# Patient Record
Sex: MALE | Race: WHITE | NOT HISPANIC OR LATINO | URBAN - METROPOLITAN AREA
[De-identification: names, ages, dates, MRNs, and addresses within clinical notes are randomized per-mention and may not be internally consistent; named-entity substitution may affect disease eponyms.]

---

## 2018-07-21 ENCOUNTER — INPATIENT (INPATIENT)
Facility: HOSPITAL | Age: 43
LOS: 5 days | Discharge: HOME | End: 2018-07-27
Attending: INTERNAL MEDICINE | Admitting: INTERNAL MEDICINE
Payer: COMMERCIAL

## 2018-07-21 VITALS
OXYGEN SATURATION: 97 % | TEMPERATURE: 98 F | SYSTOLIC BLOOD PRESSURE: 120 MMHG | RESPIRATION RATE: 18 BRPM | HEART RATE: 78 BPM | DIASTOLIC BLOOD PRESSURE: 87 MMHG

## 2018-07-21 DIAGNOSIS — Z95.2 PRESENCE OF PROSTHETIC HEART VALVE: Chronic | ICD-10-CM

## 2018-07-21 DIAGNOSIS — Z98.890 OTHER SPECIFIED POSTPROCEDURAL STATES: Chronic | ICD-10-CM

## 2018-07-21 LAB
ALBUMIN SERPL ELPH-MCNC: 3.9 G/DL — SIGNIFICANT CHANGE UP (ref 3.5–5.2)
ALP SERPL-CCNC: 70 U/L — SIGNIFICANT CHANGE UP (ref 30–115)
ALT FLD-CCNC: 55 U/L — HIGH (ref 0–41)
ANION GAP SERPL CALC-SCNC: 11 MMOL/L — SIGNIFICANT CHANGE UP (ref 7–14)
APTT BLD: 36.7 SEC — SIGNIFICANT CHANGE UP (ref 27–39.2)
AST SERPL-CCNC: 55 U/L — HIGH (ref 0–41)
BASE EXCESS BLDV CALC-SCNC: 1.5 MMOL/L — SIGNIFICANT CHANGE UP (ref -2–2)
BASOPHILS # BLD AUTO: 0.08 K/UL — SIGNIFICANT CHANGE UP (ref 0–0.2)
BASOPHILS NFR BLD AUTO: 0.7 % — SIGNIFICANT CHANGE UP (ref 0–1)
BILIRUB SERPL-MCNC: 1.7 MG/DL — HIGH (ref 0.2–1.2)
BUN SERPL-MCNC: 26 MG/DL — HIGH (ref 10–20)
CALCIUM SERPL-MCNC: 8.7 MG/DL — SIGNIFICANT CHANGE UP (ref 8.5–10.1)
CHLORIDE SERPL-SCNC: 104 MMOL/L — SIGNIFICANT CHANGE UP (ref 98–110)
CK MB CFR SERPL CALC: 7.1 NG/ML — HIGH (ref 0.6–6.3)
CK MB CFR SERPL CALC: 7.6 NG/ML — HIGH (ref 0.6–6.3)
CK SERPL-CCNC: 529 U/L — HIGH (ref 0–225)
CK SERPL-CCNC: 596 U/L — HIGH (ref 0–225)
CO2 SERPL-SCNC: 27 MMOL/L — SIGNIFICANT CHANGE UP (ref 17–32)
CREAT SERPL-MCNC: 1.6 MG/DL — HIGH (ref 0.7–1.5)
D DIMER BLD IA.RAPID-MCNC: 213 NG/ML DDU — SIGNIFICANT CHANGE UP (ref 0–230)
EOSINOPHIL # BLD AUTO: 0.24 K/UL — SIGNIFICANT CHANGE UP (ref 0–0.7)
EOSINOPHIL NFR BLD AUTO: 2.2 % — SIGNIFICANT CHANGE UP (ref 0–8)
GLUCOSE SERPL-MCNC: 100 MG/DL — HIGH (ref 70–99)
HCO3 BLDV-SCNC: 30 MMOL/L — HIGH (ref 22–29)
HCT VFR BLD CALC: 46.5 % — SIGNIFICANT CHANGE UP (ref 42–52)
HGB BLD-MCNC: 14.8 G/DL — SIGNIFICANT CHANGE UP (ref 14–18)
HOROWITZ INDEX BLDV+IHG-RTO: 21 — SIGNIFICANT CHANGE UP
IMM GRANULOCYTES NFR BLD AUTO: 0.4 % — HIGH (ref 0.1–0.3)
INR BLD: 3.25 RATIO — HIGH (ref 0.65–1.3)
LACTATE BLDV-MCNC: 1.6 MMOL/L — SIGNIFICANT CHANGE UP (ref 0.5–1.6)
LYMPHOCYTES # BLD AUTO: 2.46 K/UL — SIGNIFICANT CHANGE UP (ref 1.2–3.4)
LYMPHOCYTES # BLD AUTO: 22.2 % — SIGNIFICANT CHANGE UP (ref 20.5–51.1)
MCHC RBC-ENTMCNC: 29 PG — SIGNIFICANT CHANGE UP (ref 27–31)
MCHC RBC-ENTMCNC: 31.8 G/DL — LOW (ref 32–37)
MCV RBC AUTO: 91 FL — SIGNIFICANT CHANGE UP (ref 80–94)
MONOCYTES # BLD AUTO: 0.74 K/UL — HIGH (ref 0.1–0.6)
MONOCYTES NFR BLD AUTO: 6.7 % — SIGNIFICANT CHANGE UP (ref 1.7–9.3)
NEUTROPHILS # BLD AUTO: 7.51 K/UL — HIGH (ref 1.4–6.5)
NEUTROPHILS NFR BLD AUTO: 67.8 % — SIGNIFICANT CHANGE UP (ref 42.2–75.2)
NRBC # BLD: 0 /100 WBCS — SIGNIFICANT CHANGE UP (ref 0–0)
NT-PROBNP SERPL-SCNC: 4508 PG/ML — HIGH (ref 0–300)
PCO2 BLDV: 59 MMHG — HIGH (ref 41–51)
PH BLDV: 7.31 — SIGNIFICANT CHANGE UP (ref 7.26–7.43)
PLATELET # BLD AUTO: 224 K/UL — SIGNIFICANT CHANGE UP (ref 130–400)
PO2 BLDV: 20 MMHG — SIGNIFICANT CHANGE UP (ref 20–40)
POTASSIUM SERPL-MCNC: 4.4 MMOL/L — SIGNIFICANT CHANGE UP (ref 3.5–5)
POTASSIUM SERPL-SCNC: 4.4 MMOL/L — SIGNIFICANT CHANGE UP (ref 3.5–5)
PROT SERPL-MCNC: 6.4 G/DL — SIGNIFICANT CHANGE UP (ref 6–8)
PROTHROM AB SERPL-ACNC: 36 SEC — HIGH (ref 9.95–12.87)
RBC # BLD: 5.11 M/UL — SIGNIFICANT CHANGE UP (ref 4.7–6.1)
RBC # FLD: 14.8 % — HIGH (ref 11.5–14.5)
SAO2 % BLDV: 21 % — SIGNIFICANT CHANGE UP
SODIUM SERPL-SCNC: 142 MMOL/L — SIGNIFICANT CHANGE UP (ref 135–146)
TROPONIN T SERPL-MCNC: 0.04 NG/ML — CRITICAL HIGH
TROPONIN T SERPL-MCNC: 0.05 NG/ML — CRITICAL HIGH
WBC # BLD: 11.07 K/UL — HIGH (ref 4.8–10.8)
WBC # FLD AUTO: 11.07 K/UL — HIGH (ref 4.8–10.8)

## 2018-07-21 RX ORDER — INFLUENZA VIRUS VACCINE 15; 15; 15; 15 UG/.5ML; UG/.5ML; UG/.5ML; UG/.5ML
0.5 SUSPENSION INTRAMUSCULAR ONCE
Qty: 0 | Refills: 0 | Status: COMPLETED | OUTPATIENT
Start: 2018-07-21 | End: 2018-07-21

## 2018-07-21 RX ORDER — WARFARIN SODIUM 2.5 MG/1
2.5 TABLET ORAL ONCE
Qty: 0 | Refills: 0 | Status: COMPLETED | OUTPATIENT
Start: 2018-07-21 | End: 2018-07-21

## 2018-07-21 RX ORDER — ATORVASTATIN CALCIUM 80 MG/1
40 TABLET, FILM COATED ORAL AT BEDTIME
Qty: 0 | Refills: 0 | Status: DISCONTINUED | OUTPATIENT
Start: 2018-07-21 | End: 2018-07-27

## 2018-07-21 RX ORDER — ASPIRIN/CALCIUM CARB/MAGNESIUM 324 MG
81 TABLET ORAL DAILY
Qty: 0 | Refills: 0 | Status: DISCONTINUED | OUTPATIENT
Start: 2018-07-21 | End: 2018-07-27

## 2018-07-21 RX ORDER — METOPROLOL TARTRATE 50 MG
25 TABLET ORAL
Qty: 0 | Refills: 0 | Status: DISCONTINUED | OUTPATIENT
Start: 2018-07-21 | End: 2018-07-26

## 2018-07-21 RX ORDER — FUROSEMIDE 40 MG
20 TABLET ORAL ONCE
Qty: 0 | Refills: 0 | Status: COMPLETED | OUTPATIENT
Start: 2018-07-21 | End: 2018-07-21

## 2018-07-21 RX ORDER — FUROSEMIDE 40 MG
40 TABLET ORAL
Qty: 0 | Refills: 0 | Status: DISCONTINUED | OUTPATIENT
Start: 2018-07-22 | End: 2018-07-23

## 2018-07-21 RX ORDER — IPRATROPIUM/ALBUTEROL SULFATE 18-103MCG
3 AEROSOL WITH ADAPTER (GRAM) INHALATION ONCE
Qty: 0 | Refills: 0 | Status: COMPLETED | OUTPATIENT
Start: 2018-07-21 | End: 2018-07-21

## 2018-07-21 RX ADMIN — WARFARIN SODIUM 2.5 MILLIGRAM(S): 2.5 TABLET ORAL at 23:22

## 2018-07-21 RX ADMIN — Medication 3 MILLILITER(S): at 18:20

## 2018-07-21 RX ADMIN — Medication 25 MILLIGRAM(S): at 23:02

## 2018-07-21 RX ADMIN — Medication 20 MILLIGRAM(S): at 19:16

## 2018-07-21 RX ADMIN — ATORVASTATIN CALCIUM 40 MILLIGRAM(S): 80 TABLET, FILM COATED ORAL at 23:02

## 2018-07-21 RX ADMIN — Medication 20 MILLIGRAM(S): at 23:01

## 2018-07-21 NOTE — ED PROVIDER NOTE - PHYSICAL EXAMINATION
VITAL SIGNS: I have reviewed nursing notes and confirm.  CONSTITUTIONAL: Well-developed; well-nourished; in no acute distress.  SKIN: Skin exam is warm and dry, <2 sec cap refill  HEAD: Normocephalic; atraumatic.  EYES: PERRL, EOM intact;normal conjunctiva.  ENT: MMM; airway clear.   NECK: Supple; non tender.  CARD: RRR, 2+ dp pulses  RESP: No wheezes, rales or rhonchi, speaking in full sentences  ABD: soft non tender.   NEURO: Alert, oriented. Grossly unremarkable. No focal deficits.  PSYCH: Cooperative, appropriate. VITAL SIGNS: I have reviewed nursing notes and confirm.  CONSTITUTIONAL: Well-developed; well-nourished; in no acute distress.  SKIN: Skin exam is warm and dry, <2 sec cap refill  HEAD: Normocephalic; atraumatic.  EYES: PERRL, EOM intact;normal conjunctiva.  ENT: MMM; airway clear.   NECK: Supple; non tender.  CARD: tachycardic, 2+ dp pulses, no lower extremity edema  RESP: No wheezes, rales or rhonchi, speaking in full sentences  ABD: soft non tender.   NEURO: Alert, oriented. Grossly unremarkable. No focal deficits.  PSYCH: Cooperative, appropriate. VITAL SIGNS: I have reviewed nursing notes and confirm.  CONSTITUTIONAL: Well-developed; well-nourished; in no acute distress.  SKIN: Skin exam is warm and dry, <2 sec cap refill  HEAD: Normocephalic; atraumatic.  EYES: PERRL, EOM intact;normal conjunctiva.  ENT: MMM; airway clear.   NECK: Supple; non tender.  CARD: tachycardic, 2+ dp pulses, no lower extremity edema  RESP: bibasliar crackles, speaking in full sentences  ABD: soft non tender.   NEURO: Alert, oriented. Grossly unremarkable. No focal deficits.  PSYCH: Cooperative, appropriate.

## 2018-07-21 NOTE — ED PROVIDER NOTE - CARE PLAN
Principal Discharge DX:	Congestive heart failure Principal Discharge DX:	Congestive heart failure  Secondary Diagnosis:	Elevated troponin

## 2018-07-21 NOTE — ED PROVIDER NOTE - PROGRESS NOTE DETAILS
Pt signed out to Dr. Claros pending work up and admission. Work-up reviewed. Will admit to tele. Discussed with Dr. Stevens. I was directly involved in the care of this patient. PA Fellow Shiloh note/plan reviewed and agreed.

## 2018-07-21 NOTE — ED PROVIDER NOTE - MEDICAL DECISION MAKING DETAILS
I have personally performed a history and physical exam on this patient and personally directed the management of the patient.  Chart reviewed. Will admit to tele. I have personally performed a history and physical exam on this patient and personally directed the management of the patient.  Chart reviewed. Will admit to tele for CHF.

## 2018-07-21 NOTE — CONSULT NOTE ADULT - SUBJECTIVE AND OBJECTIVE BOX
Patient is a 42y old  Male who presents with a chief complaint of SOB    HPI: 42 years old M, with Pmhx Aortic/ Mitral valves replacement ( metallic) on coumadin due to endocarditis due to tooth infection, denies IVDA, presented to ED for 1 week hx of SOB, cough, orthopnea, symptoms worsened ED. last echo per him prior to surgery, followed by cardio in [place]. also reports intermittent retrosternal CP with no radiation, no fever, chills, mild leg swelling. in ED pro BNP was high, was given IV lasix and called to evaluate, reports symptoms of KARL but was never tested      PAST MEDICAL & SURGICAL HISTORY:  MVR/AVR/ METALLIC  HERNIA SURGERY    SOCIAL HX:   Smoking denies        REVIEW OF SYSTEMS see hpi      Allergies    penicillin (Unknown)    Intolerances        : Home Meds:      PHYSICAL EXAM    Vital Signs   T(C): 37.1 (21 Jul 2018 19:06), Max: 37.2 (21 Jul 2018 18:32)  T(F): 98.8 (21 Jul 2018 19:06), Max: 98.9 (21 Jul 2018 18:32)  HR: 109 (21 Jul 2018 19:34) (78 - 115)  BP: 120/83 (21 Jul 2018 19:06) (120/83 - 120/87)-  RR: 21 (21 Jul 2018 19:06) (18 - 21)  SpO2: 96% (21 Jul 2018 19:06) (96% - 97%) RA      General:  HEENT:  JUAN              Lymph Nodes: No cervical LN   Lungs: Bibasilar crackles  Cardiovascular: Regular  Abdomen: Soft, Positive BS  Extremities: swelling +  Skin: Warm  Neurological: Non focal         LABS:                          14.8   11.07 )-----------( 224      ( 21 Jul 2018 18:10 )             46.5                                               07-21    142  |  104  |  26<H>  ----------------------------<  100<H>  4.4   |  27  |  1.6<H>    Ca    8.7      21 Jul 2018 18:10    TPro  6.4  /  Alb  3.9  /  TBili  1.7<H>  /  DBili  x   /  AST  55<H>  /  ALT  55<H>  /  AlkPhos  70  07-21      PT/INR - ( 21 Jul 2018 18:10 )   PT: 36.00 sec;   INR: 3.25 ratio         PTT - ( 21 Jul 2018 18:10 )  PTT:36.7 sec                                           CARDIAC MARKERS ( 21 Jul 2018 18:10 )  x     / 0.04 ng/mL / 596 U/L / x     / 7.6 ng/mL                                            LIVER FUNCTIONS - ( 21 Jul 2018 18:10 )  Alb: 3.9 g/dL / Pro: 6.4 g/dL / ALK PHOS: 70 U/L / ALT: 55 U/L / AST: 55 U/L / GGT: x                                                                                                                                       X-Rays  reviewed/ EKG reviewed

## 2018-07-21 NOTE — ED PROVIDER NOTE - ATTENDING CONTRIBUTION TO CARE
42 y.o. male, PMH of open heart sx 1 year ago, MVR, on Coumadin, comes in c/o 1 wk h/o SOB and chest discomfort. No fever/chills, n/v/c/d, abdominal pain, leg pain/swelling. Reports mild cough. On exam, pt in NAD, AAOx3, head NC/AT, lungs CTA B/L, CV S1S2 regular, abdomen soft/NT/ND/(+)BS, ext (-) edema. Will do labs, CXR, neb and reevaluate.

## 2018-07-21 NOTE — ED PROVIDER NOTE - NS ED ROS FT
Review of Systems:  CONSTITUTIONAL - no fever  EYES - no photophobia, no blurred vision  ENT - no ear pain, no nasal discharge, no sore throat  RESPIRATORY -+shortness of breath,+dry cough,  CARDIAC - +CP  GI - no abd pain, no nausea, no vomiting, no diarrhea,   GENITO-URINARY - no dysuria; no hematuria,   MUSCULOSKELETAL - no joint paint  NEUROLOGIC - no headache,   PSYCH - no anxiety, non suicidal, non homicidal, no hallucination, no depression

## 2018-07-21 NOTE — ED PROVIDER NOTE - OBJECTIVE STATEMENT
43 y/o M, h/o AV/MV replacement (1 year ago), 43 y/o M, h/o AV/MV replacement s/p endocarditits (1 year ago at [facility] in [place]), presents with SOB worsening for the past month and sharp midsternal CP that started today. pt states he has been feeling increasingly SOB and RATLIFF the past 2 weeks and has developed a dry cough. pt also reports difficulty lying flat to sleep. No recent travel. no personal/family history of blood clots/cardiac problems. no prior history of similar symptoms in the past. Denies fever, chills, abd pain, flank pain, n/v, leg swelling. 43 y/o M, h/o AV/MV replacement-on coumadin s/p endocarditits (1 year ago at [facility] in [place]), presents with SOB worsening for the past month and sharp midsternal CP that started today. pt states he has been feeling increasingly SOB and RATLIFF the past 2 weeks and has developed a dry cough. pt also reports difficulty lying flat to sleep. No recent travel. no personal/family history of blood clots/cardiac problems. no prior history of similar symptoms in the past. Denies fever, chills, abd pain, flank pain, n/v, leg swelling. 41 y/o M, h/o AV/MV replacement-on coumadin (1 year ago at [facility] in [place]), presents with SOB worsening for the past month and sharp midsternal CP that started today. pt states he has been feeling increasingly SOB and RATLIFF the past 2 weeks and has developed a dry cough. pt also reports difficulty lying flat to sleep. No recent travel. no personal/family history of blood clots/cardiac problems. no prior history of similar symptoms in the past. Denies fever, chills, wheezing, palpitations, diaphoresis, abd pain, flank pain, n/v, leg swelling. 43 y/o M, h/o AV/MV replacement-on coumadin (1 year ago at [facility] in [place] from endocarditis resulting from dental procedure), presents with SOB worsening for the past month and sharp midsternal CP that started today. pt states he has been feeling increasingly SOB and RATLIFF the past 2 weeks and has developed a dry cough. pt also reports difficulty lying flat to sleep. No recent travel. no personal/family history of blood clots/cardiac problems. no prior history of similar symptoms in the past. Denies fever, chills, wheezing, palpitations, diaphoresis, abd pain, flank pain, n/v, leg swelling.

## 2018-07-21 NOTE — CONSULT NOTE ADULT - ASSESSMENT
SOB/ ORTHOPNEA/ INCREASE MARKINGS ON CXR/ FAVOR CONGESTIVE HEART FAILURE/ VALVULAR DISEASE/ CP    - ADMIT TO TELE  - LASIX 40 MG IV 12 H   - ASA/ LIPITOR/ KEEP METOPROLOL (VALVE RELATED)  - CE X2 Q 8H  - 2 D ECHO  - CARDIO EVAL  - COUMADIN F/UP INR  - REPEAT CXR IN AM  - NEEDS NPSG OP/ CAN DO TRENDING POX  - DVT/ GI PROPHYLAXIS

## 2018-07-22 LAB
ANION GAP SERPL CALC-SCNC: 12 MMOL/L — SIGNIFICANT CHANGE UP (ref 7–14)
ANION GAP SERPL CALC-SCNC: 13 MMOL/L — SIGNIFICANT CHANGE UP (ref 7–14)
BUN SERPL-MCNC: 25 MG/DL — HIGH (ref 10–20)
BUN SERPL-MCNC: 26 MG/DL — HIGH (ref 10–20)
CALCIUM SERPL-MCNC: 8.8 MG/DL — SIGNIFICANT CHANGE UP (ref 8.5–10.1)
CALCIUM SERPL-MCNC: 8.8 MG/DL — SIGNIFICANT CHANGE UP (ref 8.5–10.1)
CHLORIDE SERPL-SCNC: 100 MMOL/L — SIGNIFICANT CHANGE UP (ref 98–110)
CHLORIDE SERPL-SCNC: 101 MMOL/L — SIGNIFICANT CHANGE UP (ref 98–110)
CK MB CFR SERPL CALC: 6 NG/ML — SIGNIFICANT CHANGE UP (ref 0.6–6.3)
CK SERPL-CCNC: 369 U/L — HIGH (ref 0–225)
CO2 SERPL-SCNC: 29 MMOL/L — SIGNIFICANT CHANGE UP (ref 17–32)
CO2 SERPL-SCNC: 30 MMOL/L — SIGNIFICANT CHANGE UP (ref 17–32)
CREAT SERPL-MCNC: 1.4 MG/DL — SIGNIFICANT CHANGE UP (ref 0.7–1.5)
CREAT SERPL-MCNC: 1.5 MG/DL — SIGNIFICANT CHANGE UP (ref 0.7–1.5)
GLUCOSE SERPL-MCNC: 142 MG/DL — HIGH (ref 70–99)
GLUCOSE SERPL-MCNC: 98 MG/DL — SIGNIFICANT CHANGE UP (ref 70–99)
HCT VFR BLD CALC: 47.3 % — SIGNIFICANT CHANGE UP (ref 42–52)
HGB BLD-MCNC: 14.9 G/DL — SIGNIFICANT CHANGE UP (ref 14–18)
INR BLD: 3.32 RATIO — HIGH (ref 0.65–1.3)
MCHC RBC-ENTMCNC: 28.5 PG — SIGNIFICANT CHANGE UP (ref 27–31)
MCHC RBC-ENTMCNC: 31.5 G/DL — LOW (ref 32–37)
MCV RBC AUTO: 90.6 FL — SIGNIFICANT CHANGE UP (ref 80–94)
NRBC # BLD: 0 /100 WBCS — SIGNIFICANT CHANGE UP (ref 0–0)
PLATELET # BLD AUTO: 199 K/UL — SIGNIFICANT CHANGE UP (ref 130–400)
POTASSIUM SERPL-MCNC: 4 MMOL/L — SIGNIFICANT CHANGE UP (ref 3.5–5)
POTASSIUM SERPL-MCNC: 5.2 MMOL/L — HIGH (ref 3.5–5)
POTASSIUM SERPL-SCNC: 4 MMOL/L — SIGNIFICANT CHANGE UP (ref 3.5–5)
POTASSIUM SERPL-SCNC: 5.2 MMOL/L — HIGH (ref 3.5–5)
PROTHROM AB SERPL-ACNC: 36.7 SEC — HIGH (ref 9.95–12.87)
RBC # BLD: 5.22 M/UL — SIGNIFICANT CHANGE UP (ref 4.7–6.1)
RBC # FLD: 14.9 % — HIGH (ref 11.5–14.5)
SODIUM SERPL-SCNC: 142 MMOL/L — SIGNIFICANT CHANGE UP (ref 135–146)
SODIUM SERPL-SCNC: 143 MMOL/L — SIGNIFICANT CHANGE UP (ref 135–146)
TROPONIN T SERPL-MCNC: 0.04 NG/ML — CRITICAL HIGH
WBC # BLD: 10.18 K/UL — SIGNIFICANT CHANGE UP (ref 4.8–10.8)
WBC # FLD AUTO: 10.18 K/UL — SIGNIFICANT CHANGE UP (ref 4.8–10.8)

## 2018-07-22 PROCEDURE — 93970 EXTREMITY STUDY: CPT | Mod: 26

## 2018-07-22 RX ORDER — WARFARIN SODIUM 2.5 MG/1
5 TABLET ORAL DAILY
Qty: 0 | Refills: 0 | Status: DISCONTINUED | OUTPATIENT
Start: 2018-07-22 | End: 2018-07-22

## 2018-07-22 RX ORDER — WARFARIN SODIUM 2.5 MG/1
2.5 TABLET ORAL ONCE
Qty: 0 | Refills: 0 | Status: COMPLETED | OUTPATIENT
Start: 2018-07-22 | End: 2018-07-22

## 2018-07-22 RX ORDER — METOPROLOL TARTRATE 50 MG
25 TABLET ORAL DAILY
Qty: 0 | Refills: 0 | Status: DISCONTINUED | OUTPATIENT
Start: 2018-07-22 | End: 2018-07-22

## 2018-07-22 RX ADMIN — Medication 81 MILLIGRAM(S): at 12:48

## 2018-07-22 RX ADMIN — Medication 40 MILLIGRAM(S): at 18:30

## 2018-07-22 RX ADMIN — Medication 40 MILLIGRAM(S): at 05:35

## 2018-07-22 RX ADMIN — ATORVASTATIN CALCIUM 40 MILLIGRAM(S): 80 TABLET, FILM COATED ORAL at 21:19

## 2018-07-22 RX ADMIN — Medication 25 MILLIGRAM(S): at 10:26

## 2018-07-22 RX ADMIN — Medication 25 MILLIGRAM(S): at 21:20

## 2018-07-22 RX ADMIN — WARFARIN SODIUM 2.5 MILLIGRAM(S): 2.5 TABLET ORAL at 21:19

## 2018-07-22 NOTE — H&P ADULT - ASSESSMENT
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     RATLIFF/orthopnea/chest tightness 2/2 CHF exacerbation  -Admit to unit  -c/w Lasix 40 q12h  -cxr still shows CHF  -BNP is 4508  -CE 0.04-->0.05-->0.04  -2 d echo ordered   -Cardiology following  -c/w metoprolol  -check lipids, tsh and HbA1c  -f/u venous duplex    Mechanical mitral and aortic valve  -c/w coumadin  -keep INR between 2.5-3.5    CKD baseline unavailable  -monitor bmp as could be 2/2 edema    DVT ppx  -c/w coumadin    GI ppx  -start protonix

## 2018-07-22 NOTE — CONSULT NOTE ADULT - ASSESSMENT
Patient now not sob. Lying flat in bed not on O2. No overt CHF now. Will begin beta to slow HR. He needs a echo . Check repeat cxr

## 2018-07-22 NOTE — H&P ADULT - NSHPSOCIALHISTORY_GEN_ALL_CORE
Marital Status:  (   )    (   ) Single    (x )    (  )   Occupation:   Lives with: ( x ) alone  (  ) children   (  ) spouse   (  ) parents  (  ) other    Substance Use (street drugs): ( x ) never used  (  ) other:  Tobacco Usage:  (   ) never smoked   (Quit 1 yr ago and used to smoke 1ppd for 23yrs) former smoker   (   ) current smoker  (     ) pack year  (        ) last cigarette date  Alcohol Usage: occasional

## 2018-07-22 NOTE — H&P ADULT - HISTORY OF PRESENT ILLNESS
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 As per patient the symptoms continued to worsen which is why he came to the ED. He was unable to lie flat and had mild lower extremity edema. He denies palpitations, fevers or chills. In the ED patient was found to have elevated bnp and was given lasix after his cxr showed CHF.

## 2018-07-22 NOTE — H&P ADULT - PMH
Aortic valve replaced  metallic valve  Infective endocarditis, due to unspecified organism, unspecified chronicity    Mitral valve replaced  metallic valve

## 2018-07-22 NOTE — CONSULT NOTE ADULT - SUBJECTIVE AND OBJECTIVE BOX
CARDIOLOGY CONSULT NOTE     CHIEF COMPLAINT/REASON FOR CONSULT:    HPI:      42 years old M, with Pmhx Aortic/ Mitral valves replacement ( metallic) on coumadin due to endocarditis due to tooth infection, denies IVDA, presented to ED for 1 week hx of SOB, cough, symptoms worsened ED. Last echo per him prior to surgery, followed by cardio in [place]. Also reports intermittent retrosternal CP with no radiation, no fever, chills, mild leg swelling. in ED pro BNP was high, was given IV lasix .  Reports symptoms of KARL but was never tested          PAST MEDICAL & SURGICAL HISTORY:  H/O hand surgery  H/O hernia repair  H/O mitral valve replacement  Aortic valve replaced      Cardiac Risks:   [ ]HTN, [ ] DM, [ ] Smoking, [ ] FH,  [ ] Lipids        MEDICATIONS:  MEDICATIONS  (STANDING):  aspirin enteric coated 81 milliGRAM(s) Oral daily  atorvastatin 40 milliGRAM(s) Oral at bedtime  furosemide   Injectable 40 milliGRAM(s) IV Push two times a day  metoprolol tartrate 25 milliGRAM(s) Oral two times a day      FAMILY HISTORY:      SOCIAL HISTORY:      [ ] Marital status Seperated  Allergies    penicillin (Unknown)        	    REVIEW OF SYSTEMS:  CONSTITUTIONAL: No fever, weight loss, or fatigue  EYES: No eye pain, visual disturbances, or discharge  ENMT:  No difficulty hearing, tinnitus, vertigo; No sinus or throat pain  NECK: No pain or stiffness  RESPIRATORY: No cough, wheezing, chills or hemoptysis; No Shortness of Breath  CARDIOVASCULAR: See above  GASTROINTESTINAL: No abdominal or epigastric pain. No nausea, vomiting, or hematemesis; No diarrhea or constipation. No melena or hematochezia.  GENITOURINARY: No dysuria, frequency, hematuria, or incontinence  NEUROLOGICAL: No headaches, memory loss, loss of strength, numbness, or tremors  SKIN: No itching, burning, rashes, or lesions   	      PHYSICAL EXAM:  T(C): 36.3 (07-21-18 @ 23:01), Max: 37.2 (07-21-18 @ 18:32)  HR: 114 (07-21-18 @ 23:20) (78 - 116)  BP: 131/77 (07-21-18 @ 23:20) (120/83 - 135/88)  RR: 42 (07-21-18 @ 23:20) (18 - 42)  SpO2: 98% (07-21-18 @ 22:05) (96% - 98%)  Wt(kg): --  I&O's Summary    21 Jul 2018 07:01  -  22 Jul 2018 07:00  --------------------------------------------------------  IN: 1500 mL / OUT: 3270 mL / NET: -1770 mL        Appearance: Normal	  Psychiatry: A & O x 3, Mood & affect appropriate  HEENT:   Normal oral mucosa, PERRL, EOMI	  Lymphatic: No lymphadenopathy  Cardiovascular: Normal S1 S2,RRR, No JVD, No murmurs  Respiratory: Lungs clear to auscultation	  Gastrointestinal:  Soft, Non-tender, + BS	  Skin: No rashes, No ecchymoses, No cyanosis	  Neurologic: Non-focal  Extremities: Normal range of motion, No clubbing, cyanosis or edema  Vascular: Peripheral pulses palpable 2+ bilaterally      ECG:  	sinus tach    	  LABS:	 	    CARDIAC MARKERS:          Serum Pro-Brain Natriuretic Peptide: 4508 pg/mL (07-21 @ 18:10)                            14.8   11.07 )-----------( 224      ( 21 Jul 2018 18:10 )             46.5     07-21    142  |  104  |  26<H>  ----------------------------<  100<H>  4.4   |  27  |  1.6<H>    Ca    8.7      21 Jul 2018 18:10    TPro  6.4  /  Alb  3.9  /  TBili  1.7<H>  /  DBili  x   /  AST  55<H>  /  ALT  55<H>  /  AlkPhos  70  07-21    PT/INR - ( 21 Jul 2018 18:10 )   PT: 36.00 sec;   INR: 3.25 ratio         PTT - ( 21 Jul 2018 18:10 )  PTT:36.7 sec  proBNP: Serum Pro-Brain Natriuretic Peptide: 4508 pg/mL (07-21 @ 18:10)

## 2018-07-23 LAB
ALBUMIN SERPL ELPH-MCNC: 3.6 G/DL — SIGNIFICANT CHANGE UP (ref 3.5–5.2)
ALP SERPL-CCNC: 59 U/L — SIGNIFICANT CHANGE UP (ref 30–115)
ALT FLD-CCNC: 45 U/L — HIGH (ref 0–41)
ANION GAP SERPL CALC-SCNC: 13 MMOL/L — SIGNIFICANT CHANGE UP (ref 7–14)
AST SERPL-CCNC: 34 U/L — SIGNIFICANT CHANGE UP (ref 0–41)
BASOPHILS # BLD AUTO: 0.08 K/UL — SIGNIFICANT CHANGE UP (ref 0–0.2)
BASOPHILS NFR BLD AUTO: 0.9 % — SIGNIFICANT CHANGE UP (ref 0–1)
BILIRUB SERPL-MCNC: 1.9 MG/DL — HIGH (ref 0.2–1.2)
BUN SERPL-MCNC: 26 MG/DL — HIGH (ref 10–20)
CALCIUM SERPL-MCNC: 8.5 MG/DL — SIGNIFICANT CHANGE UP (ref 8.5–10.1)
CHLORIDE SERPL-SCNC: 100 MMOL/L — SIGNIFICANT CHANGE UP (ref 98–110)
CHOLEST SERPL-MCNC: 133 MG/DL — SIGNIFICANT CHANGE UP (ref 100–200)
CK SERPL-CCNC: 204 U/L — SIGNIFICANT CHANGE UP (ref 0–225)
CO2 SERPL-SCNC: 31 MMOL/L — SIGNIFICANT CHANGE UP (ref 17–32)
CREAT SERPL-MCNC: 1.4 MG/DL — SIGNIFICANT CHANGE UP (ref 0.7–1.5)
EOSINOPHIL # BLD AUTO: 0.32 K/UL — SIGNIFICANT CHANGE UP (ref 0–0.7)
EOSINOPHIL NFR BLD AUTO: 3.4 % — SIGNIFICANT CHANGE UP (ref 0–8)
ESTIMATED AVERAGE GLUCOSE: 111 MG/DL — SIGNIFICANT CHANGE UP (ref 68–114)
GLUCOSE SERPL-MCNC: 101 MG/DL — HIGH (ref 70–99)
HBA1C BLD-MCNC: 5.5 % — SIGNIFICANT CHANGE UP (ref 4–5.6)
HCT VFR BLD CALC: 47.3 % — SIGNIFICANT CHANGE UP (ref 42–52)
HDLC SERPL-MCNC: 23 MG/DL — LOW (ref 40–125)
HGB BLD-MCNC: 15.3 G/DL — SIGNIFICANT CHANGE UP (ref 14–18)
IMM GRANULOCYTES NFR BLD AUTO: 0.4 % — HIGH (ref 0.1–0.3)
INR BLD: 3.92 RATIO — HIGH (ref 0.65–1.3)
LIPID PNL WITH DIRECT LDL SERPL: 96 MG/DL — SIGNIFICANT CHANGE UP (ref 4–129)
LYMPHOCYTES # BLD AUTO: 2.38 K/UL — SIGNIFICANT CHANGE UP (ref 1.2–3.4)
LYMPHOCYTES # BLD AUTO: 25.6 % — SIGNIFICANT CHANGE UP (ref 20.5–51.1)
MAGNESIUM SERPL-MCNC: 2 MG/DL — SIGNIFICANT CHANGE UP (ref 1.8–2.4)
MCHC RBC-ENTMCNC: 29.1 PG — SIGNIFICANT CHANGE UP (ref 27–31)
MCHC RBC-ENTMCNC: 32.3 G/DL — SIGNIFICANT CHANGE UP (ref 32–37)
MCV RBC AUTO: 89.9 FL — SIGNIFICANT CHANGE UP (ref 80–94)
MONOCYTES # BLD AUTO: 0.6 K/UL — SIGNIFICANT CHANGE UP (ref 0.1–0.6)
MONOCYTES NFR BLD AUTO: 6.5 % — SIGNIFICANT CHANGE UP (ref 1.7–9.3)
NEUTROPHILS # BLD AUTO: 5.86 K/UL — SIGNIFICANT CHANGE UP (ref 1.4–6.5)
NEUTROPHILS NFR BLD AUTO: 63.2 % — SIGNIFICANT CHANGE UP (ref 42.2–75.2)
PLATELET # BLD AUTO: 198 K/UL — SIGNIFICANT CHANGE UP (ref 130–400)
POTASSIUM SERPL-MCNC: 4.1 MMOL/L — SIGNIFICANT CHANGE UP (ref 3.5–5)
POTASSIUM SERPL-SCNC: 4.1 MMOL/L — SIGNIFICANT CHANGE UP (ref 3.5–5)
PROT SERPL-MCNC: 6.2 G/DL — SIGNIFICANT CHANGE UP (ref 6–8)
PROTHROM AB SERPL-ACNC: >40 SEC — HIGH (ref 9.95–12.87)
RBC # BLD: 5.26 M/UL — SIGNIFICANT CHANGE UP (ref 4.7–6.1)
RBC # FLD: 14.8 % — HIGH (ref 11.5–14.5)
SODIUM SERPL-SCNC: 144 MMOL/L — SIGNIFICANT CHANGE UP (ref 135–146)
T3 SERPL-MCNC: 109 NG/DL — SIGNIFICANT CHANGE UP (ref 80–200)
T4 AB SER-ACNC: 7.4 UG/DL — SIGNIFICANT CHANGE UP (ref 4.6–12)
TOTAL CHOLESTEROL/HDL RATIO MEASUREMENT: 5.8 RATIO — HIGH (ref 4–5.5)
TRIGL SERPL-MCNC: 132 MG/DL — SIGNIFICANT CHANGE UP (ref 10–149)
TROPONIN T SERPL-MCNC: 0.04 NG/ML — CRITICAL HIGH
TSH SERPL-MCNC: 1.71 UIU/ML — SIGNIFICANT CHANGE UP (ref 0.27–4.2)
WBC # BLD: 9.28 K/UL — SIGNIFICANT CHANGE UP (ref 4.8–10.8)
WBC # FLD AUTO: 9.28 K/UL — SIGNIFICANT CHANGE UP (ref 4.8–10.8)

## 2018-07-23 RX ORDER — FUROSEMIDE 40 MG
40 TABLET ORAL
Qty: 0 | Refills: 0 | Status: DISCONTINUED | OUTPATIENT
Start: 2018-07-23 | End: 2018-07-27

## 2018-07-23 RX ORDER — WARFARIN SODIUM 2.5 MG/1
3 TABLET ORAL AT BEDTIME
Qty: 0 | Refills: 0 | Status: DISCONTINUED | OUTPATIENT
Start: 2018-07-23 | End: 2018-07-23

## 2018-07-23 RX ADMIN — Medication 40 MILLIGRAM(S): at 17:17

## 2018-07-23 RX ADMIN — Medication 25 MILLIGRAM(S): at 10:03

## 2018-07-23 RX ADMIN — Medication 25 MILLIGRAM(S): at 21:05

## 2018-07-23 RX ADMIN — Medication 81 MILLIGRAM(S): at 11:39

## 2018-07-23 RX ADMIN — ATORVASTATIN CALCIUM 40 MILLIGRAM(S): 80 TABLET, FILM COATED ORAL at 21:05

## 2018-07-23 RX ADMIN — Medication 40 MILLIGRAM(S): at 05:50

## 2018-07-23 NOTE — PROGRESS NOTE ADULT - ASSESSMENT
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     RATLIFF/orthopnea/chest tightness 2/2 CHF exacerbation  -Admit to unit  -c/w Lasix 40 q12h  -cxr   -BNP is 4508  -CE 0.04-->0.05-->0.04  -2 d echo ordered   -Cardiology following  -c/w metoprolol  -check lipids, tsh and HbA1c  -venous duplex shows no DVT    Mechanical mitral and aortic valve  -c/w coumadin  -keep INR between 2.5-3.5    CKD baseline unavailable  -monitor bmp as could be 2/2 edema    DVT ppx  -c/w coumadin    GI ppx  -start protonix 42Y M with pmh of infective endocarditis which developed after tooth infection s/p mechanical mitral and aortic valve replacement about a year ago presented with dyspnea on exertion and chest tightness for 1 week.     RATLIFF/orthopnea/chest tightness 2/2 CHF exacerbation  -Admit to unit  -Lasix 40 q12h will switch to PO today  -cxr   -BNP is 4508  -CE 0.04-->0.05-->0.04  -2 d echo done   -Cardiology following  -c/w metoprolol. Will increase if patient is tachycardic on ambulation.   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     RATLIFF/orthopnea/chest tightness 2/2 CHF exacerbation  -Admit to unit  -Lasix 40 q12h will switch to PO today  -cxr   -BNP is 4508  -CE 0.04-->0.05-->0.04  -2 d echo done   -Cardiology following  -c/w metoprolol. Will increase if patient is tachycardic on ambulation.   - lipids wnl, f/u tsh and HbA1c  -venous duplex shows no DVT    Mechanical mitral and aortic valve  -hold coumadin tonight since INR is supratheraputic   -keep INR between 2.5-3.5    CKD baseline unavailable  -monitor bmp as could be 2/2 edema  -Creatinine is stable at 1.4 from 1.6 on admission     DVT ppx  -on coumadin with theraputic INR    GI ppx  -c/w protonix

## 2018-07-23 NOTE — CONSULT NOTE ADULT - ATTENDING COMMENTS
Attending Statement: I have personally performed a face to face diagnostic evaluation on this patient. I have reviewed the above note and agree with the history, exam and plan of care, except as I have noted in the text.

## 2018-07-23 NOTE — PROGRESS NOTE ADULT - ASSESSMENT
Patient feels better . Able lay flat. To get echo. Daily weight. If HR still high. Increase beta. Ambulate. Watch lytes.

## 2018-07-23 NOTE — PROGRESS NOTE ADULT - SUBJECTIVE AND OBJECTIVE BOX
Patient is a 42y old  Male who now can lay flat      T(F): 97 (07-23-18 @ 07:12), Max: 97.9 (07-22-18 @ 22:08)  HR: 96 (07-23-18 @ 05:50)  BP: 125/81 (07-23-18 @ 05:50)  RR: 16 (07-23-18 @ 07:12)  SpO2: --    PHYSICAL EXAM:  GENERAL: NAD, well-groomed, well-developed  HEAD:  Atraumatic, Normocephalic  EYES: EOMI, PERRLA, conjunctiva and sclera clear  ENMT: No tonsillar erythema, exudates, or enlargement; Moist mucous membranes, Good dentition, No lesions  NECK: Supple, No JVD, Normal thyroid  NERVOUS SYSTEM:  Alert & Oriented X3,  Motor Strength 5/5 B/L upper and lower extremities  CHEST/LUNG: Clear to percussion bilaterally; No rales, rhonchi, wheezing, or rubs  HEART: Regular rate and rhythm; No murmurs, rubs, or gallops  ABDOMEN: Soft, Nontender, Nondistended; Bowel sounds present  EXTREMITIES:   No clubbing, cyanosis, or edema  LYMPH: No lymphadenopathy noted  SKIN: No rashes or lesions    labs  07-22    142  |  100  |  25<H>  ----------------------------<  142<H>  4.0   |  30  |  1.5    Ca    8.8      22 Jul 2018 12:05    TPro  6.4  /  Alb  3.9  /  TBili  1.7<H>  /  DBili  x   /  AST  55<H>  /  ALT  55<H>  /  AlkPhos  70  07-21                          15.3   9.28  )-----------( 198      ( 23 Jul 2018 05:19 )             47.3       PT/INR - ( 22 Jul 2018 05:35 )   PT: 36.70 sec;   INR: 3.32 ratio         PTT - ( 21 Jul 2018 18:10 )  PTT:36.7 sec    Troponin T, Serum: 0.04 ng/mL <HH> (07-23-18 @ 05:19)      aspirin enteric coated 81 milliGRAM(s) Oral daily  atorvastatin 40 milliGRAM(s) Oral at bedtime  furosemide   Injectable 40 milliGRAM(s) IV Push two times a day  metoprolol tartrate 25 milliGRAM(s) Oral two times a day

## 2018-07-23 NOTE — CONSULT NOTE ADULT - ASSESSMENT
Impression:  SOB likely secondary to pulmonary edema  HO valvular heart disease  Exsmoker    Recommendations:  - LASIX 40 MG IV 12 H  - Keep I<O   - 2 D ECHO FU  - CARDIO FU  - COUMADIN F/UP INR and adjust to target 2.5-3.5  - Ambulate and evaluate So2 on RA  - NEEDS NPSG OP  - DVT/ GI PROPHYLAXIS  - Tele as per Cardio Impression:  SOB likely secondary to pulmonary edema  HO valvular heart disease  Exsmoker  CKD    Recommendations:  - Keep I<O   - 2 D ECHO FU  - CARDIO FU  - COUMADIN F/UP INR and adjust to target 2.5-3.5  - Ambulate and evaluate So2 on RA  - NEEDS NPSG OP  - DVT/ GI PROPHYLAXIS  - Tele as per Cardio Impression:  SOB likely secondary to pulmonary edema  HO valvular heart disease  Exsmoker  CKD    Recommendations:  - Keep I<O   - 2 D ECHO FU  - CARDIO FU  - COUMADIN F/U INR and adjust to target 2.5-3.5  - Ambulate and evaluate So2 on RA  - NEEDS NPSG OP  - DVT/ GI PROPHYLAXIS  - Tele as per Cardio

## 2018-07-23 NOTE — PROGRESS NOTE ADULT - SUBJECTIVE AND OBJECTIVE BOX
SUBJECTIVE:    Patient is a 42y old Male who presents with a chief complaint of Dyspnea on exertion x1 week (22 Jul 2018 10:19)    Currently admitted to medicine with the primary diagnosis of Congestive heart failure     Today is hospital day 2d. This morning he is resting comfortably in bed and reports no new issues or overnight events.     PAST MEDICAL & SURGICAL HISTORY  Mitral valve replaced: metallic valve  Aortic valve replaced: metallic valve  Infective endocarditis, due to unspecified organism, unspecified chronicity  H/O hand surgery  H/O hernia repair  H/O mitral valve replacement  Aortic valve replaced    SOCIAL HISTORY:  Exsmoker. Quit 1 yr ago.      ALLERGIES:  penicillin (Unknown)    MEDICATIONS:  STANDING MEDICATIONS  aspirin enteric coated 81 milliGRAM(s) Oral daily  atorvastatin 40 milliGRAM(s) Oral at bedtime  furosemide   Injectable 40 milliGRAM(s) IV Push two times a day  metoprolol tartrate 25 milliGRAM(s) Oral two times a day    PRN MEDICATIONS    VITALS:   T(F): 97  HR: 96  BP: 125/81  RR: 16  SpO2: --    LABS:                        15.3   9.28  )-----------( 198      ( 23 Jul 2018 05:19 )             47.3     07-22    142  |  100  |  25<H>  ----------------------------<  142<H>  4.0   |  30  |  1.5    Ca    8.8      22 Jul 2018 12:05    TPro  6.4  /  Alb  3.9  /  TBili  1.7<H>  /  DBili  x   /  AST  55<H>  /  ALT  55<H>  /  AlkPhos  70  07-21    PT/INR - ( 22 Jul 2018 05:35 )   PT: 36.70 sec;   INR: 3.32 ratio         PTT - ( 21 Jul 2018 18:10 )  PTT:36.7 sec          CARDIAC MARKERS ( 22 Jul 2018 05:35 )  x     / 0.04 ng/mL / 369 U/L / x     / 6.0 ng/mL  CARDIAC MARKERS ( 21 Jul 2018 22:06 )  x     / 0.05 ng/mL / 529 U/L / x     / 7.1 ng/mL  CARDIAC MARKERS ( 21 Jul 2018 18:10 )  x     / 0.04 ng/mL / 596 U/L / x     / 7.6 ng/mL      RADIOLOGY:  < from: Xray Chest 1 View- PORTABLE-Routine (07.22.18 @ 04:53) >  Impression:    Findings compatible with CHF, unchanged.    < end of copied text >        07-22-18 @ 07:01  -  07-23-18 @ 07:00  --------------------------------------------------------  IN: 5498 mL / OUT: 4275 mL / NET: 1223 mL    07-23-18 @ 07:01  -  07-23-18 @ 07:31  --------------------------------------------------------  IN: 0 mL / OUT: 1100 mL / NET: -1100 mL        PHYSICAL EXAM:  GEN: No acute distress  LUNGS: Clear to auscultation bilaterally   HEART: S1/S2 present. RRR.   ABD: Soft, non-tender, non-distended. Bowel sounds present  EXT: NC/NC/NE/2+PP/OSUNA/Skin Intact.   NEURO: AAOX3

## 2018-07-23 NOTE — CONSULT NOTE ADULT - SUBJECTIVE AND OBJECTIVE BOX
Patient is a 42y old  Male who presents with a chief complaint of Dyspnea on exertion x1 week (22 Jul 2018 10:19)      HPI:  42Y M with pmh of infective endocarditis which developed after tooth infection s/p mechanical mitral and aortic valve replacement about a year ago presented with dyspnea on exertion and chest tightness for 1 week. As per patient the symptoms continued to worsen which is why he came to the ED. He was unable to lie flat and had mild lower extremity edema. He denies palpitations, fevers or chills. In the ED patient was found to have elevated bnp and was given lasix after his cxr showed CHF. (22 Jul 2018 10:19)      PAST MEDICAL & SURGICAL HISTORY:  Mitral valve replaced: metallic valve  Aortic valve replaced: metallic valve  Infective endocarditis, due to unspecified organism, unspecified chronicity  H/O hand surgery  H/O hernia repair  H/O mitral valve replacement  Aortic valve replaced      SOCIAL HX:   Smoking    23 PY stopped 1 year ago       FAMILY HISTORY:  Family history of colon cancer (Grandparent)      Review of system:  See HPI    Allergies    penicillin (Unknown)    PHYSICAL EXAM    ICU Vital Signs Last 24 Hrs  T(C): 36.1 (23 Jul 2018 07:12), Max: 36.6 (22 Jul 2018 22:08)  T(F): 97 (23 Jul 2018 07:12), Max: 97.9 (22 Jul 2018 22:08)  HR: 96 (23 Jul 2018 07:11) (93 - 116)  BP: 104/80 (23 Jul 2018 07:11) (104/80 - 146/79)  BP(mean): 88 (23 Jul 2018 07:11) (88 - 103)  RR: 16 (23 Jul 2018 07:12) (16 - 22)  SpO2: --    I&O's Detail    22 Jul 2018 07:01  -  23 Jul 2018 07:00  --------------------------------------------------------  IN:    Oral Fluid: 5498 mL  Total IN: 5498 mL    OUT:    Voided: 4275 mL  Total OUT: 4275 mL    Total NET: 1223 mL      23 Jul 2018 07:01  -  23 Jul 2018 07:54  --------------------------------------------------------  IN:  Total IN: 0 mL    OUT:    Voided: 1100 mL  Total OUT: 1100 mL    Total NET: -1100 mL      General: Comfortable in bed  HEENT:  On NC          Lungs: Basilar crackles  Cardiovascular: RRR, S1S2  Abdomen: BS+ve; soft non tender  Extremities: No LE edema  Neurological:  AAOx3; No focal deficit      LABS:                          15.3   9.28  )-----------( 198      ( 23 Jul 2018 05:19 )             47.3   07-23    144  |  100  |  26<H>  ----------------------------<  101<H>  4.1   |  31  |  1.4    Ca    8.5      23 Jul 2018 05:19  Mg     2.0     07-23    TPro  6.2  /  Alb  3.6  /  TBili  1.9<H>  /  DBili  x   /  AST  34  /  ALT  45<H>  /  AlkPhos  59  07-23      PT/INR - ( 22 Jul 2018 05:35 )   PT: 36.70 sec;   INR: 3.32 ratio         PTT - ( 21 Jul 2018 18:10 )  PTT:36.7 sec                                           CARDIAC MARKERS ( 23 Jul 2018 05:19 )  x     / 0.04 ng/mL / 204 U/L / x     / x      CARDIAC MARKERS ( 22 Jul 2018 05:35 )  x     / 0.04 ng/mL / 369 U/L / x     / 6.0 ng/mL  CARDIAC MARKERS ( 21 Jul 2018 22:06 )  x     / 0.05 ng/mL / 529 U/L / x     / 7.1 ng/mL  CARDIAC MARKERS ( 21 Jul 2018 18:10 )  x     / 0.04 ng/mL / 596 U/L / x     / 7.6 ng/mL    LIVER FUNCTIONS - ( 23 Jul 2018 05:19 )  Alb: 3.6 g/dL / Pro: 6.2 g/dL / ALK PHOS: 59 U/L / ALT: 45 U/L / AST: 34 U/L / GGT: x                                                Serum Pro-Brain Natriuretic Peptide: 4508 pg/mL (07.21.18 @ 18:10)    Lactate (07-21-18 @ 18:30): 1.6      MEDICATIONS  (STANDING):  aspirin enteric coated 81 milliGRAM(s) Oral daily  atorvastatin 40 milliGRAM(s) Oral at bedtime  furosemide   Injectable 40 milliGRAM(s) IV Push two times a day  metoprolol tartrate 25 milliGRAM(s) Oral two times a day    MEDICATIONS  (PRN):      Radiology:  Chest xray: Mild hilar congestion; Sternotomy wires; Mitral/Aortic mechanical valves Patient is a 42y old  Male who presents with a chief complaint of Dyspnea on exertion x1 week (22 Jul 2018 10:19)      HPI:  42Y M with pmh of infective endocarditis which developed after tooth infection s/p mechanical mitral and aortic valve replacement about a year ago presented with dyspnea on exertion and chest tightness for 1 week. As per patient the symptoms continued to worsen which is why he came to the ED. He was unable to lie flat and had mild lower extremity edema. He denies palpitations, fevers or chills. In the ED patient was found to have elevated bnp and was given lasix after his cxr showed CHF. (22 Jul 2018 10:19)  Feels better.       PAST MEDICAL & SURGICAL HISTORY:  Mitral valve replaced: metallic valve  Aortic valve replaced: metallic valve  Infective endocarditis, due to unspecified organism, unspecified chronicity  H/O hand surgery  H/O hernia repair  H/O mitral valve replacement  Aortic valve replaced      SOCIAL HX:   Smoking    23 PY stopped 1 year ago       FAMILY HISTORY:  Family history of colon cancer (Grandparent)      Review of system:  See HPI    Allergies    penicillin (Unknown)    PHYSICAL EXAM    ICU Vital Signs Last 24 Hrs  T(C): 36.1 (23 Jul 2018 07:12), Max: 36.6 (22 Jul 2018 22:08)  T(F): 97 (23 Jul 2018 07:12), Max: 97.9 (22 Jul 2018 22:08)  HR: 96 (23 Jul 2018 07:11) (93 - 116)  BP: 104/80 (23 Jul 2018 07:11) (104/80 - 146/79)  BP(mean): 88 (23 Jul 2018 07:11) (88 - 103)  RR: 16 (23 Jul 2018 07:12) (16 - 22)  SpO2: --    I&O's Detail    22 Jul 2018 07:01  -  23 Jul 2018 07:00  --------------------------------------------------------  IN:    Oral Fluid: 5498 mL  Total IN: 5498 mL    OUT:    Voided: 4275 mL  Total OUT: 4275 mL    Total NET: 1223 mL      23 Jul 2018 07:01  -  23 Jul 2018 07:54  --------------------------------------------------------  IN:  Total IN: 0 mL    OUT:    Voided: 1100 mL  Total OUT: 1100 mL    Total NET: -1100 mL      General: Comfortable in bed  HEENT:  On RA  Lungs: Basilar crackles  Cardiovascular: RRR, S1S2  Abdomen: BS+ve; soft non tender  Extremities: No LE edema  Neurological:  AAOx3; No focal deficit      LABS:                          15.3   9.28  )-----------( 198      ( 23 Jul 2018 05:19 )             47.3   07-23    144  |  100  |  26<H>  ----------------------------<  101<H>  4.1   |  31  |  1.4    Ca    8.5      23 Jul 2018 05:19  Mg     2.0     07-23    TPro  6.2  /  Alb  3.6  /  TBili  1.9<H>  /  DBili  x   /  AST  34  /  ALT  45<H>  /  AlkPhos  59  07-23      PT/INR - ( 22 Jul 2018 05:35 )   PT: 36.70 sec;   INR: 3.32 ratio         PTT - ( 21 Jul 2018 18:10 )  PTT:36.7 sec                                           CARDIAC MARKERS ( 23 Jul 2018 05:19 )  x     / 0.04 ng/mL / 204 U/L / x     / x      CARDIAC MARKERS ( 22 Jul 2018 05:35 )  x     / 0.04 ng/mL / 369 U/L / x     / 6.0 ng/mL  CARDIAC MARKERS ( 21 Jul 2018 22:06 )  x     / 0.05 ng/mL / 529 U/L / x     / 7.1 ng/mL  CARDIAC MARKERS ( 21 Jul 2018 18:10 )  x     / 0.04 ng/mL / 596 U/L / x     / 7.6 ng/mL    LIVER FUNCTIONS - ( 23 Jul 2018 05:19 )  Alb: 3.6 g/dL / Pro: 6.2 g/dL / ALK PHOS: 59 U/L / ALT: 45 U/L / AST: 34 U/L / GGT: x                                                Serum Pro-Brain Natriuretic Peptide: 4508 pg/mL (07.21.18 @ 18:10)    Lactate (07-21-18 @ 18:30): 1.6      MEDICATIONS  (STANDING):  aspirin enteric coated 81 milliGRAM(s) Oral daily  atorvastatin 40 milliGRAM(s) Oral at bedtime  furosemide   Injectable 40 milliGRAM(s) IV Push two times a day  metoprolol tartrate 25 milliGRAM(s) Oral two times a day    MEDICATIONS  (PRN):      Radiology:  Chest xray: Mild hilar congestion; Sternotomy wires; Mitral/Aortic mechanical valves Patient is a 42y old  Male who presents with a chief complaint of Dyspnea on exertion x1 week (22 Jul 2018 10:19)      HPI:  42Y M with pmh of infective endocarditis which developed after tooth infection s/p mechanical mitral and aortic valve replacement about a year ago presented with dyspnea on exertion and chest tightness for 1 week. As per patient the symptoms continued to worsen which is why he came to the ED. He was unable to lie flat and had mild lower extremity edema. He denies palpitations, fevers or chills. In the ED patient was found to have elevated bnp and was given lasix after his cxr showed CHF. (22 Jul 2018 10:19)  Feels better.       PAST MEDICAL & SURGICAL HISTORY:  Mitral valve replaced: metallic valve  Aortic valve replaced: metallic valve  Infective endocarditis, due to unspecified organism, unspecified chronicity  H/O hand surgery  H/O hernia repair  H/O mitral valve replacement  Aortic valve replaced      SOCIAL HX:   Smoking    23 PY stopped 1 year ago       FAMILY HISTORY:  Family history of colon cancer (Grandparent)      Review of system:  See HPI    Allergies    penicillin (Unknown)    PHYSICAL EXAM    ICU Vital Signs Last 24 Hrs  T(C): 36.1 (23 Jul 2018 07:12), Max: 36.6 (22 Jul 2018 22:08)  T(F): 97 (23 Jul 2018 07:12), Max: 97.9 (22 Jul 2018 22:08)  HR: 96 (23 Jul 2018 07:11) (93 - 116)  BP: 104/80 (23 Jul 2018 07:11) (104/80 - 146/79)  BP(mean): 88 (23 Jul 2018 07:11) (88 - 103)  RR: 16 (23 Jul 2018 07:12) (16 - 22)      I&O's Detail    22 Jul 2018 07:01  -  23 Jul 2018 07:00  --------------------------------------------------------  IN:    Oral Fluid: 5498 mL  Total IN: 5498 mL    OUT:    Voided: 4275 mL  Total OUT: 4275 mL    Total NET: 1223 mL      23 Jul 2018 07:01  -  23 Jul 2018 07:54  --------------------------------------------------------  IN:  Total IN: 0 mL    OUT:    Voided: 1100 mL  Total OUT: 1100 mL    Total NET: -1100 mL      General: Comfortable in bed  HEENT:  On RA  Lungs: Basilar crackles  Cardiovascular: RRR, S1S2  Abdomen: BS+ve; soft non tender  Extremities: No LE edema  Neurological:  AAOx3; No focal deficit      LABS:                          15.3   9.28  )-----------( 198      ( 23 Jul 2018 05:19 )             47.3   07-23    144  |  100  |  26<H>  ----------------------------<  101<H>  4.1   |  31  |  1.4    Ca    8.5      23 Jul 2018 05:19  Mg     2.0     07-23    TPro  6.2  /  Alb  3.6  /  TBili  1.9<H>  /  DBili  x   /  AST  34  /  ALT  45<H>  /  AlkPhos  59  07-23      PT/INR - ( 22 Jul 2018 05:35 )   PT: 36.70 sec;   INR: 3.32 ratio         PTT - ( 21 Jul 2018 18:10 )  PTT:36.7 sec                                           CARDIAC MARKERS ( 23 Jul 2018 05:19 )  x     / 0.04 ng/mL / 204 U/L / x     / x      CARDIAC MARKERS ( 22 Jul 2018 05:35 )  x     / 0.04 ng/mL / 369 U/L / x     / 6.0 ng/mL  CARDIAC MARKERS ( 21 Jul 2018 22:06 )  x     / 0.05 ng/mL / 529 U/L / x     / 7.1 ng/mL  CARDIAC MARKERS ( 21 Jul 2018 18:10 )  x     / 0.04 ng/mL / 596 U/L / x     / 7.6 ng/mL    LIVER FUNCTIONS - ( 23 Jul 2018 05:19 )  Alb: 3.6 g/dL / Pro: 6.2 g/dL / ALK PHOS: 59 U/L / ALT: 45 U/L / AST: 34 U/L / GGT: x                                                Serum Pro-Brain Natriuretic Peptide: 4508 pg/mL (07.21.18 @ 18:10)    Lactate (07-21-18 @ 18:30): 1.6      MEDICATIONS  (STANDING):  aspirin enteric coated 81 milliGRAM(s) Oral daily  atorvastatin 40 milliGRAM(s) Oral at bedtime  furosemide   Injectable 40 milliGRAM(s) IV Push two times a day  metoprolol tartrate 25 milliGRAM(s) Oral two times a day    MEDICATIONS  (PRN):      Radiology:  Chest xray: Mild hilar congestion; Sternotomy wires; Mitral/Aortic mechanical valves

## 2018-07-24 LAB
ALBUMIN SERPL ELPH-MCNC: 3.8 G/DL — SIGNIFICANT CHANGE UP (ref 3.5–5.2)
ALP SERPL-CCNC: 61 U/L — SIGNIFICANT CHANGE UP (ref 30–115)
ALT FLD-CCNC: 42 U/L — HIGH (ref 0–41)
ANION GAP SERPL CALC-SCNC: 11 MMOL/L — SIGNIFICANT CHANGE UP (ref 7–14)
APTT BLD: 43.4 SEC — HIGH (ref 27–39.2)
AST SERPL-CCNC: 28 U/L — SIGNIFICANT CHANGE UP (ref 0–41)
BILIRUB SERPL-MCNC: 2 MG/DL — HIGH (ref 0.2–1.2)
BUN SERPL-MCNC: 23 MG/DL — HIGH (ref 10–20)
CALCIUM SERPL-MCNC: 8.6 MG/DL — SIGNIFICANT CHANGE UP (ref 8.5–10.1)
CHLORIDE SERPL-SCNC: 99 MMOL/L — SIGNIFICANT CHANGE UP (ref 98–110)
CO2 SERPL-SCNC: 33 MMOL/L — HIGH (ref 17–32)
CREAT SERPL-MCNC: 1.2 MG/DL — SIGNIFICANT CHANGE UP (ref 0.7–1.5)
GLUCOSE SERPL-MCNC: 110 MG/DL — HIGH (ref 70–99)
INR BLD: 3.38 RATIO — HIGH (ref 0.65–1.3)
MAGNESIUM SERPL-MCNC: 2 MG/DL — SIGNIFICANT CHANGE UP (ref 1.8–2.4)
POTASSIUM SERPL-MCNC: 4.3 MMOL/L — SIGNIFICANT CHANGE UP (ref 3.5–5)
POTASSIUM SERPL-SCNC: 4.3 MMOL/L — SIGNIFICANT CHANGE UP (ref 3.5–5)
PROT SERPL-MCNC: 6.6 G/DL — SIGNIFICANT CHANGE UP (ref 6–8)
PROTHROM AB SERPL-ACNC: 37.4 SEC — HIGH (ref 9.95–12.87)
SODIUM SERPL-SCNC: 143 MMOL/L — SIGNIFICANT CHANGE UP (ref 135–146)

## 2018-07-24 RX ORDER — SACUBITRIL AND VALSARTAN 24; 26 MG/1; MG/1
1 TABLET, FILM COATED ORAL
Qty: 0 | Refills: 0 | Status: DISCONTINUED | OUTPATIENT
Start: 2018-07-24 | End: 2018-07-26

## 2018-07-24 RX ORDER — WARFARIN SODIUM 2.5 MG/1
2 TABLET ORAL ONCE
Qty: 0 | Refills: 0 | Status: COMPLETED | OUTPATIENT
Start: 2018-07-24 | End: 2018-07-24

## 2018-07-24 RX ORDER — LISINOPRIL 2.5 MG/1
2.5 TABLET ORAL DAILY
Qty: 0 | Refills: 0 | Status: DISCONTINUED | OUTPATIENT
Start: 2018-07-24 | End: 2018-07-24

## 2018-07-24 RX ORDER — WARFARIN SODIUM 2.5 MG/1
2 TABLET ORAL AT BEDTIME
Qty: 0 | Refills: 0 | Status: DISCONTINUED | OUTPATIENT
Start: 2018-07-24 | End: 2018-07-24

## 2018-07-24 RX ADMIN — Medication 40 MILLIGRAM(S): at 06:14

## 2018-07-24 RX ADMIN — SACUBITRIL AND VALSARTAN 1 TABLET(S): 24; 26 TABLET, FILM COATED ORAL at 18:02

## 2018-07-24 RX ADMIN — Medication 40 MILLIGRAM(S): at 18:02

## 2018-07-24 RX ADMIN — Medication 81 MILLIGRAM(S): at 11:23

## 2018-07-24 RX ADMIN — ATORVASTATIN CALCIUM 40 MILLIGRAM(S): 80 TABLET, FILM COATED ORAL at 21:28

## 2018-07-24 RX ADMIN — WARFARIN SODIUM 2 MILLIGRAM(S): 2.5 TABLET ORAL at 21:29

## 2018-07-24 RX ADMIN — Medication 25 MILLIGRAM(S): at 21:28

## 2018-07-24 RX ADMIN — Medication 25 MILLIGRAM(S): at 10:11

## 2018-07-24 NOTE — PROGRESS NOTE ADULT - SUBJECTIVE AND OBJECTIVE BOX
SUBJECTIVE:    Patient is a 42y old Male who presents with a chief complaint of Dyspnea on exertion x1 week (22 Jul 2018 10:19)    Currently admitted to medicine with the primary diagnosis of Congestive heart failure     Today is hospital day 3d. This morning he is resting comfortably in bed and reports no new issues or overnight events.     PAST MEDICAL & SURGICAL HISTORY  Mitral valve replaced: metallic valve  Aortic valve replaced: metallic valve  Infective endocarditis, due to unspecified organism, unspecified chronicity  H/O hand surgery  H/O hernia repair  H/O mitral valve replacement  Aortic valve replaced    SOCIAL HISTORY:  ex smoker. Quit 1 yr ago     ALLERGIES:  penicillin (Unknown)    MEDICATIONS:  STANDING MEDICATIONS  aspirin enteric coated 81 milliGRAM(s) Oral daily  atorvastatin 40 milliGRAM(s) Oral at bedtime  furosemide    Tablet 40 milliGRAM(s) Oral two times a day  metoprolol tartrate 25 milliGRAM(s) Oral two times a day    PRN MEDICATIONS    VITALS:   T(F): 96.5  HR: 100  BP: 125/76  RR: 16  SpO2: --    LABS:                        15.3   9.28  )-----------( 198      ( 23 Jul 2018 05:19 )             47.3     07-23    144  |  100  |  26<H>  ----------------------------<  101<H>  4.1   |  31  |  1.4    Ca    8.5      23 Jul 2018 05:19  Mg     2.0     07-23    TPro  6.2  /  Alb  3.6  /  TBili  1.9<H>  /  DBili  x   /  AST  34  /  ALT  45<H>  /  AlkPhos  59  07-23    PT/INR - ( 23 Jul 2018 10:59 )   PT: >40.00 sec;   INR: 3.92 ratio         CARDIAC MARKERS ( 23 Jul 2018 05:19 )  x     / 0.04 ng/mL / 204 U/L / x     / x          RADIOLOGY:  < from: Xray Chest 1 View- PORTABLE-Routine (07.23.18 @ 06:46) >  Impression:      Stable diffuse vascular congestion. No effusions.    < end of copied text >        07-23-18 @ 07:01  -  07-24-18 @ 07:00  --------------------------------------------------------  IN: 980 mL / OUT: 3350 mL / NET: -2370 mL    07-24-18 @ 07:01  -  07-24-18 @ 07:40  --------------------------------------------------------  IN: 0 mL / OUT: 675 mL / NET: -675 mL        PHYSICAL EXAM:  GEN: No acute distress  LUNGS: Clear to auscultation bilaterally   HEART: S1/S2 present. RRR.   ABD: Soft, non-tender, non-distended. Bowel sounds present  EXT: NC/NC/NE/2+PP/OSUNA/Skin Intact.   NEURO: AAOX3

## 2018-07-24 NOTE — PROGRESS NOTE ADULT - ASSESSMENT
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     RATLIFF/orthopnea/chest tightness 2/2 CHF exacerbation  -Admit to unit  -c/w po Lasix 40 q12h   -cxr shows vascular congestion   -BNP is 4508  -CE 0.04-->0.05-->0.04  -2 d echo shows EF 20-29%. no regional wall abnormalities. L atrium is moderately dilated. Mod to severe aortic regurg. mild tricuspid regurg    -Cardiology following  -c/w metoprolol. Will increase if patient is tachycardic on ambulation.   - lipids wnl, f/u tsh 1.71 and HbA1c is 5.5  -venous duplex shows no DVT    Mechanical mitral and aortic valve  -coumadin   -keep INR between 2.5-3.5    CKD baseline unavailable  -monitor bmp as could be 2/2 edema  -Creatinine is stable at 1.4 from 1.6 on admission     DVT ppx  -on coumadin with therapeutic INR    GI ppx  -c/w protonix 42Y M with pmh of infective endocarditis which developed after tooth infection s/p mechanical mitral and aortic valve replacement about a year ago presented with dyspnea on exertion and chest tightness for 1 week.     RATLIFF/orthopnea/chest tightness 2/2 CHF exacerbation  -Admit to unit  -c/w po Lasix 40 q12h   -cxr shows vascular congestion   -BNP is 4508  -CE 0.04-->0.05-->0.04  -2 d echo shows EF 20-29%. no regional wall abnormalities. L atrium is moderately dilated. Mod to severe aortic regurg. mild tricuspid regurg    -Cardiology following  -c/w metoprolol. Entresto added to regimen.   - lipids wnl, tsh 1.71 and HbA1c is 5.5  -venous duplex shows no DVT  -Patient has been scheduled for GARRET tomorrow at [facility] by Dr Calderon     Mechanical mitral and aortic valve  -coumadin 2mg for tonight  -keep INR between 2.5-3.5    CKD baseline unavailable  -monitor bmp as could be 2/2 edema  -Creatinine is stable at 1.4 from 1.6 on admission     DVT ppx  -on coumadin with therapeutic INR    GI ppx  -c/w protonix 42Y M with pmh of infective endocarditis which developed after tooth infection s/p mechanical mitral and aortic valve replacement about a year ago presented with dyspnea on exertion and chest tightness for 1 week.     RATLIFF/orthopnea/chest tightness 2/2 CHF exacerbation  -Admit to unit  -c/w po Lasix 40 q12h   -cxr shows vascular congestion   -BNP is 4508  -CE 0.04-->0.05-->0.04  -2 d echo shows EF 20-29%. no regional wall abnormalities. L atrium is moderately dilated. Mod to severe aortic regurg. mild tricuspid regurg    -Cardiology following  -c/w metoprolol. Entresto added to regimen.   - lipids wnl, tsh 1.71 and HbA1c is 5.5  -venous duplex shows no DVT  -Patient has been scheduled for GARRET tomorrow at [facility] by Dr Calderon   -Will keep NPO after midnight.     Mechanical mitral and aortic valve  -coumadin 2mg for tonight  -keep INR between 2.5-3.5    CKD baseline unavailable  -monitor bmp as could be 2/2 edema  -Creatinine is stable at 1.4 from 1.6 on admission     DVT ppx  -on coumadin with therapeutic INR    GI ppx  -c/w protonix

## 2018-07-24 NOTE — PROGRESS NOTE ADULT - ASSESSMENT
Impression:  SOB secondary to pulmonary edema  HO valvular heart disease s/p AVR/MVR  HFrEF and mechanical valvular disease  Exsmoker  CKD    Recommendations:  - Keep I<O; On Lasix PO  - CARDIO FU to asses need for mechanical valvular repair  - COUMADIN F/U INR and adjust to target 2.5-3.5  - Ambulate   - NEEDS NPSG OP  - DVT/ GI PROPHYLAXIS  - Tele as per Cardio

## 2018-07-24 NOTE — PROGRESS NOTE ADULT - SUBJECTIVE AND OBJECTIVE BOX
Patient is a 42y old  Male who presents with a chief complaint of Dyspnea on exertion x1 week (22 Jul 2018 10:19)      T(F): 96.5 (07-24-18 @ 07:13), Max: 98.8 (07-23-18 @ 15:01)  HR: 100 (07-23-18 @ 23:11)  BP: 125/76 (07-23-18 @ 23:11)  RR: 16 (07-24-18 @ 07:13)  SpO2: --    PHYSICAL EXAM:  GENERAL: NAD, well-groomed, well-developed  HEAD:  Atraumatic, Normocephalic  EYES: EOMI, PERRLA, conjunctiva and sclera clear  ENMT: No tonsillar erythema, exudates, or enlargement; Moist mucous membranes, Good dentition, No lesions  NECK: Supple, No JVD, Normal thyroid  NERVOUS SYSTEM:  Alert & Oriented X3,  Motor Strength 5/5 B/L upper and lower extremities  CHEST/LUNG: Clear to percussion bilaterally; No rales, rhonchi, wheezing, or rubs  HEART: Regular rate and rhythm; No murmurs, rubs, or gallops  ABDOMEN: Soft, Nontender, Nondistended; Bowel sounds present  EXTREMITIES:   No clubbing, cyanosis, or edema  LYMPH: No lymphadenopathy noted  SKIN: No rashes or lesions    labs  07-23    144  |  100  |  26<H>  ----------------------------<  101<H>  4.1   |  31  |  1.4    Ca    8.5      23 Jul 2018 05:19  Mg     2.0     07-23    TPro  6.2  /  Alb  3.6  /  TBili  1.9<H>  /  DBili  x   /  AST  34  /  ALT  45<H>  /  AlkPhos  59  07-23                          15.3   9.28  )-----------( 198      ( 23 Jul 2018 05:19 )             47.3       PT/INR - ( 23 Jul 2018 10:59 )   PT: >40.00 sec;   INR: 3.92 ratio                 aspirin enteric coated 81 milliGRAM(s) Oral daily  atorvastatin 40 milliGRAM(s) Oral at bedtime  furosemide    Tablet 40 milliGRAM(s) Oral two times a day  metoprolol tartrate 25 milliGRAM(s) Oral two times a day

## 2018-07-24 NOTE — PROGRESS NOTE ADULT - SUBJECTIVE AND OBJECTIVE BOX
OVER NIGHT EVENTS:  No significant overnight events    PHYSICAL EXAM    ICU Vital Signs Last 24 Hrs  T(C): 35.8 (24 Jul 2018 07:13), Max: 37.1 (23 Jul 2018 15:01)  T(F): 96.5 (24 Jul 2018 07:13), Max: 98.8 (23 Jul 2018 15:01)  HR: 100 (23 Jul 2018 23:11) (100 - 107)  BP: 125/76 (23 Jul 2018 23:11) (123/80 - 129/76)  BP(mean): 94 (23 Jul 2018 23:11) (94 - 94)  RR: 16 (24 Jul 2018 07:13) (16 - 16)  SpO2: --    I&O's Detail    23 Jul 2018 07:01  -  24 Jul 2018 07:00  --------------------------------------------------------  IN:    Oral Fluid: 980 mL  Total IN: 980 mL    OUT:    Voided: 3350 mL  Total OUT: 3350 mL    Total NET: -2370 mL      24 Jul 2018 07:01  -  24 Jul 2018 08:13  --------------------------------------------------------  IN:  Total IN: 0 mL    OUT:    Voided: 675 mL  Total OUT: 675 mL    Total NET: -675 mL    General: Comfortable in bed  HEENT:  On RA  Lungs: CTA  Cardiovascular: RRR, S1S2  Abdomen: BS+ve; soft non tender  Extremities: No LE edema  Neurological:  AAOx3; No focal deficit        LABS:                          15.3   9.28  )-----------( 198      ( 23 Jul 2018 05:19 )             47.3    07-23    144  |  100  |  26<H>  ----------------------------<  101<H>  4.1   |  31  |  1.4    Ca    8.5      23 Jul 2018 05:19  Mg     2.0     07-23    TPro  6.2  /  Alb  3.6  /  TBili  1.9<H>  /  DBili  x   /  AST  34  /  ALT  45<H>  /  AlkPhos  59  07-23      PT/INR - ( 23 Jul 2018 10:59 )   PT: >40.00 sec;   INR: 3.92 ratio                                                 CARDIAC MARKERS ( 23 Jul 2018 05:19 )  x     / 0.04 ng/mL / 204 U/L / x     / x        LIVER FUNCTIONS - ( 23 Jul 2018 05:19 )  Alb: 3.6 g/dL / Pro: 6.2 g/dL / ALK PHOS: 59 U/L / ALT: 45 U/L / AST: 34 U/L / GGT: x               Lactate (07-21-18 @ 18:30): 1.6    MEDICATIONS  (STANDING):  aspirin enteric coated 81 milliGRAM(s) Oral daily  atorvastatin 40 milliGRAM(s) Oral at bedtime  furosemide    Tablet 40 milliGRAM(s) Oral two times a day  metoprolol tartrate 25 milliGRAM(s) Oral two times a day    MEDICATIONS  (PRN):      Radiology:  Cxr: Improved vascular congestion     TTE: EF 20-29%. no regional wall abnormalities. L atrium is moderately dilated. Mod to severe aortic regurg. mild tricuspid regurg OVER NIGHT EVENTS:  No significant overnight events    PHYSICAL EXAM    ICU Vital Signs Last 24 Hrs  T(C): 35.8 (24 Jul 2018 07:13), Max: 37.1 (23 Jul 2018 15:01)  T(F): 96.5 (24 Jul 2018 07:13), Max: 98.8 (23 Jul 2018 15:01)  HR: 100 (23 Jul 2018 23:11) (100 - 107)  BP: 125/76 (23 Jul 2018 23:11) (123/80 - 129/76)  BP(mean): 94 (23 Jul 2018 23:11) (94 - 94)  RR: 16 (24 Jul 2018 07:13) (16 - 16)      I&O's Detail    23 Jul 2018 07:01  -  24 Jul 2018 07:00  --------------------------------------------------------  IN:    Oral Fluid: 980 mL  Total IN: 980 mL    OUT:    Voided: 3350 mL  Total OUT: 3350 mL    Total NET: -2370 mL      24 Jul 2018 07:01  -  24 Jul 2018 08:13  --------------------------------------------------------  IN:  Total IN: 0 mL    OUT:    Voided: 675 mL  Total OUT: 675 mL    Total NET: -675 mL    General: Comfortable in bed  HEENT:  On RA  Lungs: CTA  Cardiovascular: RRR, S1S2  Abdomen: BS+ve; soft non tender  Extremities: No LE edema  Neurological:  AAOx3; No focal deficit        LABS:                          15.3   9.28  )-----------( 198      ( 23 Jul 2018 05:19 )             47.3    07-23    144  |  100  |  26<H>  ----------------------------<  101<H>  4.1   |  31  |  1.4    Ca    8.5      23 Jul 2018 05:19  Mg     2.0     07-23    TPro  6.2  /  Alb  3.6  /  TBili  1.9<H>  /  DBili  x   /  AST  34  /  ALT  45<H>  /  AlkPhos  59  07-23      PT/INR - ( 23 Jul 2018 10:59 )   PT: >40.00 sec;   INR: 3.92 ratio                                                 CARDIAC MARKERS ( 23 Jul 2018 05:19 )  x     / 0.04 ng/mL / 204 U/L / x     / x        LIVER FUNCTIONS - ( 23 Jul 2018 05:19 )  Alb: 3.6 g/dL / Pro: 6.2 g/dL / ALK PHOS: 59 U/L / ALT: 45 U/L / AST: 34 U/L / GGT: x               Lactate (07-21-18 @ 18:30): 1.6    MEDICATIONS  (STANDING):  aspirin enteric coated 81 milliGRAM(s) Oral daily  atorvastatin 40 milliGRAM(s) Oral at bedtime  furosemide    Tablet 40 milliGRAM(s) Oral two times a day  metoprolol tartrate 25 milliGRAM(s) Oral two times a day    MEDICATIONS  (PRN):      Radiology:  Cxr: Improved vascular congestion     TTE: EF 20-29%. no regional wall abnormalities. L atrium is moderately dilated. Mod to severe aortic regurg. mild tricuspid regurg

## 2018-07-24 NOTE — PROGRESS NOTE ADULT - ASSESSMENT
Patient feels better . Able lay flat. Echo lv impaired ai. He will need GARRET . Will need ace or arb. May need life vest

## 2018-07-25 ENCOUNTER — TRANSCRIPTION ENCOUNTER (OUTPATIENT)
Age: 43
End: 2018-07-25

## 2018-07-25 LAB
ALBUMIN SERPL ELPH-MCNC: 3.9 G/DL — SIGNIFICANT CHANGE UP (ref 3.5–5.2)
ALP SERPL-CCNC: 60 U/L — SIGNIFICANT CHANGE UP (ref 30–115)
ALT FLD-CCNC: 35 U/L — SIGNIFICANT CHANGE UP (ref 0–41)
ANION GAP SERPL CALC-SCNC: 10 MMOL/L — SIGNIFICANT CHANGE UP (ref 7–14)
APTT BLD: 38.5 SEC — SIGNIFICANT CHANGE UP (ref 27–39.2)
AST SERPL-CCNC: 25 U/L — SIGNIFICANT CHANGE UP (ref 0–41)
BILIRUB SERPL-MCNC: 1.9 MG/DL — HIGH (ref 0.2–1.2)
BUN SERPL-MCNC: 19 MG/DL — SIGNIFICANT CHANGE UP (ref 10–20)
CALCIUM SERPL-MCNC: 8.9 MG/DL — SIGNIFICANT CHANGE UP (ref 8.5–10.1)
CHLORIDE SERPL-SCNC: 99 MMOL/L — SIGNIFICANT CHANGE UP (ref 98–110)
CO2 SERPL-SCNC: 33 MMOL/L — HIGH (ref 17–32)
CREAT SERPL-MCNC: 1.3 MG/DL — SIGNIFICANT CHANGE UP (ref 0.7–1.5)
GLUCOSE SERPL-MCNC: 107 MG/DL — HIGH (ref 70–99)
INR BLD: 2.29 RATIO — HIGH (ref 0.65–1.3)
POTASSIUM SERPL-MCNC: 4.8 MMOL/L — SIGNIFICANT CHANGE UP (ref 3.5–5)
POTASSIUM SERPL-SCNC: 4.8 MMOL/L — SIGNIFICANT CHANGE UP (ref 3.5–5)
PROT SERPL-MCNC: 6.7 G/DL — SIGNIFICANT CHANGE UP (ref 6–8)
PROTHROM AB SERPL-ACNC: 25.1 SEC — HIGH (ref 9.95–12.87)
SODIUM SERPL-SCNC: 142 MMOL/L — SIGNIFICANT CHANGE UP (ref 135–146)

## 2018-07-25 RX ORDER — FUROSEMIDE 40 MG
1 TABLET ORAL
Qty: 30 | Refills: 0
Start: 2018-07-25 | End: 2018-08-23

## 2018-07-25 RX ORDER — WARFARIN SODIUM 2.5 MG/1
2.5 TABLET ORAL ONCE
Qty: 0 | Refills: 0 | Status: COMPLETED | OUTPATIENT
Start: 2018-07-25 | End: 2018-07-25

## 2018-07-25 RX ORDER — SACUBITRIL AND VALSARTAN 24; 26 MG/1; MG/1
1 TABLET, FILM COATED ORAL
Qty: 60 | Refills: 0 | OUTPATIENT
Start: 2018-07-25 | End: 2018-08-23

## 2018-07-25 RX ORDER — ATORVASTATIN CALCIUM 80 MG/1
1 TABLET, FILM COATED ORAL
Qty: 30 | Refills: 0
Start: 2018-07-25 | End: 2018-08-23

## 2018-07-25 RX ADMIN — Medication 40 MILLIGRAM(S): at 05:28

## 2018-07-25 RX ADMIN — ATORVASTATIN CALCIUM 40 MILLIGRAM(S): 80 TABLET, FILM COATED ORAL at 21:45

## 2018-07-25 RX ADMIN — Medication 25 MILLIGRAM(S): at 21:45

## 2018-07-25 RX ADMIN — SACUBITRIL AND VALSARTAN 1 TABLET(S): 24; 26 TABLET, FILM COATED ORAL at 05:27

## 2018-07-25 RX ADMIN — Medication 25 MILLIGRAM(S): at 12:18

## 2018-07-25 RX ADMIN — SACUBITRIL AND VALSARTAN 1 TABLET(S): 24; 26 TABLET, FILM COATED ORAL at 17:54

## 2018-07-25 RX ADMIN — WARFARIN SODIUM 2.5 MILLIGRAM(S): 2.5 TABLET ORAL at 21:45

## 2018-07-25 NOTE — PROGRESS NOTE ADULT - ASSESSMENT
Impression:  SOB secondary to pulmonary edema  HO valvular heart disease s/p AVR/MVR  HFrEF and mechanical valvular disease  Exsmoker  CKD    Recommendations:  - Keep I<O; On Lasix PO  - CARDIO FU; GARRET today  - COUMADIN F/U INR and adjust to target 2.5-3.5  - Ambulate   - NEEDS NPSG OP  - DVT/ GI PROPHYLAXIS  - Tele as per Cardio

## 2018-07-25 NOTE — DISCHARGE NOTE ADULT - CARE PROVIDER_API CALL
Mane Macias), Cardiovascular Disease; Internal Medicine  [address] [place] [id number]  Phone: [phone]  Fax: [phone] Mane Macias), Cardiovascular Disease; Internal Medicine  [address] [place] [address]  Phone: [phone]  Fax: [phone]    coumadin, clinic  On Tuesday at 10AM  Phone: (   )    -  Fax: (   )    -

## 2018-07-25 NOTE — DISCHARGE NOTE ADULT - PLAN OF CARE
clinical stability. Prevention of CHF exacerbations ECHO showed EF of 20-29%. Patient will be discharged with life vest. His medication regimen has been extended with entresto, atorvastatin and lasix. ECHO showed EF of 20-29%. Patient will be discharged with life vest. His medication regimen has been extended with losartan, atorvastatin and lasix. His metoprolol has been changed to 12.5mg q6h due to his borderline blood pressure. Repeat ECHO in 3 months. ECHO showed EF of 20-29%. Patient will be discharged with life vest. His medication regimen has been extended with losartan, atorvastatin and lasix. His metoprolol has been changed to 12.5mg q6h due to his borderline blood pressure. Repeat ECHO in 3 months. Patient's father who is a registered nurse agrees to give him his lovenox injections. ECHO showed EF of 20-29%. Patient will be discharged with life vest. His medication regimen has been extended with losartan, atorvastatin and lasix. His metoprolol has been changed to 12.5mg q6h due to his borderline blood pressure. Repeat ECHO in 3 months. Patient's father who is a registered nurse agrees to give him his lovenox injections. Patient has been scheduled for coumadin clinic at [facility] on Tuesday at 10AM.

## 2018-07-25 NOTE — DISCHARGE NOTE ADULT - NS AS DC FOLLOWUP STROKE INST
Coumadin/Warfarin Coumadin/Warfarin/Influenza vaccination (VIS Pub Date: August 7, 2015)/Smoking Cessation

## 2018-07-25 NOTE — DISCHARGE NOTE ADULT - HOSPITAL COURSE
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 Patient was found to be in acute CHF. His cardiac enzymes were mildly elevated and his BNP was 4508. ECHO done showed EF of 20-29% and severe aortic regurgitation. He was sent to [facility] for GARRET which showed trace paravalvular aortic regurg and moderate TR. He had a CATH done with his aortic and mitral valve replacement which did not show any significant CAD. Patient is stable and his symptoms have resolved. He is being discharged on entresto, atorvastatin and lasix in adition to his home meds. 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 Patient was found to be in acute CHF. His cardiac enzymes were mildly elevated and his BNP was 4508. ECHO done showed EF of 20-29% and severe aortic regurgitation. He was sent to [facility] for GARRET which showed trace paravalvular aortic regurg and moderate TR. He had a CATH done with his aortic and mitral valve replacement which did not show any significant CAD. Patient is stable and his symptoms have resolved. He is being discharged on losartan, atorvastatin and lasix. His metoprolol was switched to 12.5mg q6h due to low blood pressure. 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 Patient was found to be in acute CHF. His cardiac enzymes were mildly elevated and his BNP was 4508. ECHO done showed EF of 20-29% and severe aortic regurgitation. He was sent to [facility] for GARRET which showed trace paravalvular aortic regurg and moderate TR. He had a CATH done with his aortic and mitral valve replacement which did not show any significant CAD. Patient is stable and his symptoms have resolved. He is being discharged on losartan, atorvastatin and lasix. His metoprolol was switched to 50mg q24h. Patient has been scheduled for coumadin clinic at [facility] on Tuesday at 10AM.

## 2018-07-25 NOTE — PROGRESS NOTE ADULT - SUBJECTIVE AND OBJECTIVE BOX
GARRET Procedure Note:     After risks, benefits and alternatives of the procedure were explained, consent was signed and placed in the medical record.  Procedural timeout was taken.  Sedation was administered by anesthesia.  GARRET probe inserted without complication and GARRET performed.  Patient tolerated the procedure well without complication.  Post-procedure vital signs were stable.    GARRET findings discussed with patient and referring cardiologist.       GARRET findings:  LVEF :20  LV: dilated  RV: moderately reduced EF  AV: mechanical, normal function, trace paravalvular regurgitation  MV: Bileaflet mechanical, normal function, mild MR  TV: Moderate TR  PV: NL  RV systolic pressure: ar least 46  NO vegetation/thrombus appreciated    See full report for findings.      Post procedure vitals:T(C): --  HR: 90 (07-25-18 @ 08:09)  BP: 106/61 (07-25-18 @ 08:09)  RR:20  SpO2: 97% (07-25-18 @ 08:09)        Recommendations:  Continue current medications.  Follow up with referring cardiologist in near future. GARRET Procedure Note:     After risks, benefits and alternatives of the procedure were explained, consent was signed and placed in the medical record.  Procedural timeout was taken.  Sedation was administered by anesthesia.  GARRET probe inserted without complication and GARRET performed.  Patient tolerated the procedure well without complication.  Post-procedure vital signs were stable.    GARRET findings discussed with patient and referring cardiologist.       GARRET findings:  LVEF :20  LV: dilated  RV: moderately reduced EF  AV: mechanical, normal function, trace paravalvular regurgitation  MV: Bileaflet mechanical, normal function, mild MR  TV: Moderate TR  PV: NL  RV systolic pressure: ar least 46  NO vegetation/thrombus appreciated, BOTH MECHANICAL VALVES FUNCTIONING NORMAL, NO MAJOR REGURGITATION, BUT SEVERE BIV CARDIOMYOPATHY    See full report for findings.      Post procedure vitals:T(C): --  HR: 90 (07-25-18 @ 08:09)  BP: 106/61 (07-25-18 @ 08:09)  RR:20  SpO2: 97% (07-25-18 @ 08:09)        Recommendations:  Continue current medications.  Follow up with referring cardiologist  optimize HF managment

## 2018-07-25 NOTE — DISCHARGE NOTE ADULT - CARE PLAN
Principal Discharge DX:	Acute systolic congestive heart failure  Goal:	clinical stability. Prevention of CHF exacerbations  Assessment and plan of treatment:	ECHO showed EF of 20-29%. Patient will be discharged with life vest. His medication regimen has been extended with entresto, atorvastatin and lasix. Principal Discharge DX:	Acute systolic congestive heart failure  Goal:	clinical stability. Prevention of CHF exacerbations  Assessment and plan of treatment:	ECHO showed EF of 20-29%. Patient will be discharged with life vest. His medication regimen has been extended with losartan, atorvastatin and lasix. His metoprolol has been changed to 12.5mg q6h due to his borderline blood pressure. Repeat ECHO in 3 months. Principal Discharge DX:	Acute systolic congestive heart failure  Goal:	clinical stability. Prevention of CHF exacerbations  Assessment and plan of treatment:	ECHO showed EF of 20-29%. Patient will be discharged with life vest. His medication regimen has been extended with losartan, atorvastatin and lasix. His metoprolol has been changed to 12.5mg q6h due to his borderline blood pressure. Repeat ECHO in 3 months. Patient's father who is a registered nurse agrees to give him his lovenox injections. Principal Discharge DX:	Acute systolic congestive heart failure  Goal:	clinical stability. Prevention of CHF exacerbations  Assessment and plan of treatment:	ECHO showed EF of 20-29%. Patient will be discharged with life vest. His medication regimen has been extended with losartan, atorvastatin and lasix. His metoprolol has been changed to 12.5mg q6h due to his borderline blood pressure. Repeat ECHO in 3 months. Patient's father who is a registered nurse agrees to give him his lovenox injections. Patient has been scheduled for coumadin clinic at [facility] on Tuesday at 10AM.

## 2018-07-25 NOTE — DISCHARGE NOTE ADULT - MEDICATION SUMMARY - MEDICATIONS TO TAKE
I will START or STAY ON the medications listed below when I get home from the hospital:    sacubitril-valsartan 24 mg-26 mg oral tablet  -- 1 tab(s) by mouth 2 times a day  -- Indication: For Congestive heart failure    Coumadin 5 mg oral tablet  -- orally once a day on M W F and sunday   -- Indication: For H/O mitral valve replacement    Coumadin  -- 2.5 milligram(s) by mouth once a day on the other days  -- Indication: For H/O mitral valve replacement    atorvastatin 40 mg oral tablet  -- 1 tab(s) by mouth once a day (at bedtime)  -- Indication: For Congestive heart failure    metoprolol succinate 25 mg oral tablet, extended release  -- 1 tab(s) by mouth once a day  -- Indication: For Congestive heart failure    furosemide 40 mg oral tablet  -- 1 tab(s) by mouth once a day   -- Indication: For Congestive heart failure I will START or STAY ON the medications listed below when I get home from the hospital:    losartan 50 mg oral tablet  -- 1 tab(s) by mouth once a day  -- Indication: For Acute systolic congestive heart failure    enoxaparin 100 mg/mL injectable solution  -- 100 milligram(s) subcutaneously 2 times a day   -- It is very important that you take or use this exactly as directed.  Do not skip doses or discontinue unless directed by your doctor.    -- Indication: For H/O mitral valve replacement    Coumadin 5 mg oral tablet  -- orally once a day on M W F and sunday   -- Indication: For H/O mitral valve replacement    Coumadin  -- 2.5 milligram(s) by mouth once a day on the other days  -- Indication: For H/O mitral valve replacement    atorvastatin 40 mg oral tablet  -- 1 tab(s) by mouth once a day (at bedtime)  -- Indication: For Congestive heart failure    Metoprolol Tartrate 25 mg oral tablet  -- 12.5 milligram(s) by mouth 4 times a day   -- It is very important that you take or use this exactly as directed.  Do not skip doses or discontinue unless directed by your doctor.  May cause drowsiness.  Alcohol may intensify this effect.  Use care when operating dangerous machinery.  Some non-prescription drugs may aggravate your condition.  Read all labels carefully.  If a warning appears, check with your doctor before taking.  Take with food or milk.  This drug may impair the ability to drive or operate machinery.  Use care until you become familiar with its effects.    -- Indication: For Acute systolic congestive heart failure    furosemide 40 mg oral tablet  -- 1 tab(s) by mouth once a day   -- Indication: For Congestive heart failure I will START or STAY ON the medications listed below when I get home from the hospital:    losartan 50 mg oral tablet  -- 1 tab(s) by mouth once a day  -- Indication: For Acute systolic congestive heart failure    enoxaparin 100 mg/mL injectable solution  -- 100 milligram(s) subcutaneously 2 times a day   -- It is very important that you take or use this exactly as directed.  Do not skip doses or discontinue unless directed by your doctor.    -- Indication: For H/O mitral valve replacement    Coumadin 5 mg oral tablet  -- orally once a day on M W F and sunday   -- Indication: For H/O mitral valve replacement    Coumadin  -- 2.5 milligram(s) by mouth once a day on the other days  -- Indication: For H/O mitral valve replacement    atorvastatin 40 mg oral tablet  -- 1 tab(s) by mouth once a day (at bedtime)  -- Indication: For Congestive heart failure    metoprolol succinate 50 mg oral tablet, extended release  -- 1 tab(s) by mouth once a day   -- It is very important that you take or use this exactly as directed.  Do not skip doses or discontinue unless directed by your doctor.  May cause drowsiness.  Alcohol may intensify this effect.  Use care when operating dangerous machinery.  Some non-prescription drugs may aggravate your condition.  Read all labels carefully.  If a warning appears, check with your doctor before taking.  Swallow whole.  Do not crush.  Take with food or milk.  This drug may impair the ability to drive or operate machinery.  Use care until you become familiar with its effects.    -- Indication: For Acute systolic congestive heart failure    furosemide 40 mg oral tablet  -- 1 tab(s) by mouth once a day   -- Indication: For Congestive heart failure

## 2018-07-25 NOTE — DISCHARGE NOTE ADULT - PATIENT PORTAL LINK FT
You can access the [facility] Patient Portal, offered by [facility], by registering with the following website: [email or web address]

## 2018-07-25 NOTE — PROGRESS NOTE ADULT - SUBJECTIVE AND OBJECTIVE BOX
OVER NIGHT EVENTS:  No significant events overnight.    PHYSICAL EXAM    ICU Vital Signs Last 24 Hrs  T(C): 36.4 (24 Jul 2018 23:27), Max: 36.4 (24 Jul 2018 23:27)  T(F): 97.5 (24 Jul 2018 23:27), Max: 97.5 (24 Jul 2018 23:27)  HR: 90 (25 Jul 2018 08:09) (90 - 114)  BP: 106/61 (25 Jul 2018 08:09) (91/54 - 125/89)  BP(mean): 76 (25 Jul 2018 08:09) (76 - 100)  RR: 17 (24 Jul 2018 23:27) (16 - 17)  SpO2: 97% (25 Jul 2018 08:09) (95% - 98%)    I&O's Detail    24 Jul 2018 07:01  -  25 Jul 2018 07:00  --------------------------------------------------------  IN:    Oral Fluid: 240 mL  Total IN: 240 mL    OUT:    Voided: 2500 mL  Total OUT: 2500 mL    Total NET: -2260 mL    General: Comfortable in bed  HEENT:  On RA  Lungs: CTA  Cardiovascular: RRR, S1S2  Abdomen: BS+ve; soft non tender  Extremities: No LE edema  Neurological:  AAOx3; No focal deficit      LABS:    07-25    142  |  99  |  19  ----------------------------<  107<H>  4.8   |  33<H>  |  1.3    Ca    8.9      25 Jul 2018 07:00  Mg     2.0     07-24    TPro  6.7  /  Alb  3.9  /  TBili  1.9<H>  /  DBili  x   /  AST  25  /  ALT  35  /  AlkPhos  60  07-25      PT/INR - ( 25 Jul 2018 07:00 )   PT: 25.10 sec;   INR: 2.29 ratio         PTT - ( 25 Jul 2018 07:00 )  PTT:38.5 sec                                             LIVER FUNCTIONS - ( 25 Jul 2018 07:00 )  Alb: 3.9 g/dL / Pro: 6.7 g/dL / ALK PHOS: 60 U/L / ALT: 35 U/L / AST: 25 U/L / GGT: x           Lactate (07-21-18 @ 18:30): 1.6      MEDICATIONS  (STANDING):  aspirin enteric coated 81 milliGRAM(s) Oral daily  atorvastatin 40 milliGRAM(s) Oral at bedtime  furosemide    Tablet 40 milliGRAM(s) Oral two times a day  metoprolol tartrate 25 milliGRAM(s) Oral two times a day  sacubitril 24 mG/valsartan 26 mG 1 Tablet(s) Oral two times a day    MEDICATIONS  (PRN):      Radiology:

## 2018-07-25 NOTE — PROGRESS NOTE ADULT - ASSESSMENT
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     resolved acute systolic CHF exacerbation  -c/w po Lasix 40 q12h   -cxr now normal  -GARRET shows normal functioning valves but severely impaired LV function  -c/w metoprolol. Entresto added to regimen.   -may dc home today with life vest and out patient cardio follow up  -40 min spent on dc    Mechanical mitral and aortic valve  -continue home coumadin regimen   -keep INR between 2.5-3.5    CKD baseline unavailable  -Creatinine is stable at 1.3 from 1.6 on admission     DVT ppx  -on coumadin with therapeutic INR

## 2018-07-25 NOTE — DISCHARGE NOTE ADULT - CARE PROVIDERS DIRECT ADDRESSES
[email or web address]_Unknown

## 2018-07-25 NOTE — PROGRESS NOTE ADULT - SUBJECTIVE AND OBJECTIVE BOX
Patient is s/p GARRET at [facility], both mitral and aortic valves functioning well without any significant regurgitations. However the EF is low at 20%. Pt denies SOB or any discomfort.       T(F): 97.5 (07-24-18 @ 23:27), Max: 97.5 (07-24-18 @ 23:27)  HR: 110 (07-25-18 @ 11:55)  BP: 119/74 (07-25-18 @ 11:55)  RR: 16 (07-25-18 @ 11:55)  SpO2: 98% (07-25-18 @ 11:55) (95% - 98%)    PHYSICAL EXAM:  GENERAL: NAD  HEAD:  Atraumatic, Normocephalic  NERVOUS SYSTEM:  Alert & Oriented X3, no focal deficit  CHEST/LUNG: Clear to percussion bilaterally; No rales, rhonchi, wheezing, or rubs  HEART: systolic click  ABDOMEN: Soft, Nontender, Nondistended; Bowel sounds present  EXTREMITIES:  2+ Peripheral Pulses, No clubbing, cyanosis, or edema  LYMPH: No lymphadenopathy noted  SKIN: No rashes or lesions    LABS  07-25    142  |  99  |  19  ----------------------------<  107<H>  4.8   |  33<H>  |  1.3    Ca    8.9      25 Jul 2018 07:00  Mg     2.0     07-24    TPro  6.7  /  Alb  3.9  /  TBili  1.9<H>  /  DBili  x   /  AST  25  /  ALT  35  /  AlkPhos  60  07-25      PT/INR - ( 25 Jul 2018 07:00 )   PT: 25.10 sec;   INR: 2.29 ratio         PTT - ( 25 Jul 2018 07:00 )  PTT:38.5 sec    CARDIAC ENZYMES  Creatine Kinase, Serum: 204 (07-23 @ 05:19)      Troponin T, Serum: 0.04 ng/mL (07-23-18 @ 05:19)        RADIOLOGY  < from: Xray Chest 1 View- PORTABLE-Routine (07.25.18 @ 05:08) >  Impression:      No radiographic evidence of acute cardiopulmonary disease.      < end of copied text >    MEDICATIONS  (STANDING):  aspirin enteric coated 81 milliGRAM(s) Oral daily  atorvastatin 40 milliGRAM(s) Oral at bedtime  furosemide    Tablet 40 milliGRAM(s) Oral two times a day  metoprolol tartrate 25 milliGRAM(s) Oral two times a day  sacubitril 24 mG/valsartan 26 mG 1 Tablet(s) Oral two times a day    MEDICATIONS  (PRN):

## 2018-07-25 NOTE — DISCHARGE NOTE ADULT - PROVIDER TOKENS
KELSEY:'[id number]' TOKEN:'[id number]',FREE:[LAST:[coumadin],FIRST:[clinic],PHONE:[(   )    -],FAX:[(   )    -],ADDRESS:[On Tuesday at 10AM]]

## 2018-07-26 DIAGNOSIS — I50.9 HEART FAILURE, UNSPECIFIED: ICD-10-CM

## 2018-07-26 LAB
APTT BLD: 39.1 SEC — SIGNIFICANT CHANGE UP (ref 27–39.2)
INR BLD: 1.75 RATIO — HIGH (ref 0.65–1.3)
PROTHROM AB SERPL-ACNC: 19.1 SEC — HIGH (ref 9.95–12.87)

## 2018-07-26 RX ORDER — LOSARTAN POTASSIUM 100 MG/1
50 TABLET, FILM COATED ORAL DAILY
Qty: 0 | Refills: 0 | Status: DISCONTINUED | OUTPATIENT
Start: 2018-07-26 | End: 2018-07-27

## 2018-07-26 RX ORDER — METOPROLOL TARTRATE 50 MG
12.5 TABLET ORAL
Qty: 0 | Refills: 0 | Status: DISCONTINUED | OUTPATIENT
Start: 2018-07-26 | End: 2018-07-27

## 2018-07-26 RX ORDER — WARFARIN SODIUM 2.5 MG/1
5 TABLET ORAL ONCE
Qty: 0 | Refills: 0 | Status: COMPLETED | OUTPATIENT
Start: 2018-07-26 | End: 2018-07-26

## 2018-07-26 RX ADMIN — Medication 12.5 MILLIGRAM(S): at 18:17

## 2018-07-26 RX ADMIN — WARFARIN SODIUM 5 MILLIGRAM(S): 2.5 TABLET ORAL at 21:21

## 2018-07-26 RX ADMIN — Medication 12.5 MILLIGRAM(S): at 12:05

## 2018-07-26 RX ADMIN — Medication 40 MILLIGRAM(S): at 05:47

## 2018-07-26 RX ADMIN — ATORVASTATIN CALCIUM 40 MILLIGRAM(S): 80 TABLET, FILM COATED ORAL at 21:21

## 2018-07-26 RX ADMIN — Medication 12.5 MILLIGRAM(S): at 23:19

## 2018-07-26 RX ADMIN — Medication 81 MILLIGRAM(S): at 12:05

## 2018-07-26 RX ADMIN — LOSARTAN POTASSIUM 50 MILLIGRAM(S): 100 TABLET, FILM COATED ORAL at 16:55

## 2018-07-26 RX ADMIN — Medication 40 MILLIGRAM(S): at 18:17

## 2018-07-26 NOTE — PROGRESS NOTE ADULT - ASSESSMENT
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     RATLIFF/orthopnea/chest tightness 2/2 CHF exacerbation  -Admit to unit  -c/w po Lasix 40 q12h. Will discharge on lasix 40mg q24h   -cxr has cleared up   -BNP is 4508  -CE 0.04-->0.05-->0.04  -2 d echo shows EF 20-29%. no regional wall abnormalities. L atrium is moderately dilated. Mod to severe aortic regurg. mild tricuspid regurg    -GARRET showed EF 20% and trace paraaortic regurg   -Cardiology following  -c/w metoprolol. Entresto added to regimen.   - lipids wnl, tsh 1.71 and HbA1c is 5.5  -venous duplex shows no DVT      Mechanical mitral and aortic valve  -c/w coumadin  -keep INR between 2.5-3.5    CKD baseline unavailable  -monitor bmp as could be 2/2 edema  -Creatinine is improved at 1.3    DVT ppx  -on coumadin with therapeutic INR    GI ppx  -c/w protonix

## 2018-07-26 NOTE — PROGRESS NOTE ADULT - ASSESSMENT
Impression:  SOB secondary to pulmonary edema  HO valvular heart disease s/p AVR/MVR  HFrEF   Exsmoker  CKD    Recommendations:  - Keep I<O; On Lasix PO  - CARDIO FU   - COUMADIN F/U INR and adjust to target 2.5-3.5  - Ambulate   - NEEDS NPSG OP  - DVT/ GI PROPHYLAXIS  - Plan as per cardio

## 2018-07-26 NOTE — PROGRESS NOTE ADULT - SUBJECTIVE AND OBJECTIVE BOX
Patient is a 42y old  Male who presents with a chief complaint of Dyspnea on exertion x1 week (25 Jul 2018 14:15)      T(F): 98.4 (07-26-18 @ 07:20), Max: 98.4 (07-26-18 @ 07:20)  HR: 104 (07-26-18 @ 07:13)  BP: 97/62 (07-26-18 @ 07:13)  RR: 18 (07-26-18 @ 07:20)  SpO2: 94% (07-26-18 @ 05:46) (94% - 98%)    PHYSICAL EXAM:  GENERAL: NAD, well-groomed, well-developed  HEAD:  Atraumatic, Normocephalic  EYES: EOMI, PERRLA, conjunctiva and sclera clear  ENMT: No tonsillar erythema, exudates, or enlargement; Moist mucous membranes, Good dentition, No lesions  NECK: Supple, No JVD, Normal thyroid  NERVOUS SYSTEM:  Alert & Oriented X3,  Motor Strength 5/5 B/L upper and lower extremities  CHEST/LUNG: Clear to percussion bilaterally; No rales, rhonchi, wheezing, or rubs  HEART: Regular rate and rhythm; No murmurs, rubs, or gallops  ABDOMEN: Soft, Nontender, Nondistended; Bowel sounds present  EXTREMITIES:   No clubbing, cyanosis, or edema  LYMPH: No lymphadenopathy noted  SKIN: No rashes or lesions    labs  07-25    142  |  99  |  19  ----------------------------<  107<H>  4.8   |  33<H>  |  1.3    Ca    8.9      25 Jul 2018 07:00    TPro  6.7  /  Alb  3.9  /  TBili  1.9<H>  /  DBili  x   /  AST  25  /  ALT  35  /  AlkPhos  60  07-25        PT/INR - ( 25 Jul 2018 07:00 )   PT: 25.10 sec;   INR: 2.29 ratio         PTT - ( 25 Jul 2018 07:00 )  PTT:38.5 sec        aspirin enteric coated 81 milliGRAM(s) Oral daily  atorvastatin 40 milliGRAM(s) Oral at bedtime  furosemide    Tablet 40 milliGRAM(s) Oral two times a day  losartan 50 milliGRAM(s) Oral daily  metoprolol tartrate 12.5 milliGRAM(s) Oral four times a day

## 2018-07-26 NOTE — PROGRESS NOTE ADULT - ASSESSMENT
Appreciate  GARRET . Mechanical valves good. Patient has life vest. He does not want entresto no. Begin arb. Increase beta. Repeat echo in 3 mos.

## 2018-07-26 NOTE — PROGRESS NOTE ADULT - SUBJECTIVE AND OBJECTIVE BOX
SUBJECTIVE:    Patient is a 42y old Male who presents with a chief complaint of Dyspnea on exertion x1 week (25 Jul 2018 14:15)    Currently admitted to medicine with the primary diagnosis of Acute systolic congestive heart failure     Today is hospital day 5d. This morning he is resting comfortably in bed and reports no new issues or overnight events.     PAST MEDICAL & SURGICAL HISTORY  Mitral valve replaced: metallic valve  Aortic valve replaced: metallic valve  Infective endocarditis, due to unspecified organism, unspecified chronicity  H/O hand surgery  H/O hernia repair  H/O mitral valve replacement  Aortic valve replaced    SOCIAL HISTORY:  Negative for smoking/alcohol/drug use.     ALLERGIES:  penicillin (Unknown)    MEDICATIONS:  STANDING MEDICATIONS  aspirin enteric coated 81 milliGRAM(s) Oral daily  atorvastatin 40 milliGRAM(s) Oral at bedtime  furosemide    Tablet 40 milliGRAM(s) Oral two times a day  metoprolol tartrate 25 milliGRAM(s) Oral two times a day  sacubitril 24 mG/valsartan 26 mG 1 Tablet(s) Oral two times a day    PRN MEDICATIONS    VITALS:   T(F): 98.4  HR: 104  BP: 97/62  RR: 18  SpO2: 94%    LABS:    07-25    142  |  99  |  19  ----------------------------<  107<H>  4.8   |  33<H>  |  1.3    Ca    8.9      25 Jul 2018 07:00    TPro  6.7  /  Alb  3.9  /  TBili  1.9<H>  /  DBili  x   /  AST  25  /  ALT  35  /  AlkPhos  60  07-25    PT/INR - ( 25 Jul 2018 07:00 )   PT: 25.10 sec;   INR: 2.29 ratio         PTT - ( 25 Jul 2018 07:00 )  PTT:38.5 sec      RADIOLOGY:  < from: Xray Chest 1 View- PORTABLE-Routine (07.25.18 @ 05:08) >  Impression:      No radiographic evidence of acute cardiopulmonary disease.    < end of copied text >      07-25-18 @ 07:01  -  07-26-18 @ 07:00  --------------------------------------------------------  IN: 1100 mL / OUT: 875 mL / NET: 225 mL        PHYSICAL EXAM:  GEN: No acute distress  LUNGS: Clear to auscultation bilaterally   HEART: S1/S2 present. tachycardic   ABD: Soft, non-tender, non-distended. Bowel sounds present  EXT: NC/NC/NE/2+PP/OSUNA/Skin Intact.   NEURO: AAOX3

## 2018-07-27 VITALS — RESPIRATION RATE: 18 BRPM | HEART RATE: 104 BPM | SYSTOLIC BLOOD PRESSURE: 108 MMHG | DIASTOLIC BLOOD PRESSURE: 67 MMHG

## 2018-07-27 LAB
APTT BLD: 34.4 SEC — SIGNIFICANT CHANGE UP (ref 27–39.2)
INR BLD: 1.61 RATIO — HIGH (ref 0.65–1.3)
PROTHROM AB SERPL-ACNC: 17.6 SEC — HIGH (ref 9.95–12.87)

## 2018-07-27 RX ORDER — WARFARIN SODIUM 2.5 MG/1
7 TABLET ORAL AT BEDTIME
Qty: 0 | Refills: 0 | Status: DISCONTINUED | OUTPATIENT
Start: 2018-07-27 | End: 2018-07-27

## 2018-07-27 RX ORDER — ENOXAPARIN SODIUM 100 MG/ML
100 INJECTION SUBCUTANEOUS EVERY 12 HOURS
Qty: 0 | Refills: 0 | Status: DISCONTINUED | OUTPATIENT
Start: 2018-07-27 | End: 2018-07-27

## 2018-07-27 RX ORDER — METOPROLOL TARTRATE 50 MG
1 TABLET ORAL
Qty: 0 | Refills: 0 | COMMUNITY

## 2018-07-27 RX ORDER — LOSARTAN POTASSIUM 100 MG/1
1 TABLET, FILM COATED ORAL
Qty: 30 | Refills: 0
Start: 2018-07-27 | End: 2018-08-25

## 2018-07-27 RX ORDER — METOPROLOL TARTRATE 50 MG
12.5 TABLET ORAL
Qty: 60 | Refills: 0 | OUTPATIENT
Start: 2018-07-27 | End: 2018-08-25

## 2018-07-27 RX ORDER — ENOXAPARIN SODIUM 100 MG/ML
100 INJECTION SUBCUTANEOUS
Qty: 1000 | Refills: 0 | OUTPATIENT
Start: 2018-07-27 | End: 2018-07-31

## 2018-07-27 RX ORDER — FUROSEMIDE 40 MG
40 TABLET ORAL DAILY
Qty: 0 | Refills: 0 | Status: DISCONTINUED | OUTPATIENT
Start: 2018-07-27 | End: 2018-07-27

## 2018-07-27 RX ORDER — ENOXAPARIN SODIUM 100 MG/ML
100 INJECTION SUBCUTANEOUS
Qty: 6000 | Refills: 0 | OUTPATIENT
Start: 2018-07-27 | End: 2018-08-25

## 2018-07-27 RX ORDER — METOPROLOL TARTRATE 50 MG
1 TABLET ORAL
Qty: 30 | Refills: 0
Start: 2018-07-27 | End: 2018-08-25

## 2018-07-27 RX ADMIN — Medication 12.5 MILLIGRAM(S): at 05:52

## 2018-07-27 RX ADMIN — LOSARTAN POTASSIUM 50 MILLIGRAM(S): 100 TABLET, FILM COATED ORAL at 05:52

## 2018-07-27 RX ADMIN — Medication 40 MILLIGRAM(S): at 05:52

## 2018-07-27 RX ADMIN — Medication 12.5 MILLIGRAM(S): at 11:28

## 2018-07-27 RX ADMIN — ENOXAPARIN SODIUM 100 MILLIGRAM(S): 100 INJECTION SUBCUTANEOUS at 09:47

## 2018-07-27 RX ADMIN — Medication 81 MILLIGRAM(S): at 11:27

## 2018-07-27 NOTE — PROGRESS NOTE ADULT - SUBJECTIVE AND OBJECTIVE BOX
OVER NIGHT EVENTS:  No significant events overnight    PHYSICAL EXAM    ICU Vital Signs Last 24 Hrs  T(C): 36.2 (27 Jul 2018 07:07), Max: 36.5 (26 Jul 2018 13:38)  T(F): 97.1 (27 Jul 2018 07:07), Max: 97.7 (26 Jul 2018 13:38)  HR: 93 (27 Jul 2018 07:07) (93 - 108)  BP: 97/67 (27 Jul 2018 07:07) (97/67 - 117/76)  BP(mean): 78 (27 Jul 2018 07:07) (78 - 90)  RR: 17 (27 Jul 2018 07:07) (17 - 18)  SpO2: --    I&O's Detail    26 Jul 2018 07:01  -  27 Jul 2018 07:00  --------------------------------------------------------  IN:    Oral Fluid: 1020 mL  Total IN: 1020 mL    OUT:    Voided: 2275 mL  Total OUT: 2275 mL    Total NET: -1255 mL      General: Comfortable in bed  HEENT:  On RA  Lymph node: No palpable LN             Lungs: CTA  Cardiovascular: RRR, S1S2  Abdomen: BS+ve; soft non tender  Extremities: No LE edema  Neurological:  AAOx3; No focal deficit      LABS:    PT/INR - ( 27 Jul 2018 05:29 )   PT: 17.60 sec;   INR: 1.61 ratio         PTT - ( 27 Jul 2018 05:29 )  PTT:34.4 sec                                           MEDICATIONS  (STANDING):  aspirin enteric coated 81 milliGRAM(s) Oral daily  atorvastatin 40 milliGRAM(s) Oral at bedtime  enoxaparin Injectable 100 milliGRAM(s) SubCutaneous every 12 hours  furosemide    Tablet 40 milliGRAM(s) Oral daily  losartan 50 milliGRAM(s) Oral daily  metoprolol tartrate 12.5 milliGRAM(s) Oral four times a day  warfarin 7 milliGRAM(s) Oral at bedtime    MEDICATIONS  (PRN):      Radiology:

## 2018-07-27 NOTE — PROGRESS NOTE ADULT - ATTENDING COMMENTS
Attending Statement: I have personally performed a face to face diagnostic evaluation on this patient. I have reviewed the above note and agree with the history, exam and plan of care, except as I have noted in the text.
Patient seen and evaluated independently medical resident note reviewed, I agree with plan and management, except as I have noted.
Attending Statement: I have personally performed a face to face diagnostic evaluation on this patient. I have reviewed the above note and agree with the history, exam and plan of care, except as I have noted in the text.
Patient seen and evaluated independently medical resident note reviewed, I agree with plan and management, except as I have noted.
Patient seen and evaluated independently medical resident note reviewed, I agree with plan and management, except as I have noted.

## 2018-07-27 NOTE — PROGRESS NOTE ADULT - ASSESSMENT
Appreciate  GARRET . Mechanical valves good. Patient has life vest. He does not want entresto now. On arb. Adjust beta as outpatient.  Lovenox if INR not therapeutic. F/U NP one week. Cardiac rehab if patient agrees.   Repeat echo in 3 mos.

## 2018-07-27 NOTE — PROGRESS NOTE ADULT - SUBJECTIVE AND OBJECTIVE BOX
SUBJECTIVE:    Patient is a 42y old Male who presents with a chief complaint of Dyspnea on exertion x1 week (25 Jul 2018 14:15)    Currently admitted to medicine with the primary diagnosis of Acute systolic congestive heart failure     Today is hospital day 6d. This morning he is resting comfortably in bed and reports no new issues or overnight events.     PAST MEDICAL & SURGICAL HISTORY  Mitral valve replaced: metallic valve  Aortic valve replaced: metallic valve  Infective endocarditis, due to unspecified organism, unspecified chronicity  H/O hand surgery  H/O hernia repair  H/O mitral valve replacement  Aortic valve replaced    SOCIAL HISTORY:  Negative for smoking/alcohol/drug use.     ALLERGIES:  penicillin (Unknown)    MEDICATIONS:  STANDING MEDICATIONS  aspirin enteric coated 81 milliGRAM(s) Oral daily  atorvastatin 40 milliGRAM(s) Oral at bedtime  enoxaparin Injectable 100 milliGRAM(s) SubCutaneous every 12 hours  furosemide    Tablet 40 milliGRAM(s) Oral two times a day  losartan 50 milliGRAM(s) Oral daily  metoprolol tartrate 12.5 milliGRAM(s) Oral four times a day    PRN MEDICATIONS    VITALS:   T(F): 97.1  HR: 95  BP: 100/64  RR: 17  SpO2: --    LABS:          PT/INR - ( 27 Jul 2018 05:29 )   PT: 17.60 sec;   INR: 1.61 ratio         PTT - ( 27 Jul 2018 05:29 )  PTT:34.4 sec        RADIOLOGY:  < from: Xray Chest 1 View- PORTABLE-Routine (07.25.18 @ 05:08) >  Impression:      No radiographic evidence of acute cardiopulmonary disease.    < end of copied text >      07-26-18 @ 07:01  -  07-27-18 @ 07:00  --------------------------------------------------------  IN: 1020 mL / OUT: 2275 mL / NET: -1255 mL        PHYSICAL EXAM:  GEN: No acute distress  LUNGS: Clear to auscultation bilaterally   HEART: S1/S2 present. tachycardic at 104  ABD: Soft, non-tender, non-distended. Bowel sounds present  EXT: NC/NC/NE/2+PP/OSUNA/Skin Intact.   NEURO: AAOX3

## 2018-07-27 NOTE — PROGRESS NOTE ADULT - ASSESSMENT
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     RATLIFF/orthopnea/chest tightness 2/2 acute systolic CHF   -Admit to unit  -c/w lasix 40mg q24h   -cxr has cleared up   -BNP is 4508  -CE 0.04-->0.05-->0.04  -2 d echo shows EF 20-29%. no regional wall abnormalities. L atrium is moderately dilated. Mod to severe aortic regurg. mild tricuspid regurg    -GARRET showed EF 20% and trace paraaortic regurg with functioning valves  -Cardiology following  -c/w metoprolol 12.5 q6h. Entresto switched to losartan since the co pay is too high.    - lipids wnl, tsh 1.71 and HbA1c is 5.5  -venous duplex shows no DVT    Mechanical valves   -Keep INR between 2.5-3.5  -bridge with lovenox for now    Mechanical mitral and aortic valve  -c/w coumadin  -keep INR between 2.5-3.5    CKD baseline unavailable  -monitor bmp as could be 2/2 edema  -Creatinine is improved at 1.3    DVT ppx  -on coumadin with therapeutic INR    GI ppx  -c/w protonix 42Y M with pmh of infective endocarditis which developed after tooth infection s/p mechanical mitral and aortic valve replacement about a year ago presented with dyspnea on exertion and chest tightness for 1 week.     RATLIFF/orthopnea/chest tightness 2/2 acute systolic CHF   -Admit to unit  -c/w lasix 40mg q24h   -cxr has cleared up   -BNP is 4508  -CE 0.04-->0.05-->0.04  -2 d echo shows EF 20-29%. no regional wall abnormalities. L atrium is moderately dilated. Mod to severe aortic regurg. mild tricuspid regurg    -GARRET showed EF 20% and trace paraaortic regurg with functioning valves  -Cardiology following  -c/w metoprolol 12.5 q6h. Entresto switched to losartan since the co pay is too high.    - lipids wnl, tsh 1.71 and HbA1c is 5.5  -venous duplex shows no DVT    Mechanical valves   -Keep INR between 2.5-3.5  -bridge with lovenox for now    Mechanical mitral and aortic valve  -c/w coumadin  -keep INR between 2.5-3.5    CKD baseline unavailable  -monitor bmp as could be 2/2 edema  -Creatinine is improved at 1.3    DVT ppx  -on coumadin with sub therapeutic INR    GI ppx  -c/w protonix

## 2018-07-27 NOTE — PROGRESS NOTE ADULT - ASSESSMENT
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     RATLIFF/orthopnea/chest tightness 2/2 acute systolic CHF   -c/w lasix 40mg q24h   -cxr has cleared up   -CE are negative   -GARRET showed EF 20% and trace para-aortic regurg with well functioning valves  -change metoprolol 12.5 q6h to Toprol xl 50mg daily. Entresto switched to losartan since the co pay is too high.    - lipids wnl, tsh 1.71 and HbA1c is 5.  - out patient cardiology follow up  -life vest and repeat echo in 2-3 months  -may dc home today 33 min spent on dc    Mechanical valves   -Keep INR between 2.5-3.5  -bridge with lovenox for now  -I spoke with his father joni who is a RN who is agreeable to administer Lovenox 100mg sc q12h until coumadin level is therapeutic.      Mechanical mitral and aortic valve  -c/w coumadin  -keep INR between 2.5-3.5

## 2018-07-27 NOTE — PROGRESS NOTE ADULT - SUBJECTIVE AND OBJECTIVE BOX
Patient is a 42y old  Male who lying flat . No complaints      T(F): 97.1 (07-27-18 @ 07:07), Max: 97.7 (07-26-18 @ 13:38)  HR: 93 (07-27-18 @ 07:07)  BP: 97/67 (07-27-18 @ 07:07)  RR: 17 (07-27-18 @ 07:07)  SpO2: --    PHYSICAL EXAM:  GENERAL: NAD, well-groomed, well-developed  HEAD:  Atraumatic, Normocephalic  EYES: EOMI, PERRLA, conjunctiva and sclera clear  ENMT: No tonsillar erythema, exudates, or enlargement; Moist mucous membranes, Good dentition, No lesions  NECK: Supple, No JVD, Normal thyroid  NERVOUS SYSTEM:  Alert & Oriented X3,  Motor Strength 5/5 B/L upper and lower extremities  CHEST/LUNG: Clear to percussion bilaterally; No rales, rhonchi, wheezing, or rubs  HEART: Regular rate and rhythm; No murmurs, rubs, or gallops. + click  ABDOMEN: Soft, Nontender, Nondistended; Bowel sounds present  EXTREMITIES:   No clubbing, cyanosis, or edema  LYMPH: No lymphadenopathy noted  SKIN: No rashes or lesions    labs            PT/INR - ( 27 Jul 2018 05:29 )   PT: 17.60 sec;   INR: 1.61 ratio         PTT - ( 27 Jul 2018 05:29 )  PTT:34.4 sec        aspirin enteric coated 81 milliGRAM(s) Oral daily  atorvastatin 40 milliGRAM(s) Oral at bedtime  enoxaparin Injectable 100 milliGRAM(s) SubCutaneous every 12 hours  furosemide    Tablet 40 milliGRAM(s) Oral daily  losartan 50 milliGRAM(s) Oral daily  metoprolol tartrate 12.5 milliGRAM(s) Oral four times a day

## 2018-07-27 NOTE — PROGRESS NOTE ADULT - PROVIDER SPECIALTY LIST ADULT
Cardiology
Hospitalist
Hospitalist
MICU
MICU
Pulmonology
MICU
MICU

## 2018-07-27 NOTE — PROGRESS NOTE ADULT - ASSESSMENT
Impression:  SOB secondary to pulmonary edema  HO valvular heart disease s/p AVR/MVR  HFrEF   Exsmoker  CKD    Recommendations:  - Keep I<O; On Lasix PO  - CARDIO FU   - COUMADIN F/U INR and adjust to target 2.5-3.5; Bridge with lovenox;   - Ambulate   - NEEDS NPSG OP  - DVT/ GI PROPHYLAXIS  - Plan as per cardio  - Recall PRN

## 2018-07-27 NOTE — PROGRESS NOTE ADULT - SUBJECTIVE AND OBJECTIVE BOX
Patient is comfortable in bed no sob no pain      T(F): 97.1 (07-27-18 @ 07:07), Max: 97.7 (07-26-18 @ 13:38)  HR: 93 (07-27-18 @ 07:07)  BP: 97/67 (07-27-18 @ 07:07)  RR: 17 (07-27-18 @ 07:07)    PHYSICAL EXAM:  GENERAL: NAD  HEAD:  Atraumatic, Normocephalic  EYES: EOMI, PERRLA, conjunctiva and sclera clear  ENMT: No tonsillar erythema, exudates, or enlargement; Moist mucous membranes, Good dentition, No lesions  NECK: Supple, No JVD, Normal thyroid  NERVOUS SYSTEM:  Alert & Oriented X3, no focal deficits  CHEST/LUNG: Clear to percussion bilaterally; No rales, rhonchi, wheezing, or rubs  HEART: Regular rate and rhythm; No murmurs, rubs, or gallops  ABDOMEN: Soft, Nontender, Nondistended; Bowel sounds present  EXTREMITIES:  2+ Peripheral Pulses, No clubbing, cyanosis, or edema  LYMPH: No lymphadenopathy noted  SKIN: No rashes or lesions      PT/INR - ( 27 Jul 2018 05:29 )   PT: 17.60 sec;   INR: 1.61 ratio         PTT - ( 27 Jul 2018 05:29 )  PTT:34.4 sec    RADIOLOGY  < from: Xray Chest 1 View- PORTABLE-Routine (07.25.18 @ 05:08) >  Impression:      No radiographic evidence of acute cardiopulmonary disease.        MEDICATIONS  (STANDING):  aspirin enteric coated 81 milliGRAM(s) Oral daily  atorvastatin 40 milliGRAM(s) Oral at bedtime  enoxaparin Injectable 100 milliGRAM(s) SubCutaneous every 12 hours  furosemide    Tablet 40 milliGRAM(s) Oral daily  losartan 50 milliGRAM(s) Oral daily  metoprolol tartrate 12.5 milliGRAM(s) Oral four times a day  warfarin 7 milliGRAM(s) Oral at bedtime    MEDICATIONS  (PRN):

## 2018-07-30 ENCOUNTER — OUTPATIENT (OUTPATIENT)
Dept: OUTPATIENT SERVICES | Facility: HOSPITAL | Age: 43
LOS: 1 days | Discharge: HOME | End: 2018-07-30

## 2018-07-30 DIAGNOSIS — Z95.2 PRESENCE OF PROSTHETIC HEART VALVE: ICD-10-CM

## 2018-07-30 DIAGNOSIS — Z95.2 PRESENCE OF PROSTHETIC HEART VALVE: Chronic | ICD-10-CM

## 2018-07-30 DIAGNOSIS — Z98.890 OTHER SPECIFIED POSTPROCEDURAL STATES: Chronic | ICD-10-CM

## 2018-07-30 DIAGNOSIS — Z79.01 LONG TERM (CURRENT) USE OF ANTICOAGULANTS: ICD-10-CM

## 2018-07-30 PROBLEM — I33.0 ACUTE AND SUBACUTE INFECTIVE ENDOCARDITIS: Chronic | Status: ACTIVE | Noted: 2018-07-22

## 2018-08-01 ENCOUNTER — OUTPATIENT (OUTPATIENT)
Dept: OUTPATIENT SERVICES | Facility: HOSPITAL | Age: 43
LOS: 1 days | Discharge: HOME | End: 2018-08-01

## 2018-08-01 DIAGNOSIS — I08.2 RHEUMATIC DISORDERS OF BOTH AORTIC AND TRICUSPID VALVES: ICD-10-CM

## 2018-08-01 DIAGNOSIS — Z87.891 PERSONAL HISTORY OF NICOTINE DEPENDENCE: ICD-10-CM

## 2018-08-01 DIAGNOSIS — Z98.890 OTHER SPECIFIED POSTPROCEDURAL STATES: Chronic | ICD-10-CM

## 2018-08-01 DIAGNOSIS — I42.8 OTHER CARDIOMYOPATHIES: ICD-10-CM

## 2018-08-01 DIAGNOSIS — Z95.2 PRESENCE OF PROSTHETIC HEART VALVE: ICD-10-CM

## 2018-08-01 DIAGNOSIS — Z88.0 ALLERGY STATUS TO PENICILLIN: ICD-10-CM

## 2018-08-01 DIAGNOSIS — Z79.01 LONG TERM (CURRENT) USE OF ANTICOAGULANTS: ICD-10-CM

## 2018-08-01 DIAGNOSIS — I50.21 ACUTE SYSTOLIC (CONGESTIVE) HEART FAILURE: ICD-10-CM

## 2018-08-01 DIAGNOSIS — N18.9 CHRONIC KIDNEY DISEASE, UNSPECIFIED: ICD-10-CM

## 2018-08-01 DIAGNOSIS — Z95.2 PRESENCE OF PROSTHETIC HEART VALVE: Chronic | ICD-10-CM

## 2018-08-06 ENCOUNTER — OUTPATIENT (OUTPATIENT)
Dept: OUTPATIENT SERVICES | Facility: HOSPITAL | Age: 43
LOS: 1 days | Discharge: HOME | End: 2018-08-06

## 2018-08-06 DIAGNOSIS — Z95.2 PRESENCE OF PROSTHETIC HEART VALVE: Chronic | ICD-10-CM

## 2018-08-06 DIAGNOSIS — Z98.890 OTHER SPECIFIED POSTPROCEDURAL STATES: Chronic | ICD-10-CM

## 2018-08-06 DIAGNOSIS — Z79.01 LONG TERM (CURRENT) USE OF ANTICOAGULANTS: ICD-10-CM

## 2018-08-06 DIAGNOSIS — Z95.2 PRESENCE OF PROSTHETIC HEART VALVE: ICD-10-CM

## 2018-08-13 ENCOUNTER — OUTPATIENT (OUTPATIENT)
Dept: OUTPATIENT SERVICES | Facility: HOSPITAL | Age: 43
LOS: 1 days | Discharge: HOME | End: 2018-08-13

## 2018-08-13 DIAGNOSIS — Z95.2 PRESENCE OF PROSTHETIC HEART VALVE: Chronic | ICD-10-CM

## 2018-08-13 DIAGNOSIS — Z79.01 LONG TERM (CURRENT) USE OF ANTICOAGULANTS: ICD-10-CM

## 2018-08-13 DIAGNOSIS — Z98.890 OTHER SPECIFIED POSTPROCEDURAL STATES: Chronic | ICD-10-CM

## 2018-08-13 DIAGNOSIS — Z95.2 PRESENCE OF PROSTHETIC HEART VALVE: ICD-10-CM

## 2018-08-20 ENCOUNTER — OUTPATIENT (OUTPATIENT)
Dept: OUTPATIENT SERVICES | Facility: HOSPITAL | Age: 43
LOS: 1 days | Discharge: HOME | End: 2018-08-20

## 2018-08-20 DIAGNOSIS — Z95.2 PRESENCE OF PROSTHETIC HEART VALVE: ICD-10-CM

## 2018-08-20 DIAGNOSIS — Z98.890 OTHER SPECIFIED POSTPROCEDURAL STATES: Chronic | ICD-10-CM

## 2018-08-20 DIAGNOSIS — Z95.2 PRESENCE OF PROSTHETIC HEART VALVE: Chronic | ICD-10-CM

## 2018-08-20 DIAGNOSIS — Z79.01 LONG TERM (CURRENT) USE OF ANTICOAGULANTS: ICD-10-CM

## 2018-08-27 ENCOUNTER — OUTPATIENT (OUTPATIENT)
Dept: OUTPATIENT SERVICES | Facility: HOSPITAL | Age: 43
LOS: 1 days | Discharge: HOME | End: 2018-08-27

## 2018-08-27 DIAGNOSIS — Z95.2 PRESENCE OF PROSTHETIC HEART VALVE: ICD-10-CM

## 2018-08-27 DIAGNOSIS — Z95.2 PRESENCE OF PROSTHETIC HEART VALVE: Chronic | ICD-10-CM

## 2018-08-27 DIAGNOSIS — Z98.890 OTHER SPECIFIED POSTPROCEDURAL STATES: Chronic | ICD-10-CM

## 2018-08-27 DIAGNOSIS — Z79.01 LONG TERM (CURRENT) USE OF ANTICOAGULANTS: ICD-10-CM

## 2018-08-27 LAB
POCT INR: 2.9 RATIO — HIGH (ref 0.9–1.2)
POCT PT: 35.4 SEC — HIGH (ref 10–13.4)

## 2018-09-04 ENCOUNTER — OUTPATIENT (OUTPATIENT)
Dept: OUTPATIENT SERVICES | Facility: HOSPITAL | Age: 43
LOS: 1 days | Discharge: HOME | End: 2018-09-04

## 2018-09-04 DIAGNOSIS — Z98.890 OTHER SPECIFIED POSTPROCEDURAL STATES: Chronic | ICD-10-CM

## 2018-09-04 DIAGNOSIS — Z79.01 LONG TERM (CURRENT) USE OF ANTICOAGULANTS: ICD-10-CM

## 2018-09-04 DIAGNOSIS — Z95.2 PRESENCE OF PROSTHETIC HEART VALVE: Chronic | ICD-10-CM

## 2018-09-04 DIAGNOSIS — Z95.2 PRESENCE OF PROSTHETIC HEART VALVE: ICD-10-CM

## 2018-09-04 LAB
POCT INR: 3.3 RATIO — HIGH (ref 0.9–1.2)
POCT PT: 39.3 SEC — HIGH (ref 10–13.4)

## 2018-09-14 ENCOUNTER — OUTPATIENT (OUTPATIENT)
Dept: OUTPATIENT SERVICES | Facility: HOSPITAL | Age: 43
LOS: 1 days | Discharge: HOME | End: 2018-09-14

## 2018-09-14 DIAGNOSIS — Z95.2 PRESENCE OF PROSTHETIC HEART VALVE: Chronic | ICD-10-CM

## 2018-09-14 DIAGNOSIS — Z95.2 PRESENCE OF PROSTHETIC HEART VALVE: ICD-10-CM

## 2018-09-14 DIAGNOSIS — Z98.890 OTHER SPECIFIED POSTPROCEDURAL STATES: Chronic | ICD-10-CM

## 2018-09-14 DIAGNOSIS — Z79.01 LONG TERM (CURRENT) USE OF ANTICOAGULANTS: ICD-10-CM

## 2018-09-14 LAB
POCT INR: 3.7 RATIO — HIGH (ref 0.9–1.2)
POCT PT: 44.5 SEC — HIGH (ref 10–13.4)

## 2018-09-28 ENCOUNTER — OUTPATIENT (OUTPATIENT)
Dept: OUTPATIENT SERVICES | Facility: HOSPITAL | Age: 43
LOS: 1 days | Discharge: HOME | End: 2018-09-28

## 2018-09-28 DIAGNOSIS — Z95.2 PRESENCE OF PROSTHETIC HEART VALVE: Chronic | ICD-10-CM

## 2018-09-28 DIAGNOSIS — Z79.01 LONG TERM (CURRENT) USE OF ANTICOAGULANTS: ICD-10-CM

## 2018-09-28 DIAGNOSIS — Z98.890 OTHER SPECIFIED POSTPROCEDURAL STATES: Chronic | ICD-10-CM

## 2018-09-28 DIAGNOSIS — Z95.2 PRESENCE OF PROSTHETIC HEART VALVE: ICD-10-CM

## 2018-09-28 LAB
POCT INR: 3.2 RATIO — HIGH (ref 0.9–1.2)
POCT PT: 38.2 SEC — HIGH (ref 10–13.4)

## 2018-10-12 ENCOUNTER — OUTPATIENT (OUTPATIENT)
Dept: OUTPATIENT SERVICES | Facility: HOSPITAL | Age: 43
LOS: 1 days | Discharge: HOME | End: 2018-10-12

## 2018-10-12 DIAGNOSIS — Z95.2 PRESENCE OF PROSTHETIC HEART VALVE: Chronic | ICD-10-CM

## 2018-10-12 DIAGNOSIS — Z95.2 PRESENCE OF PROSTHETIC HEART VALVE: ICD-10-CM

## 2018-10-12 DIAGNOSIS — Z79.01 LONG TERM (CURRENT) USE OF ANTICOAGULANTS: ICD-10-CM

## 2018-10-12 DIAGNOSIS — Z98.890 OTHER SPECIFIED POSTPROCEDURAL STATES: Chronic | ICD-10-CM

## 2018-10-12 LAB
POCT INR: 1.6 RATIO — HIGH (ref 0.9–1.2)
POCT PT: 19.3 SEC — HIGH (ref 10–13.4)

## 2018-10-15 ENCOUNTER — OUTPATIENT (OUTPATIENT)
Dept: OUTPATIENT SERVICES | Facility: HOSPITAL | Age: 43
LOS: 1 days | Discharge: HOME | End: 2018-10-15

## 2018-10-15 DIAGNOSIS — Z98.890 OTHER SPECIFIED POSTPROCEDURAL STATES: Chronic | ICD-10-CM

## 2018-10-15 DIAGNOSIS — Z79.01 LONG TERM (CURRENT) USE OF ANTICOAGULANTS: ICD-10-CM

## 2018-10-15 DIAGNOSIS — Z95.2 PRESENCE OF PROSTHETIC HEART VALVE: Chronic | ICD-10-CM

## 2018-10-15 DIAGNOSIS — Z95.2 PRESENCE OF PROSTHETIC HEART VALVE: ICD-10-CM

## 2018-10-15 LAB
POCT INR: 3.5 RATIO — HIGH (ref 0.9–1.2)
POCT PT: 40.1 SEC — HIGH (ref 10–13.4)

## 2018-10-22 ENCOUNTER — OUTPATIENT (OUTPATIENT)
Dept: OUTPATIENT SERVICES | Facility: HOSPITAL | Age: 43
LOS: 1 days | Discharge: HOME | End: 2018-10-22

## 2018-10-22 DIAGNOSIS — Z95.2 PRESENCE OF PROSTHETIC HEART VALVE: ICD-10-CM

## 2018-10-22 DIAGNOSIS — Z95.2 PRESENCE OF PROSTHETIC HEART VALVE: Chronic | ICD-10-CM

## 2018-10-22 DIAGNOSIS — Z98.890 OTHER SPECIFIED POSTPROCEDURAL STATES: Chronic | ICD-10-CM

## 2018-10-22 DIAGNOSIS — Z79.01 LONG TERM (CURRENT) USE OF ANTICOAGULANTS: ICD-10-CM

## 2018-10-22 LAB
POCT INR: 3.4 RATIO — HIGH (ref 0.9–1.2)
POCT PT: 40.4 SEC — HIGH (ref 10–13.4)

## 2018-10-31 ENCOUNTER — APPOINTMENT (OUTPATIENT)
Dept: CARDIOLOGY | Facility: CLINIC | Age: 43
End: 2018-10-31

## 2018-10-31 VITALS
WEIGHT: 225 LBS | BODY MASS INDEX: 36.16 KG/M2 | HEIGHT: 66 IN | HEART RATE: 77 BPM | SYSTOLIC BLOOD PRESSURE: 120 MMHG | DIASTOLIC BLOOD PRESSURE: 83 MMHG | OXYGEN SATURATION: 93 %

## 2018-10-31 DIAGNOSIS — Z87.891 PERSONAL HISTORY OF NICOTINE DEPENDENCE: ICD-10-CM

## 2018-10-31 DIAGNOSIS — Z78.9 OTHER SPECIFIED HEALTH STATUS: ICD-10-CM

## 2018-11-05 ENCOUNTER — OUTPATIENT (OUTPATIENT)
Dept: OUTPATIENT SERVICES | Facility: HOSPITAL | Age: 43
LOS: 1 days | Discharge: HOME | End: 2018-11-05

## 2018-11-05 DIAGNOSIS — Z79.01 LONG TERM (CURRENT) USE OF ANTICOAGULANTS: ICD-10-CM

## 2018-11-05 DIAGNOSIS — Z95.2 PRESENCE OF PROSTHETIC HEART VALVE: ICD-10-CM

## 2018-11-05 DIAGNOSIS — Z95.2 PRESENCE OF PROSTHETIC HEART VALVE: Chronic | ICD-10-CM

## 2018-11-05 DIAGNOSIS — Z98.890 OTHER SPECIFIED POSTPROCEDURAL STATES: Chronic | ICD-10-CM

## 2018-11-05 LAB
POCT INR: 2.9 RATIO — HIGH (ref 0.9–1.2)
POCT PT: 35.4 SEC — HIGH (ref 10–13.4)

## 2018-11-14 ENCOUNTER — OUTPATIENT (OUTPATIENT)
Dept: OUTPATIENT SERVICES | Facility: HOSPITAL | Age: 43
LOS: 1 days | Discharge: HOME | End: 2018-11-14

## 2018-11-14 DIAGNOSIS — B97.89 OTHER VIRAL AGENTS AS THE CAUSE OF DISEASES CLASSIFIED ELSEWHERE: ICD-10-CM

## 2018-11-14 DIAGNOSIS — Z95.2 PRESENCE OF PROSTHETIC HEART VALVE: Chronic | ICD-10-CM

## 2018-11-14 DIAGNOSIS — Z98.890 OTHER SPECIFIED POSTPROCEDURAL STATES: Chronic | ICD-10-CM

## 2018-11-19 ENCOUNTER — OUTPATIENT (OUTPATIENT)
Dept: OUTPATIENT SERVICES | Facility: HOSPITAL | Age: 43
LOS: 1 days | Discharge: HOME | End: 2018-11-19

## 2018-11-19 DIAGNOSIS — Z95.2 PRESENCE OF PROSTHETIC HEART VALVE: Chronic | ICD-10-CM

## 2018-11-19 DIAGNOSIS — Z98.890 OTHER SPECIFIED POSTPROCEDURAL STATES: Chronic | ICD-10-CM

## 2018-11-19 DIAGNOSIS — Z95.2 PRESENCE OF PROSTHETIC HEART VALVE: ICD-10-CM

## 2018-11-19 DIAGNOSIS — Z79.01 LONG TERM (CURRENT) USE OF ANTICOAGULANTS: ICD-10-CM

## 2018-11-19 LAB
POCT INR: 3.1 RATIO — HIGH (ref 0.9–1.2)
POCT PT: 36.7 SEC — HIGH (ref 10–13.4)

## 2018-11-26 ENCOUNTER — OUTPATIENT (OUTPATIENT)
Dept: OUTPATIENT SERVICES | Facility: HOSPITAL | Age: 43
LOS: 1 days | Discharge: HOME | End: 2018-11-26

## 2018-11-26 DIAGNOSIS — Z95.2 PRESENCE OF PROSTHETIC HEART VALVE: ICD-10-CM

## 2018-11-26 DIAGNOSIS — Z95.2 PRESENCE OF PROSTHETIC HEART VALVE: Chronic | ICD-10-CM

## 2018-11-26 DIAGNOSIS — Z79.01 LONG TERM (CURRENT) USE OF ANTICOAGULANTS: ICD-10-CM

## 2018-11-26 DIAGNOSIS — Z98.890 OTHER SPECIFIED POSTPROCEDURAL STATES: Chronic | ICD-10-CM

## 2018-11-26 LAB
POCT INR: 2.7 RATIO — HIGH (ref 0.9–1.2)
POCT PT: 31.9 SEC — HIGH (ref 10–13.4)

## 2018-11-27 ENCOUNTER — OUTPATIENT (OUTPATIENT)
Dept: OUTPATIENT SERVICES | Facility: HOSPITAL | Age: 43
LOS: 1 days | Discharge: HOME | End: 2018-11-27

## 2018-11-27 DIAGNOSIS — Z98.890 OTHER SPECIFIED POSTPROCEDURAL STATES: Chronic | ICD-10-CM

## 2018-11-27 DIAGNOSIS — Z95.2 PRESENCE OF PROSTHETIC HEART VALVE: Chronic | ICD-10-CM

## 2018-11-28 DIAGNOSIS — B97.89 OTHER VIRAL AGENTS AS THE CAUSE OF DISEASES CLASSIFIED ELSEWHERE: ICD-10-CM

## 2018-12-10 ENCOUNTER — OUTPATIENT (OUTPATIENT)
Dept: OUTPATIENT SERVICES | Facility: HOSPITAL | Age: 43
LOS: 1 days | Discharge: HOME | End: 2018-12-10

## 2018-12-10 DIAGNOSIS — Z95.2 PRESENCE OF PROSTHETIC HEART VALVE: Chronic | ICD-10-CM

## 2018-12-10 DIAGNOSIS — Z95.2 PRESENCE OF PROSTHETIC HEART VALVE: ICD-10-CM

## 2018-12-10 DIAGNOSIS — Z98.890 OTHER SPECIFIED POSTPROCEDURAL STATES: Chronic | ICD-10-CM

## 2018-12-10 DIAGNOSIS — Z79.01 LONG TERM (CURRENT) USE OF ANTICOAGULANTS: ICD-10-CM

## 2018-12-10 LAB
POCT INR: 4.3 RATIO — HIGH (ref 0.9–1.2)
POCT PT: 51.6 SEC — HIGH (ref 10–13.4)

## 2018-12-17 ENCOUNTER — OUTPATIENT (OUTPATIENT)
Dept: OUTPATIENT SERVICES | Facility: HOSPITAL | Age: 43
LOS: 1 days | Discharge: HOME | End: 2018-12-17

## 2018-12-17 DIAGNOSIS — Z79.01 LONG TERM (CURRENT) USE OF ANTICOAGULANTS: ICD-10-CM

## 2018-12-17 DIAGNOSIS — Z98.890 OTHER SPECIFIED POSTPROCEDURAL STATES: Chronic | ICD-10-CM

## 2018-12-17 DIAGNOSIS — Z95.2 PRESENCE OF PROSTHETIC HEART VALVE: ICD-10-CM

## 2018-12-17 DIAGNOSIS — Z95.2 PRESENCE OF PROSTHETIC HEART VALVE: Chronic | ICD-10-CM

## 2018-12-17 LAB
POCT INR: 2.5 RATIO — HIGH (ref 0.9–1.2)
POCT PT: 30.5 SEC — HIGH (ref 10–13.4)

## 2018-12-19 ENCOUNTER — APPOINTMENT (OUTPATIENT)
Dept: CARDIOLOGY | Facility: CLINIC | Age: 43
End: 2018-12-19

## 2018-12-19 VITALS
BODY MASS INDEX: 35.51 KG/M2 | DIASTOLIC BLOOD PRESSURE: 74 MMHG | HEART RATE: 92 BPM | WEIGHT: 220 LBS | OXYGEN SATURATION: 98 % | SYSTOLIC BLOOD PRESSURE: 110 MMHG

## 2018-12-19 DIAGNOSIS — Z00.00 ENCOUNTER FOR GENERAL ADULT MEDICAL EXAMINATION W/OUT ABNORMAL FINDINGS: ICD-10-CM

## 2018-12-19 NOTE — PHYSICAL EXAM
[General Appearance - Well Developed] : well developed [Normal Appearance] : normal appearance [Well Groomed] : well groomed [General Appearance - Well Nourished] : well nourished [No Deformities] : no deformities [General Appearance - In No Acute Distress] : no acute distress [Normal Conjunctiva] : the conjunctiva exhibited no abnormalities [No Oral Cyanosis] : no oral cyanosis [Respiration, Rhythm And Depth] : normal respiratory rhythm and effort [Exaggerated Use Of Accessory Muscles For Inspiration] : no accessory muscle use [Auscultation Breath Sounds / Voice Sounds] : lungs were clear to auscultation bilaterally [Heart Rate And Rhythm] : heart rate and rhythm were normal [Heart Sounds] : normal S1 and S2 [Murmurs] : no murmurs present [Edema] : no peripheral edema present [Bowel Sounds] : normal bowel sounds [Abdomen Soft] : soft [Abdomen Tenderness] : non-tender [Abnormal Walk] : normal gait [Nail Clubbing] : no clubbing of the fingernails [Cyanosis, Localized] : no localized cyanosis [Skin Color & Pigmentation] : normal skin color and pigmentation [] : no rash [Oriented To Time, Place, And Person] : oriented to person, place, and time [Impaired Insight] : insight and judgment were intact [Affect] : the affect was normal [Mood] : the mood was normal [No Anxiety] : not feeling anxious [FreeTextEntry1] : No JVD, no carotid bruits

## 2018-12-19 NOTE — HISTORY OF PRESENT ILLNESS
[FreeTextEntry1] : Cardiologist: Dr. Saravia \par \par Mr. Wooten is a 41 yo M former  with history of infective endocarditis after tooth infection s/p mechanical aortic and and aortic valve replacement on coumadin (INR 2.5-3.5) in July 2017 at [facility] with an admission to [facility] in July with acute systolic heart failure exacerbation. Patient states that was the first time he was ever informed of cardiomyopathy. He was experiencing heart failure symptoms including dyspnea at rest and PND. He was prescribed Entresto but couldn't afford it due to insurance issues. \par \par Since discharge, he has been taking his meds as prescribed and wearing a LifeVest, no discharges or shocks. No chest pain, dyspnea, dizziness, lightheadedness, presyncope or syncope. No PND or lower extremity edema. Stable 2 pillow orthopnea.\par \par Of note, he had a repeat echo at Dr. Saravia's office yesterday and he is currently wearing his LifeVest.

## 2018-12-19 NOTE — DISCUSSION/SUMMARY
[FreeTextEntry1] : Mr. Wooten has a history of infective endocarditis secondary to a dental infection s/p mechanical aortic and mitral valve (July 2017) and he now presents for evaluation of nonischemic dilated cardiomyopathy. He has been on guideline directed medical therapy and has NYHA Class 2 symptoms. He had a repeat echo in Oct with EF of 25% (no sig valvular disease). \par \par I discussed with Mr. Wooten a single chamber ICD implant for primary prevention. I have explained in detail the procedure, risks, and benefits of a defibrillator including but not limited to bleeding, infection, damage to the heart or lung, stroke, heart attack and death. After my last visit with him, I had him follow up with ID and they have cleared him for an ICD implant. MRSA swabs were negative.  I have answered all questions to the patient's satisfaction. He would like to proceed with ICD after the holidays, so we have scheduled it for Jan. \par \par I have advised the patient to go to the nearest emergency room if he experiences any chest pain, dyspnea, syncope, or has any other compelling symptoms.\par \par Follow up in 2 months.

## 2018-12-28 ENCOUNTER — OUTPATIENT (OUTPATIENT)
Dept: OUTPATIENT SERVICES | Facility: HOSPITAL | Age: 43
LOS: 1 days | Discharge: HOME | End: 2018-12-28

## 2018-12-28 VITALS
WEIGHT: 220.02 LBS | DIASTOLIC BLOOD PRESSURE: 88 MMHG | HEIGHT: 66 IN | RESPIRATION RATE: 18 BRPM | TEMPERATURE: 98 F | SYSTOLIC BLOOD PRESSURE: 127 MMHG | OXYGEN SATURATION: 97 % | HEART RATE: 87 BPM

## 2018-12-28 DIAGNOSIS — Z98.890 OTHER SPECIFIED POSTPROCEDURAL STATES: Chronic | ICD-10-CM

## 2018-12-28 DIAGNOSIS — Z95.2 PRESENCE OF PROSTHETIC HEART VALVE: Chronic | ICD-10-CM

## 2018-12-28 DIAGNOSIS — I42.0 DILATED CARDIOMYOPATHY: ICD-10-CM

## 2018-12-28 DIAGNOSIS — Z01.818 ENCOUNTER FOR OTHER PREPROCEDURAL EXAMINATION: ICD-10-CM

## 2018-12-28 DIAGNOSIS — Z79.01 LONG TERM (CURRENT) USE OF ANTICOAGULANTS: ICD-10-CM

## 2018-12-28 DIAGNOSIS — Z95.2 PRESENCE OF PROSTHETIC HEART VALVE: ICD-10-CM

## 2018-12-28 LAB
ALBUMIN SERPL ELPH-MCNC: 4.3 G/DL — SIGNIFICANT CHANGE UP (ref 3.5–5.2)
ALP SERPL-CCNC: 76 U/L — SIGNIFICANT CHANGE UP (ref 30–115)
ALT FLD-CCNC: 25 U/L — SIGNIFICANT CHANGE UP (ref 0–41)
ANION GAP SERPL CALC-SCNC: 14 MMOL/L — SIGNIFICANT CHANGE UP (ref 7–14)
APPEARANCE UR: CLEAR — SIGNIFICANT CHANGE UP
APTT BLD: 46.2 SEC — HIGH (ref 27–39.2)
AST SERPL-CCNC: 25 U/L — SIGNIFICANT CHANGE UP (ref 0–41)
BASOPHILS # BLD AUTO: 0.07 K/UL — SIGNIFICANT CHANGE UP (ref 0–0.2)
BASOPHILS NFR BLD AUTO: 0.8 % — SIGNIFICANT CHANGE UP (ref 0–1)
BILIRUB SERPL-MCNC: 1.8 MG/DL — HIGH (ref 0.2–1.2)
BILIRUB UR-MCNC: NEGATIVE — SIGNIFICANT CHANGE UP
BUN SERPL-MCNC: 21 MG/DL — HIGH (ref 10–20)
CALCIUM SERPL-MCNC: 9.2 MG/DL — SIGNIFICANT CHANGE UP (ref 8.5–10.1)
CHLORIDE SERPL-SCNC: 99 MMOL/L — SIGNIFICANT CHANGE UP (ref 98–110)
CO2 SERPL-SCNC: 29 MMOL/L — SIGNIFICANT CHANGE UP (ref 17–32)
COLOR SPEC: YELLOW — SIGNIFICANT CHANGE UP
CREAT SERPL-MCNC: 1.1 MG/DL — SIGNIFICANT CHANGE UP (ref 0.7–1.5)
DIFF PNL FLD: NEGATIVE — SIGNIFICANT CHANGE UP
EOSINOPHIL # BLD AUTO: 0.37 K/UL — SIGNIFICANT CHANGE UP (ref 0–0.7)
EOSINOPHIL NFR BLD AUTO: 4 % — SIGNIFICANT CHANGE UP (ref 0–8)
GLUCOSE SERPL-MCNC: 103 MG/DL — HIGH (ref 70–99)
GLUCOSE UR QL: NEGATIVE MG/DL — SIGNIFICANT CHANGE UP
HCT VFR BLD CALC: 47.3 % — SIGNIFICANT CHANGE UP (ref 42–52)
HGB BLD-MCNC: 16.1 G/DL — SIGNIFICANT CHANGE UP (ref 14–18)
IMM GRANULOCYTES NFR BLD AUTO: 0.5 % — HIGH (ref 0.1–0.3)
INR BLD: 1.89 RATIO — HIGH (ref 0.65–1.3)
KETONES UR-MCNC: NEGATIVE — SIGNIFICANT CHANGE UP
LEUKOCYTE ESTERASE UR-ACNC: NEGATIVE — SIGNIFICANT CHANGE UP
LYMPHOCYTES # BLD AUTO: 2.62 K/UL — SIGNIFICANT CHANGE UP (ref 1.2–3.4)
LYMPHOCYTES # BLD AUTO: 28.1 % — SIGNIFICANT CHANGE UP (ref 20.5–51.1)
MCHC RBC-ENTMCNC: 29.8 PG — SIGNIFICANT CHANGE UP (ref 27–31)
MCHC RBC-ENTMCNC: 34 G/DL — SIGNIFICANT CHANGE UP (ref 32–37)
MCV RBC AUTO: 87.6 FL — SIGNIFICANT CHANGE UP (ref 80–94)
MONOCYTES # BLD AUTO: 0.74 K/UL — HIGH (ref 0.1–0.6)
MONOCYTES NFR BLD AUTO: 7.9 % — SIGNIFICANT CHANGE UP (ref 1.7–9.3)
NEUTROPHILS # BLD AUTO: 5.47 K/UL — SIGNIFICANT CHANGE UP (ref 1.4–6.5)
NEUTROPHILS NFR BLD AUTO: 58.7 % — SIGNIFICANT CHANGE UP (ref 42.2–75.2)
NITRITE UR-MCNC: NEGATIVE — SIGNIFICANT CHANGE UP
NRBC # BLD: 0 /100 WBCS — SIGNIFICANT CHANGE UP (ref 0–0)
PH UR: 5.5 — SIGNIFICANT CHANGE UP (ref 5–8)
PLATELET # BLD AUTO: 200 K/UL — SIGNIFICANT CHANGE UP (ref 130–400)
POTASSIUM SERPL-MCNC: 4.3 MMOL/L — SIGNIFICANT CHANGE UP (ref 3.5–5)
POTASSIUM SERPL-SCNC: 4.3 MMOL/L — SIGNIFICANT CHANGE UP (ref 3.5–5)
PROT SERPL-MCNC: 7.8 G/DL — SIGNIFICANT CHANGE UP (ref 6–8)
PROT UR-MCNC: NEGATIVE MG/DL — SIGNIFICANT CHANGE UP
PROTHROM AB SERPL-ACNC: 21.6 SEC — HIGH (ref 9.95–12.87)
RBC # BLD: 5.4 M/UL — SIGNIFICANT CHANGE UP (ref 4.7–6.1)
RBC # FLD: 12.7 % — SIGNIFICANT CHANGE UP (ref 11.5–14.5)
SODIUM SERPL-SCNC: 142 MMOL/L — SIGNIFICANT CHANGE UP (ref 135–146)
SP GR SPEC: 1.02 — SIGNIFICANT CHANGE UP (ref 1.01–1.03)
UROBILINOGEN FLD QL: 0.2 MG/DL — SIGNIFICANT CHANGE UP (ref 0.2–0.2)
WBC # BLD: 9.32 K/UL — SIGNIFICANT CHANGE UP (ref 4.8–10.8)
WBC # FLD AUTO: 9.32 K/UL — SIGNIFICANT CHANGE UP (ref 4.8–10.8)

## 2019-01-01 ENCOUNTER — OUTPATIENT (OUTPATIENT)
Dept: OUTPATIENT SERVICES | Facility: HOSPITAL | Age: 44
LOS: 1 days | End: 2019-01-01
Payer: MEDICAID

## 2019-01-01 DIAGNOSIS — Z98.890 OTHER SPECIFIED POSTPROCEDURAL STATES: Chronic | ICD-10-CM

## 2019-01-01 DIAGNOSIS — Z95.2 PRESENCE OF PROSTHETIC HEART VALVE: Chronic | ICD-10-CM

## 2019-01-02 ENCOUNTER — OUTPATIENT (OUTPATIENT)
Dept: OUTPATIENT SERVICES | Facility: HOSPITAL | Age: 44
LOS: 1 days | Discharge: HOME | End: 2019-01-02

## 2019-01-02 DIAGNOSIS — Z95.2 PRESENCE OF PROSTHETIC HEART VALVE: Chronic | ICD-10-CM

## 2019-01-02 DIAGNOSIS — Z79.01 LONG TERM (CURRENT) USE OF ANTICOAGULANTS: ICD-10-CM

## 2019-01-02 DIAGNOSIS — Z98.890 OTHER SPECIFIED POSTPROCEDURAL STATES: Chronic | ICD-10-CM

## 2019-01-02 DIAGNOSIS — Z95.2 PRESENCE OF PROSTHETIC HEART VALVE: ICD-10-CM

## 2019-01-02 LAB
POCT INR: 1.9 RATIO — HIGH (ref 0.9–1.2)
POCT PT: 22.8 SEC — HIGH (ref 10–13.4)

## 2019-01-03 ENCOUNTER — INPATIENT (INPATIENT)
Facility: HOSPITAL | Age: 44
LOS: 0 days | Discharge: HOME | End: 2019-01-04
Attending: STUDENT IN AN ORGANIZED HEALTH CARE EDUCATION/TRAINING PROGRAM | Admitting: STUDENT IN AN ORGANIZED HEALTH CARE EDUCATION/TRAINING PROGRAM

## 2019-01-03 VITALS — WEIGHT: 214.95 LBS

## 2019-01-03 DIAGNOSIS — Z95.2 PRESENCE OF PROSTHETIC HEART VALVE: Chronic | ICD-10-CM

## 2019-01-03 DIAGNOSIS — Z98.890 OTHER SPECIFIED POSTPROCEDURAL STATES: Chronic | ICD-10-CM

## 2019-01-03 DIAGNOSIS — I42.0 DILATED CARDIOMYOPATHY: ICD-10-CM

## 2019-01-03 RX ORDER — ATORVASTATIN CALCIUM 80 MG/1
40 TABLET, FILM COATED ORAL AT BEDTIME
Qty: 0 | Refills: 0 | Status: DISCONTINUED | OUTPATIENT
Start: 2019-01-03 | End: 2019-01-04

## 2019-01-03 RX ORDER — WARFARIN SODIUM 2.5 MG/1
2.5 TABLET ORAL ONCE
Qty: 0 | Refills: 0 | Status: COMPLETED | OUTPATIENT
Start: 2019-01-03 | End: 2019-01-03

## 2019-01-03 RX ORDER — WARFARIN SODIUM 2.5 MG/1
5 TABLET ORAL DAILY
Qty: 0 | Refills: 0 | Status: DISCONTINUED | OUTPATIENT
Start: 2019-01-03 | End: 2019-01-04

## 2019-01-03 RX ORDER — WARFARIN SODIUM 2.5 MG/1
5 TABLET ORAL DAILY
Qty: 0 | Refills: 0 | Status: DISCONTINUED | OUTPATIENT
Start: 2019-01-03 | End: 2019-01-03

## 2019-01-03 RX ORDER — METOPROLOL TARTRATE 50 MG
50 TABLET ORAL DAILY
Qty: 0 | Refills: 0 | Status: DISCONTINUED | OUTPATIENT
Start: 2019-01-03 | End: 2019-01-04

## 2019-01-03 RX ORDER — SODIUM CHLORIDE 9 MG/ML
1000 INJECTION INTRAMUSCULAR; INTRAVENOUS; SUBCUTANEOUS
Qty: 0 | Refills: 0 | Status: DISCONTINUED | OUTPATIENT
Start: 2019-01-03 | End: 2019-01-04

## 2019-01-03 RX ORDER — VANCOMYCIN HCL 1 G
1000 VIAL (EA) INTRAVENOUS ONCE
Qty: 0 | Refills: 0 | Status: COMPLETED | OUTPATIENT
Start: 2019-01-03 | End: 2019-01-03

## 2019-01-03 RX ORDER — VANCOMYCIN HCL 1 G
1000 VIAL (EA) INTRAVENOUS EVERY 12 HOURS
Qty: 0 | Refills: 0 | Status: COMPLETED | OUTPATIENT
Start: 2019-01-03 | End: 2019-01-04

## 2019-01-03 RX ORDER — LOSARTAN POTASSIUM 100 MG/1
50 TABLET, FILM COATED ORAL DAILY
Qty: 0 | Refills: 0 | Status: DISCONTINUED | OUTPATIENT
Start: 2019-01-03 | End: 2019-01-04

## 2019-01-03 RX ORDER — FUROSEMIDE 40 MG
40 TABLET ORAL DAILY
Qty: 0 | Refills: 0 | Status: DISCONTINUED | OUTPATIENT
Start: 2019-01-03 | End: 2019-01-04

## 2019-01-03 RX ADMIN — WARFARIN SODIUM 2.5 MILLIGRAM(S): 2.5 TABLET ORAL at 23:23

## 2019-01-03 RX ADMIN — Medication 166.67 MILLIGRAM(S): at 08:44

## 2019-01-03 RX ADMIN — SODIUM CHLORIDE 50 MILLILITER(S): 9 INJECTION INTRAMUSCULAR; INTRAVENOUS; SUBCUTANEOUS at 18:20

## 2019-01-03 RX ADMIN — ATORVASTATIN CALCIUM 40 MILLIGRAM(S): 80 TABLET, FILM COATED ORAL at 22:43

## 2019-01-03 RX ADMIN — Medication 250 MILLIGRAM(S): at 08:44

## 2019-01-03 RX ADMIN — Medication 250 MILLIGRAM(S): at 18:17

## 2019-01-03 NOTE — CHART NOTE - NSCHARTNOTEFT_GEN_A_CORE
Cardiac Electrophysiology Procedure Report    Date of procedure	1/3/19  EP Attending	Jacqueline Carl MD  Anesthesiologist	Edis Nuno DO  Referring Physician	Mehrdad Saravia MD  Procedure	Subcutaneous ICD Implant    A thorough discussion was had with the patient concerning all aspects of ICD therapy. We reviewed the data supporting ICD therapy and how it applies individually.  We discussed the procedures, risks and outcomes of ICD implantation and living with an ICD. We discussed management of ICD therapy throughout life, including deactivation of the ICD. After all questions were answered, it was a shared decision to proceed with ICD therapy.    Procedure:  Implant of Subcutaneous Cardioverter Defibrillator (ICD) Implantation  10 Joule Shock Delivered    Indication: 44 yo F with history of endocarditis s/p mechanical AVR and MVR on coumadin, NIDCM (EF 25%) presenting for subcutaneous ICD implantation for Primary Prevention.    EQUIPMENT IMPLANTED AND BASELINE PARAMETERS  Pulse Generator: [employer]SI ([employer] MRI S-ICD  :	8thBridge  Model Number:  A219	  Serial Number:   [id number]	  			  Ventricular Lead  Model Number:	3501/45cm  Serial Number:	[id number]	  Sensing Configuration:  Primary	  10 J Shock Impedance: 70 Ohms	          DESCRIPTION OF PROCEDURE  Screening: Prior to procedure, ECG screening for T wave over-sensing was performed as indicated. Patient was found to have vectors adequate for implant.    The patient was brought to the Procedure Room in a nonsedated and fasting state, having received preoperative antibiotics. Informed, written consent was obtained prior to the procedure.  Fluoroscopy was used to confirm optimal position of the lead and generator by placing both on the chest and comparing the position of the lead with respect to the RV and the position of the generator with respect to the apex of the heart. The positions were marked.     The chest was cleaned and prepped with serial applications of betadine and chlorhexidine solution. Patient was then covered from head to toe with sterile drapes in the usual manner. Anesthesia was present during the case. Blood pressure, oxygenation and level of comfort were stable throughout     The left mid axillary line was defined. 50/50 of Lidocaine/Marcaine solution was infiltrated into the skin overlying the left mid axillary line and a 4.0 cm incision was made. This incision was extended down to the fascia using a blunt and sharp dissection and electrocautery.   	  A 1 cm incision was performed to the left of the xiphoid process and below the sternal notch. The incision was extended down to the fascia using blunt and sharp dissection and electrocautery. Using a tunneling tool and sheath, a tract was created from the subxiphoid incision to the pocket. The lead was inserted into the sheath and the sleeve was secured to the fascia with two 0 silk sutures tied around the sewing sleeve encapsulating the lead. A second tunneling tool and sheath were used to tunnel from the subxiphoid incision up toward the sternal notch.     Next, the pocket was inspected for bleeding, and electrocautery applied where appropriate. The wound was irrigated with copious amounts of antibiotic solution. The lead was wiped clean and then connected to the pulse generator. The lead and pulse generator was coiled into the pocket. The generator was secured to the fascia with an 0 silk suture.     The margins of both wounds were approximated well, without any tension or overlap. One layer of both incisions was closed with an interrupted layer of 2-0 absorbable Vicryl suture for the deep fascial layer. A 10 J shock was delivered while the patient was properly sedated to measure an impedance.     This was followed by a continuous layer of 3-0 absorbable suture. The skin was then closed using Dermabond. Steri-strips were placed over the wound.  A dry, sterile dressing was placed over this. Needle count was performed and found to be consistent at the end of the procedure    COMPLICATIONS:  The patient tolerated the procedure well. There were no immediate complications.    CONCLUSIONS:  Successful implant of Subcutaneous Implantable Cardioverter Defibrillator (ICD)   10 J Impedance Shock Testing    EBL:  10 cc      _______________________________  Jacqueline Carl MD  Cardiac Electrophysiology

## 2019-01-04 ENCOUNTER — TRANSCRIPTION ENCOUNTER (OUTPATIENT)
Age: 44
End: 2019-01-04

## 2019-01-04 VITALS — WEIGHT: 232.37 LBS

## 2019-01-04 DIAGNOSIS — Z71.89 OTHER SPECIFIED COUNSELING: ICD-10-CM

## 2019-01-04 LAB
ALBUMIN SERPL ELPH-MCNC: 4.2 G/DL — SIGNIFICANT CHANGE UP (ref 3.5–5.2)
ALP SERPL-CCNC: 85 U/L — SIGNIFICANT CHANGE UP (ref 30–115)
ALT FLD-CCNC: 23 U/L — SIGNIFICANT CHANGE UP (ref 0–41)
ANION GAP SERPL CALC-SCNC: 13 MMOL/L — SIGNIFICANT CHANGE UP (ref 7–14)
AST SERPL-CCNC: 24 U/L — SIGNIFICANT CHANGE UP (ref 0–41)
BILIRUB SERPL-MCNC: 2.5 MG/DL — HIGH (ref 0.2–1.2)
BUN SERPL-MCNC: 15 MG/DL — SIGNIFICANT CHANGE UP (ref 10–20)
CALCIUM SERPL-MCNC: 8.8 MG/DL — SIGNIFICANT CHANGE UP (ref 8.5–10.1)
CHLORIDE SERPL-SCNC: 99 MMOL/L — SIGNIFICANT CHANGE UP (ref 98–110)
CO2 SERPL-SCNC: 28 MMOL/L — SIGNIFICANT CHANGE UP (ref 17–32)
CREAT SERPL-MCNC: 1.1 MG/DL — SIGNIFICANT CHANGE UP (ref 0.7–1.5)
GLUCOSE SERPL-MCNC: 97 MG/DL — SIGNIFICANT CHANGE UP (ref 70–99)
HCT VFR BLD CALC: 44.2 % — SIGNIFICANT CHANGE UP (ref 42–52)
HGB BLD-MCNC: 15 G/DL — SIGNIFICANT CHANGE UP (ref 14–18)
INR BLD: 1.84 RATIO — HIGH (ref 0.65–1.3)
MCHC RBC-ENTMCNC: 29.6 PG — SIGNIFICANT CHANGE UP (ref 27–31)
MCHC RBC-ENTMCNC: 33.9 G/DL — SIGNIFICANT CHANGE UP (ref 32–37)
MCV RBC AUTO: 87.4 FL — SIGNIFICANT CHANGE UP (ref 80–94)
NRBC # BLD: 0 /100 WBCS — SIGNIFICANT CHANGE UP (ref 0–0)
PLATELET # BLD AUTO: 190 K/UL — SIGNIFICANT CHANGE UP (ref 130–400)
POTASSIUM SERPL-MCNC: 4.1 MMOL/L — SIGNIFICANT CHANGE UP (ref 3.5–5)
POTASSIUM SERPL-SCNC: 4.1 MMOL/L — SIGNIFICANT CHANGE UP (ref 3.5–5)
PROT SERPL-MCNC: 7.1 G/DL — SIGNIFICANT CHANGE UP (ref 6–8)
PROTHROM AB SERPL-ACNC: 21 SEC — HIGH (ref 9.95–12.87)
RBC # BLD: 5.06 M/UL — SIGNIFICANT CHANGE UP (ref 4.7–6.1)
RBC # FLD: 12.6 % — SIGNIFICANT CHANGE UP (ref 11.5–14.5)
SODIUM SERPL-SCNC: 140 MMOL/L — SIGNIFICANT CHANGE UP (ref 135–146)
WBC # BLD: 12.63 K/UL — HIGH (ref 4.8–10.8)
WBC # FLD AUTO: 12.63 K/UL — HIGH (ref 4.8–10.8)

## 2019-01-04 RX ORDER — CEPHALEXIN 500 MG
1 CAPSULE ORAL
Qty: 10 | Refills: 0 | OUTPATIENT
Start: 2019-01-04 | End: 2019-01-08

## 2019-01-04 RX ADMIN — Medication 50 MILLIGRAM(S): at 05:58

## 2019-01-04 RX ADMIN — Medication 250 MILLIGRAM(S): at 05:58

## 2019-01-04 RX ADMIN — Medication 40 MILLIGRAM(S): at 05:58

## 2019-01-04 RX ADMIN — LOSARTAN POTASSIUM 50 MILLIGRAM(S): 100 TABLET, FILM COATED ORAL at 05:58

## 2019-01-04 NOTE — DISCHARGE NOTE ADULT - PATIENT PORTAL LINK FT
You can access the [facility] Patient Portal, offered by [facility], by registering with the following website: [email or web address]

## 2019-01-04 NOTE — DISCHARGE NOTE ADULT - MEDICATION SUMMARY - MEDICATIONS TO TAKE
I will START or STAY ON the medications listed below when I get home from the hospital:    losartan 50 mg oral tablet  -- 1 tab(s) by mouth once a day  -- Indication: For blood pressure    Coumadin 5 mg oral tablet  -- orally once a day on M W F and sunday   -- Indication: For blood thinner    Coumadin  -- 2.5 milligram(s) by mouth once a day on the other days  -- Indication: For blood thinner    atorvastatin 40 mg oral tablet  -- 1 tab(s) by mouth once a day (at bedtime)  -- Indication: For cholesterol    metoprolol succinate 50 mg oral tablet, extended release  -- 1 tab(s) by mouth once a day   -- It is very important that you take or use this exactly as directed.  Do not skip doses or discontinue unless directed by your doctor.  May cause drowsiness.  Alcohol may intensify this effect.  Use care when operating dangerous machinery.  Some non-prescription drugs may aggravate your condition.  Read all labels carefully.  If a warning appears, check with your doctor before taking.  Swallow whole.  Do not crush.  Take with food or milk.  This drug may impair the ability to drive or operate machinery.  Use care until you become familiar with its effects.    -- Indication: For blood pressure    furosemide 40 mg oral tablet  -- 1 tab(s) by mouth once a day   -- Indication: For Diuretic    Bactrim  mg-160 mg oral tablet  -- 1 tab(s) by mouth 2 times a day   -- Avoid prolonged or excessive exposure to direct and/or artificial sunlight while taking this medication.  Finish all this medication unless otherwise directed by prescriber.  Medication should be taken with plenty of water.    -- Indication: For antibiotic

## 2019-01-04 NOTE — DISCHARGE NOTE ADULT - CARE PROVIDER_API CALL
Jacqueline Carl), Medicine  Physicians  [address] [place] [address]  Phone: [phone]  Fax: [phone]

## 2019-01-04 NOTE — DISCHARGE NOTE ADULT - HOSPITAL COURSE
Pt presented for subcutaneous ICD placement, procedure went well without complications. Patient is feeling well and will be D/C'ed home.

## 2019-01-04 NOTE — PROGRESS NOTE ADULT - ATTENDING COMMENTS
Patient seen and examined.    S/p subcutaneous ICD. CXR from this morning done.    Discharge with follow up with me in the office in 3-4 weeks  Bactrim DS BID x 5 days on discharge  No shower until Monday

## 2019-01-04 NOTE — DISCHARGE NOTE ADULT - CARE PLAN
Principal Discharge DX:	ICD (implantable cardioverter-defibrillator) in place  Goal:	Remain asymptomatic  Assessment and plan of treatment:	Continue current medical therapy  Follow up in the office

## 2019-01-04 NOTE — PROGRESS NOTE ADULT - SUBJECTIVE AND OBJECTIVE BOX
Electrophysiology Brief Post-Op Note    I have personally seen and examined the patient.  I agree with the history and physical which I have reviewed and noted any changes below.  01-03-19 @ 8:30 AM    PRE-OP DIAGNOSIS: NIDCM    POST-OP DIAGNOSIS: NIDCM    PROCEDURE: Subcutaneous ICD    Physician: Jacqueline Carl MD  Assistant: None    ESTIMATED BLOOD LOSS:      10 mL    ANESTHESIA TYPE:  [  ]General Anesthesia  [ x ] Sedation  [ x ] Local/Regional    CONDITION  [  ] Critical  [  ] Serious  [  ] Fair  [ x ]Good      SPECIMENS REMOVED (IF APPLICABLE): None    IMPLANTS (IF APPLICABLE): Subcutaneous ICD      FINDINGS  PLAN OF CARE  - Vancomycin 1g IV q12 h  - Chest X-ray PA now and PA/Lat tomorrow am  - NO heparin products or lovenox  - Resume Warfarin tonight  - No shower until Sunday    Jacqueline Carl MD   Electrophysiology Attending
INTERVAL HPI/OVERNIGHT EVENTS: No acute events overnight    MEDICATIONS  (STANDING):  atorvastatin 40 milliGRAM(s) Oral at bedtime  furosemide    Tablet 40 milliGRAM(s) Oral daily  losartan 50 milliGRAM(s) Oral daily  metoprolol succinate ER 50 milliGRAM(s) Oral daily  sodium chloride 0.9%. 1000 milliLiter(s) (50 mL/Hr) IV Continuous <Continuous>  warfarin 5 milliGRAM(s) Oral daily    Allergies    penicillin (Unknown)    REVIEW OF SYSTEMS: Admits to tenderness over wounds but otherwise denies chest pain, palpitations, dizziness or syncope. No leg pain or swelling.    Vital Signs Last 24 Hrs  T(C): 36.1 (04 Jan 2019 05:43), Max: 36.4 (03 Jan 2019 20:27)  T(F): 96.9 (04 Jan 2019 05:43), Max: 97.5 (03 Jan 2019 20:27)  HR: 97 (04 Jan 2019 05:43) (97 - 97)  BP: 142/86 (04 Jan 2019 05:43) (123/79 - 142/86)  BP(mean): --  RR: 18 (04 Jan 2019 05:43) (18 - 18)  SpO2: --    01-03-19 @ 07:01  -  01-04-19 @ 07:00  --------------------------------------------------------  IN: 550 mL / OUT: 300 mL / NET: 250 mL        Physical Exam  GENERAL: In no apparent distress, well nourished, and hydrated.  HEART: Regular rate and rhythm; No murmurs, rubs, or gallops.  PULMONARY: Clear to auscultation and perfusion.  No rales, wheezing, or rhonchi bilaterally.  ABDOMEN: Soft, Nontender, Nondistended; Bowel sounds present  EXTREMITIES:  2+ Peripheral Pulses, No clubbing, cyanosis, or edema  SKIN: Wound to L mid axillary line and wound to mid sternal chest, both well appearing with no hematoma or sign of infection.    LABS:                        15.0   12.63 )-----------( 190      ( 04 Jan 2019 07:38 )             44.2     01-04    140  |  99  |  15  ----------------------------<  97  4.1   |  28  |  1.1    Ca    8.8      04 Jan 2019 07:38    TPro  7.1  /  Alb  4.2  /  TBili  2.5<H>  /  DBili  x   /  AST  24  /  ALT  23  /  AlkPhos  85  01-04    PT/INR - ( 04 Jan 2019 07:38 )   PT: 21.00 sec;   INR: 1.84 ratio           TELE: NSR @ 87 bpm with occasional PVC    RADIOLOGY & ADDITIONAL TESTS:  12 Lead ECG (01.04.19 @ 07:44)  Ventricular Rate 92 BPM    Atrial Rate 92 BPM    P-R Interval 130 ms    QRS Duration 106 ms    Q-T Interval 382 ms    QTC Calculation(Bezet) 472 ms    P Axis 22 degrees    R Axis 40 degrees    T Axis -3 degrees    Diagnosis Line Normal sinus rhythm  Cannot rule out Inferior infarct , age undetermined  Abnormal ECG    Confirmed by YOBANI KAPADIA MD ([id number]) on 1/4/2019 9:29:40 AM      Xray Chest 1 View AP/PA (01.03.19 @ 13:13)  EXAM:  XR CHEST FRONTAL 1V            PROCEDURE DATE:  01/03/2019        INTERPRETATION:  Clinical History / Reason for exam: Postoperative.    Comparison : Chest radiograph earlier the same day.    Technique/Positioning: Frontal portable, low lung volumes.    Findings:    Support devices: Satisfactory position.    Cardiac/mediastinum/hilum: Heart is enlarged. The patient is status post   median sternotomy and valve repair.    Lung parenchyma/Pleura: Within normal limits.    Skeleton/soft tissues: Unchanged.    Impression:      Cardiomegaly without acute opacifications or effusions.    TERRI HOUSTON M.D., ATTENDING RADIOLOGIST  This document has been electronically signed. Kalen  3 2019  1:26PM

## 2019-01-04 NOTE — PROGRESS NOTE ADULT - ASSESSMENT
Assessment: Pt is a 44 yo M with hx of both aortic valve and mitral valve replacements (metallic valves) due to infective endocarditis resulting in NID sp subcutaneous ICD placement x 1 day. Pt reports mild tenderness around the incision site but otherwise asymptomatic at this time.    NID SP ICD placement  Plan:  - CXR pending  - Interrogation pending  - Keflex 500 mg BID x 5 days  - Do not lift L arm above 90 degrees for 1 month  - No heavy lifting (>5 lbs) for one month  - No shower for 5 days  - Follow up with Dr Carl in 3-4 weeks Assessment: Pt is a 42 yo M with hx of both aortic valve and mitral valve replacements (metallic valves) due to infective endocarditis resulting in NIDCM sp subcutaneous ICD placement x 1 day. Pt reports mild tenderness around the incision site but otherwise asymptomatic at this time.    NID SP ICD placement  Plan:  - CXR pending  - Bactrim DS BID x 5 days  - Do not lift L arm above 90 degrees for 1 month  - No heavy lifting (>5 lbs) for one month  - No shower for 3 days  - Follow up with Dr Carl in 3-4 weeks

## 2019-01-08 ENCOUNTER — OUTPATIENT (OUTPATIENT)
Dept: OUTPATIENT SERVICES | Facility: HOSPITAL | Age: 44
LOS: 1 days | Discharge: HOME | End: 2019-01-08

## 2019-01-08 DIAGNOSIS — Z95.4 PRESENCE OF OTHER HEART-VALVE REPLACEMENT: ICD-10-CM

## 2019-01-08 DIAGNOSIS — Z79.01 LONG TERM (CURRENT) USE OF ANTICOAGULANTS: ICD-10-CM

## 2019-01-08 DIAGNOSIS — Z98.890 OTHER SPECIFIED POSTPROCEDURAL STATES: Chronic | ICD-10-CM

## 2019-01-08 DIAGNOSIS — Z95.2 PRESENCE OF PROSTHETIC HEART VALVE: Chronic | ICD-10-CM

## 2019-01-08 DIAGNOSIS — Z86.79 PERSONAL HISTORY OF OTHER DISEASES OF THE CIRCULATORY SYSTEM: ICD-10-CM

## 2019-01-08 DIAGNOSIS — Z95.2 PRESENCE OF PROSTHETIC HEART VALVE: ICD-10-CM

## 2019-01-08 DIAGNOSIS — Z88.0 ALLERGY STATUS TO PENICILLIN: ICD-10-CM

## 2019-01-08 LAB
POCT INR: 2.5 RATIO — HIGH (ref 0.9–1.2)
POCT PT: 29.5 SEC — HIGH (ref 10–13.4)

## 2019-01-15 ENCOUNTER — OUTPATIENT (OUTPATIENT)
Dept: OUTPATIENT SERVICES | Facility: HOSPITAL | Age: 44
LOS: 1 days | Discharge: HOME | End: 2019-01-15

## 2019-01-15 DIAGNOSIS — Z95.2 PRESENCE OF PROSTHETIC HEART VALVE: Chronic | ICD-10-CM

## 2019-01-15 DIAGNOSIS — Z95.2 PRESENCE OF PROSTHETIC HEART VALVE: ICD-10-CM

## 2019-01-15 DIAGNOSIS — Z79.01 LONG TERM (CURRENT) USE OF ANTICOAGULANTS: ICD-10-CM

## 2019-01-15 DIAGNOSIS — Z98.890 OTHER SPECIFIED POSTPROCEDURAL STATES: Chronic | ICD-10-CM

## 2019-01-15 LAB
POCT INR: 3.1 RATIO — HIGH (ref 0.9–1.2)
POCT PT: 37.2 SEC — HIGH (ref 10–13.4)

## 2019-01-23 ENCOUNTER — OUTPATIENT (OUTPATIENT)
Dept: OUTPATIENT SERVICES | Facility: HOSPITAL | Age: 44
LOS: 1 days | Discharge: HOME | End: 2019-01-23

## 2019-01-23 DIAGNOSIS — Z95.2 PRESENCE OF PROSTHETIC HEART VALVE: Chronic | ICD-10-CM

## 2019-01-23 DIAGNOSIS — Z98.890 OTHER SPECIFIED POSTPROCEDURAL STATES: Chronic | ICD-10-CM

## 2019-01-23 DIAGNOSIS — Z95.2 PRESENCE OF PROSTHETIC HEART VALVE: ICD-10-CM

## 2019-01-23 DIAGNOSIS — Z79.01 LONG TERM (CURRENT) USE OF ANTICOAGULANTS: ICD-10-CM

## 2019-02-04 DIAGNOSIS — Z76.89 PERSONS ENCOUNTERING HEALTH SERVICES IN OTHER SPECIFIED CIRCUMSTANCES: ICD-10-CM

## 2019-02-06 ENCOUNTER — OUTPATIENT (OUTPATIENT)
Dept: OUTPATIENT SERVICES | Facility: HOSPITAL | Age: 44
LOS: 1 days | Discharge: HOME | End: 2019-02-06

## 2019-02-06 DIAGNOSIS — Z95.2 PRESENCE OF PROSTHETIC HEART VALVE: ICD-10-CM

## 2019-02-06 DIAGNOSIS — Z98.890 OTHER SPECIFIED POSTPROCEDURAL STATES: Chronic | ICD-10-CM

## 2019-02-06 DIAGNOSIS — Z79.01 LONG TERM (CURRENT) USE OF ANTICOAGULANTS: ICD-10-CM

## 2019-02-06 DIAGNOSIS — Z95.2 PRESENCE OF PROSTHETIC HEART VALVE: Chronic | ICD-10-CM

## 2019-02-20 ENCOUNTER — OUTPATIENT (OUTPATIENT)
Dept: OUTPATIENT SERVICES | Facility: HOSPITAL | Age: 44
LOS: 1 days | Discharge: HOME | End: 2019-02-20

## 2019-02-20 DIAGNOSIS — Z79.01 LONG TERM (CURRENT) USE OF ANTICOAGULANTS: ICD-10-CM

## 2019-02-20 DIAGNOSIS — Z95.2 PRESENCE OF PROSTHETIC HEART VALVE: Chronic | ICD-10-CM

## 2019-02-20 DIAGNOSIS — Z98.890 OTHER SPECIFIED POSTPROCEDURAL STATES: Chronic | ICD-10-CM

## 2019-02-20 DIAGNOSIS — Z95.2 PRESENCE OF PROSTHETIC HEART VALVE: ICD-10-CM

## 2019-02-27 ENCOUNTER — APPOINTMENT (OUTPATIENT)
Dept: CARDIOLOGY | Facility: CLINIC | Age: 44
End: 2019-02-27

## 2019-02-27 VITALS
HEART RATE: 66 BPM | DIASTOLIC BLOOD PRESSURE: 86 MMHG | SYSTOLIC BLOOD PRESSURE: 125 MMHG | HEIGHT: 66 IN | BODY MASS INDEX: 35.36 KG/M2 | OXYGEN SATURATION: 94 % | WEIGHT: 220 LBS

## 2019-02-27 DIAGNOSIS — I35.8 OTHER NONRHEUMATIC AORTIC VALVE DISORDERS: ICD-10-CM

## 2019-02-27 NOTE — ASSESSMENT
[FreeTextEntry1] : Mr. Wooten has a history of infective endocarditis secondary to a dental infection s/p mechanical aortic and mitral valve (July 2017) and he now presents for follow up evaluation after having subcutaneous ICD implanted for primary prevention for nonischemic dilated cardiomyopathy. He has been on guideline directed medical therapy and has NYHA Class 2 symptoms. He has been doing well from a cardiovascular standpoint and has healed well from device implant. He is set up with the remote monitor. I have asked him to continue his present medications. He is on warfarin for his valves and has INRs checked at the coumadin clinic. \par \par I have advised the patient to go to the nearest emergency room if he experiences any ICD shocks, chest pain, dyspnea, syncope, or has any other compelling symptoms.\par \par Follow up in 6 months. \par

## 2019-02-27 NOTE — HISTORY OF PRESENT ILLNESS
[de-identified] : Cardiologist: Dr. Saravia \par \par 44 yo M, former , with history of infective endocarditis after tooth infection s/p mechanical aortic and and aortic valve replacement on coumadin (INR 2.5-3.5) in July 2017 at [facility] with an admission to [facility] in July with acute systolic heart failure exacerbation. Patient states that was the first time he was ever informed of cardiomyopathy. He was experiencing heart failure symptoms including dyspnea at rest and PND. He was prescribed Entresto but couldn't afford it due to insurance issues. \par \par Since discharge, he has been taking his meds as prescribed. He wore the LifeVest and is now s/p subcutaneous ICD. He has a remote monitor set up. He has been doing well since implant and has healed well. He was satisfied with the procedure and all post op care. \par \par He denies chest pain, dyspnea, dizziness, lightheadedness, presyncope or syncope. No PND or lower extremity edema. Stable 2 pillow orthopnea.\par

## 2019-02-27 NOTE — PROCEDURE
[No] : not [NSR] : normal sinus rhythm [ICD] : Implantable cardioverter-defibrillator [Lead Imp:  ___ohms] : lead impedance was [unfilled] ohms [Sensing Amplitude ___mv] : sensing amplitude was [unfilled] mv [Programmed for Longevity] : output reprogrammed for improved battery longevity [Counters Reset] : the counters were reset [de-identified] : [employer] [de-identified] : A219 [de-identified] : [id number] [de-identified] : 01/03/2019 [de-identified] : 100% to [facility] [de-identified] : No events.

## 2019-02-27 NOTE — PHYSICAL EXAM
[General Appearance - Well Developed] : well developed [Normal Appearance] : normal appearance [Well Groomed] : well groomed [General Appearance - Well Nourished] : well nourished [No Deformities] : no deformities [General Appearance - In No Acute Distress] : no acute distress [Heart Rate And Rhythm] : heart rate and rhythm were normal [Heart Sounds] : normal S1 and S2 [Murmurs] : no murmurs present [Edema] : no peripheral edema present [] : no respiratory distress [Respiration, Rhythm And Depth] : normal respiratory rhythm and effort [Exaggerated Use Of Accessory Muscles For Inspiration] : no accessory muscle use [Auscultation Breath Sounds / Voice Sounds] : lungs were clear to auscultation bilaterally [Clean] : clean [Dry] : dry [Erythema] : not erythematous [Warm] : not warm [Tender] : not tender [FreeTextEntry1] : Left sided axillary and subxiphoid incisions are well healed [Bowel Sounds] : normal bowel sounds [Abdomen Soft] : soft [Abdomen Tenderness] : non-tender [Nail Clubbing] : no clubbing of the fingernails [Cyanosis, Localized] : no localized cyanosis

## 2019-03-06 ENCOUNTER — OUTPATIENT (OUTPATIENT)
Dept: OUTPATIENT SERVICES | Facility: HOSPITAL | Age: 44
LOS: 1 days | Discharge: HOME | End: 2019-03-06

## 2019-03-06 DIAGNOSIS — Z98.890 OTHER SPECIFIED POSTPROCEDURAL STATES: Chronic | ICD-10-CM

## 2019-03-06 DIAGNOSIS — Z95.2 PRESENCE OF PROSTHETIC HEART VALVE: ICD-10-CM

## 2019-03-06 DIAGNOSIS — Z95.2 PRESENCE OF PROSTHETIC HEART VALVE: Chronic | ICD-10-CM

## 2019-03-06 DIAGNOSIS — Z79.01 LONG TERM (CURRENT) USE OF ANTICOAGULANTS: ICD-10-CM

## 2019-03-13 ENCOUNTER — OUTPATIENT (OUTPATIENT)
Dept: OUTPATIENT SERVICES | Facility: HOSPITAL | Age: 44
LOS: 1 days | Discharge: HOME | End: 2019-03-13

## 2019-03-13 DIAGNOSIS — Z95.2 PRESENCE OF PROSTHETIC HEART VALVE: ICD-10-CM

## 2019-03-13 DIAGNOSIS — Z98.890 OTHER SPECIFIED POSTPROCEDURAL STATES: Chronic | ICD-10-CM

## 2019-03-13 DIAGNOSIS — Z95.2 PRESENCE OF PROSTHETIC HEART VALVE: Chronic | ICD-10-CM

## 2019-03-13 DIAGNOSIS — Z79.01 LONG TERM (CURRENT) USE OF ANTICOAGULANTS: ICD-10-CM

## 2019-03-27 ENCOUNTER — OUTPATIENT (OUTPATIENT)
Dept: OUTPATIENT SERVICES | Facility: HOSPITAL | Age: 44
LOS: 1 days | Discharge: HOME | End: 2019-03-27

## 2019-03-27 DIAGNOSIS — Z95.2 PRESENCE OF PROSTHETIC HEART VALVE: Chronic | ICD-10-CM

## 2019-03-27 DIAGNOSIS — Z98.890 OTHER SPECIFIED POSTPROCEDURAL STATES: Chronic | ICD-10-CM

## 2019-03-27 DIAGNOSIS — Z95.2 PRESENCE OF PROSTHETIC HEART VALVE: ICD-10-CM

## 2019-03-27 DIAGNOSIS — Z79.01 LONG TERM (CURRENT) USE OF ANTICOAGULANTS: ICD-10-CM

## 2019-03-27 LAB
POCT INR: 2.4 RATIO — HIGH (ref 0.9–1.2)
POCT PT: 29 SEC — HIGH (ref 10–13.4)

## 2019-04-02 ENCOUNTER — OUTPATIENT (OUTPATIENT)
Dept: OUTPATIENT SERVICES | Facility: HOSPITAL | Age: 44
LOS: 1 days | Discharge: HOME | End: 2019-04-02

## 2019-04-02 DIAGNOSIS — Z95.2 PRESENCE OF PROSTHETIC HEART VALVE: ICD-10-CM

## 2019-04-02 DIAGNOSIS — Z98.890 OTHER SPECIFIED POSTPROCEDURAL STATES: Chronic | ICD-10-CM

## 2019-04-02 DIAGNOSIS — Z95.2 PRESENCE OF PROSTHETIC HEART VALVE: Chronic | ICD-10-CM

## 2019-04-02 DIAGNOSIS — Z79.01 LONG TERM (CURRENT) USE OF ANTICOAGULANTS: ICD-10-CM

## 2019-04-02 LAB
POCT INR: 2.9 RATIO — HIGH (ref 0.9–1.2)
POCT PT: 34.3 SEC — HIGH (ref 10–13.4)

## 2019-04-20 ENCOUNTER — OUTPATIENT (OUTPATIENT)
Dept: OUTPATIENT SERVICES | Facility: HOSPITAL | Age: 44
LOS: 1 days | Discharge: HOME | End: 2019-04-20

## 2019-04-20 DIAGNOSIS — Z98.890 OTHER SPECIFIED POSTPROCEDURAL STATES: Chronic | ICD-10-CM

## 2019-04-20 DIAGNOSIS — Z95.2 PRESENCE OF PROSTHETIC HEART VALVE: ICD-10-CM

## 2019-04-20 DIAGNOSIS — Z95.2 PRESENCE OF PROSTHETIC HEART VALVE: Chronic | ICD-10-CM

## 2019-04-20 DIAGNOSIS — Z79.01 LONG TERM (CURRENT) USE OF ANTICOAGULANTS: ICD-10-CM

## 2019-04-20 LAB
POCT INR: 2.8 RATIO — HIGH (ref 0.9–1.2)
POCT PT: 34 SEC — HIGH (ref 10–13.4)

## 2019-05-04 ENCOUNTER — OUTPATIENT (OUTPATIENT)
Dept: OUTPATIENT SERVICES | Facility: HOSPITAL | Age: 44
LOS: 1 days | Discharge: HOME | End: 2019-05-04

## 2019-05-04 DIAGNOSIS — Z95.2 PRESENCE OF PROSTHETIC HEART VALVE: Chronic | ICD-10-CM

## 2019-05-04 DIAGNOSIS — Z95.2 PRESENCE OF PROSTHETIC HEART VALVE: ICD-10-CM

## 2019-05-04 DIAGNOSIS — Z79.01 LONG TERM (CURRENT) USE OF ANTICOAGULANTS: ICD-10-CM

## 2019-05-04 DIAGNOSIS — Z98.890 OTHER SPECIFIED POSTPROCEDURAL STATES: Chronic | ICD-10-CM

## 2019-05-04 LAB
POCT INR: 2.9 RATIO — HIGH (ref 0.9–1.2)
POCT PT: 35.4 SEC — HIGH (ref 10–13.4)

## 2019-05-18 ENCOUNTER — OUTPATIENT (OUTPATIENT)
Dept: OUTPATIENT SERVICES | Facility: HOSPITAL | Age: 44
LOS: 1 days | Discharge: HOME | End: 2019-05-18

## 2019-05-18 DIAGNOSIS — Z95.2 PRESENCE OF PROSTHETIC HEART VALVE: Chronic | ICD-10-CM

## 2019-05-18 DIAGNOSIS — Z98.890 OTHER SPECIFIED POSTPROCEDURAL STATES: Chronic | ICD-10-CM

## 2019-05-18 DIAGNOSIS — Z95.2 PRESENCE OF PROSTHETIC HEART VALVE: ICD-10-CM

## 2019-05-18 DIAGNOSIS — Z79.01 LONG TERM (CURRENT) USE OF ANTICOAGULANTS: ICD-10-CM

## 2019-05-18 LAB
POCT INR: 3.2 RATIO — HIGH (ref 0.9–1.2)
POCT PT: 38.9 SEC — HIGH (ref 10–13.4)

## 2019-06-01 ENCOUNTER — OUTPATIENT (OUTPATIENT)
Dept: OUTPATIENT SERVICES | Facility: HOSPITAL | Age: 44
LOS: 1 days | Discharge: HOME | End: 2019-06-01

## 2019-06-01 DIAGNOSIS — Z95.2 PRESENCE OF PROSTHETIC HEART VALVE: ICD-10-CM

## 2019-06-01 DIAGNOSIS — Z98.890 OTHER SPECIFIED POSTPROCEDURAL STATES: Chronic | ICD-10-CM

## 2019-06-01 DIAGNOSIS — Z95.2 PRESENCE OF PROSTHETIC HEART VALVE: Chronic | ICD-10-CM

## 2019-06-01 DIAGNOSIS — Z79.01 LONG TERM (CURRENT) USE OF ANTICOAGULANTS: ICD-10-CM

## 2019-06-01 LAB
POCT INR: 3.3 RATIO — HIGH (ref 0.9–1.2)
POCT PT: 39.4 SEC — HIGH (ref 10–13.4)

## 2019-06-01 PROCEDURE — G9001: CPT

## 2019-06-01 PROCEDURE — G9005: CPT

## 2019-06-15 ENCOUNTER — OUTPATIENT (OUTPATIENT)
Dept: OUTPATIENT SERVICES | Facility: HOSPITAL | Age: 44
LOS: 1 days | Discharge: HOME | End: 2019-06-15

## 2019-06-15 DIAGNOSIS — Z95.2 PRESENCE OF PROSTHETIC HEART VALVE: Chronic | ICD-10-CM

## 2019-06-15 DIAGNOSIS — Z98.890 OTHER SPECIFIED POSTPROCEDURAL STATES: Chronic | ICD-10-CM

## 2019-06-15 DIAGNOSIS — Z79.01 LONG TERM (CURRENT) USE OF ANTICOAGULANTS: ICD-10-CM

## 2019-06-15 DIAGNOSIS — Z95.2 PRESENCE OF PROSTHETIC HEART VALVE: ICD-10-CM

## 2019-06-15 LAB
POCT INR: 2.6 RATIO — HIGH (ref 0.9–1.2)
POCT PT: 31.5 SEC — HIGH (ref 10–13.4)

## 2019-06-29 ENCOUNTER — OUTPATIENT (OUTPATIENT)
Dept: OUTPATIENT SERVICES | Facility: HOSPITAL | Age: 44
LOS: 1 days | Discharge: HOME | End: 2019-06-29

## 2019-06-29 DIAGNOSIS — Z98.890 OTHER SPECIFIED POSTPROCEDURAL STATES: Chronic | ICD-10-CM

## 2019-06-29 DIAGNOSIS — Z95.2 PRESENCE OF PROSTHETIC HEART VALVE: ICD-10-CM

## 2019-06-29 DIAGNOSIS — Z95.2 PRESENCE OF PROSTHETIC HEART VALVE: Chronic | ICD-10-CM

## 2019-06-29 DIAGNOSIS — Z79.01 LONG TERM (CURRENT) USE OF ANTICOAGULANTS: ICD-10-CM

## 2019-06-29 LAB
POCT INR: 3.3 RATIO — HIGH (ref 0.9–1.2)
POCT PT: 39.9 SEC — HIGH (ref 10–13.4)

## 2019-07-13 ENCOUNTER — OUTPATIENT (OUTPATIENT)
Dept: OUTPATIENT SERVICES | Facility: HOSPITAL | Age: 44
LOS: 1 days | Discharge: HOME | End: 2019-07-13

## 2019-07-13 DIAGNOSIS — Z95.2 PRESENCE OF PROSTHETIC HEART VALVE: Chronic | ICD-10-CM

## 2019-07-13 DIAGNOSIS — Z79.01 LONG TERM (CURRENT) USE OF ANTICOAGULANTS: ICD-10-CM

## 2019-07-13 DIAGNOSIS — Z98.890 OTHER SPECIFIED POSTPROCEDURAL STATES: Chronic | ICD-10-CM

## 2019-07-13 DIAGNOSIS — Z95.2 PRESENCE OF PROSTHETIC HEART VALVE: ICD-10-CM

## 2019-07-13 LAB
POCT INR: 4.1 RATIO — HIGH (ref 0.9–1.2)
POCT PT: 49.2 SEC — HIGH (ref 10–13.4)

## 2019-07-27 ENCOUNTER — OUTPATIENT (OUTPATIENT)
Dept: OUTPATIENT SERVICES | Facility: HOSPITAL | Age: 44
LOS: 1 days | Discharge: HOME | End: 2019-07-27

## 2019-07-27 DIAGNOSIS — Z98.890 OTHER SPECIFIED POSTPROCEDURAL STATES: Chronic | ICD-10-CM

## 2019-07-27 DIAGNOSIS — Z79.01 LONG TERM (CURRENT) USE OF ANTICOAGULANTS: ICD-10-CM

## 2019-07-27 DIAGNOSIS — Z95.2 PRESENCE OF PROSTHETIC HEART VALVE: Chronic | ICD-10-CM

## 2019-07-27 DIAGNOSIS — Z95.2 PRESENCE OF PROSTHETIC HEART VALVE: ICD-10-CM

## 2019-07-27 LAB
POCT INR: 2.9 RATIO — HIGH (ref 0.9–1.2)
POCT PT: 34.7 SEC — HIGH (ref 10–13.4)

## 2019-08-10 ENCOUNTER — OUTPATIENT (OUTPATIENT)
Dept: OUTPATIENT SERVICES | Facility: HOSPITAL | Age: 44
LOS: 1 days | Discharge: HOME | End: 2019-08-10

## 2019-08-10 DIAGNOSIS — Z98.890 OTHER SPECIFIED POSTPROCEDURAL STATES: Chronic | ICD-10-CM

## 2019-08-10 DIAGNOSIS — Z95.2 PRESENCE OF PROSTHETIC HEART VALVE: Chronic | ICD-10-CM

## 2019-08-10 DIAGNOSIS — Z95.2 PRESENCE OF PROSTHETIC HEART VALVE: ICD-10-CM

## 2019-08-10 DIAGNOSIS — Z79.01 LONG TERM (CURRENT) USE OF ANTICOAGULANTS: ICD-10-CM

## 2019-08-10 LAB
POCT INR: 2.8 RATIO — HIGH (ref 0.9–1.2)
POCT PT: 34.1 SEC — HIGH (ref 10–13.4)

## 2019-08-21 ENCOUNTER — APPOINTMENT (OUTPATIENT)
Dept: CARDIOLOGY | Facility: CLINIC | Age: 44
End: 2019-08-21
Payer: COMMERCIAL

## 2019-08-21 VITALS
SYSTOLIC BLOOD PRESSURE: 121 MMHG | HEART RATE: 66 BPM | BODY MASS INDEX: 32.95 KG/M2 | HEIGHT: 66 IN | DIASTOLIC BLOOD PRESSURE: 83 MMHG | WEIGHT: 205 LBS

## 2019-08-21 PROCEDURE — 93260 PRGRMG DEV EVAL IMPLTBL SYS: CPT

## 2019-08-21 PROCEDURE — 93000 ELECTROCARDIOGRAM COMPLETE: CPT | Mod: 59

## 2019-08-21 PROCEDURE — 99213 OFFICE O/P EST LOW 20 MIN: CPT

## 2019-08-21 NOTE — HISTORY OF PRESENT ILLNESS
[de-identified] : \par Cardiologist: Dr. Saravia \par \par 44 yo M, former , with history of infective endocarditis after tooth infection s/p mechanical aortic and and aortic valve replacement on coumadin (INR 2.5-3.5) in July 2017 at [facility] with an admission to [facility] in July with acute systolic heart failure exacerbation. Patient states that was the first time he was ever informed of cardiomyopathy. He was experiencing heart failure symptoms including dyspnea at rest and PND. He was prescribed Entresto but couldn't afford it due to insurance issues. Since discharge, he took his meds as prescribed. He wore the LifeVest and is now s/p subcutaneous ICD. He has a remote monitor set up. \par \par Since his last visit he lost ~ 20 lbs by doing the keto diet. He denies chest pain, dyspnea, dizziness, lightheadedness, presyncope or syncope. No PND or lower extremity edema. Stable 2 pillow orthopnea.\par

## 2019-08-21 NOTE — ASSESSMENT
[FreeTextEntry1] : Mr. Wooten has a history of infective endocarditis secondary to dental infection s/p mechanical aortic and mitral valve (July 2017). He received subcutaneous ICD for primary prevention for NIDCM in Jan 2018. He has been on guideline directed medical therapy and has NYHA Class 2 symptoms. He has been doing well from a cardiovascular standpoint and has healed well from device implant. I interrogated his device as listed above. He is set up with the remote monitor. I have asked him to continue his present medications. He is on warfarin for his valves and has INRs checked at the coumadin clinic. No bleeding issues. \par \par I have advised the patient to go to the nearest emergency room if he experiences any ICD shocks, chest pain, dyspnea, syncope, or has any other compelling symptoms.\par \par Follow up in 9 months. \par

## 2019-08-21 NOTE — PROCEDURE
[NSR] : normal sinus rhythm [ICD] : Implantable cardioverter-defibrillator [Longevity: ___ months] : The estimated remaining battery life is [unfilled] months [Lead Imp:  ___ohms] : lead impedance was [unfilled] ohms [Programmed for Longevity] : output reprogrammed for improved battery longevity [de-identified] : [facility] [Threshold Testing Performed] : Threshold testing was not performed [de-identified] : [id number] [de-identified] : A219 [de-identified] : No events. Normal SubQ ICD function. [de-identified] : 1/3/19

## 2019-08-21 NOTE — PHYSICAL EXAM
[General Appearance - Well Developed] : well developed [Normal Appearance] : normal appearance [Well Groomed] : well groomed [No Deformities] : no deformities [General Appearance - In No Acute Distress] : no acute distress [Heart Rate And Rhythm] : heart rate and rhythm were normal [Heart Sounds] : normal S1 and S2 [Edema] : no peripheral edema present [] : no respiratory distress [Respiration, Rhythm And Depth] : normal respiratory rhythm and effort [Exaggerated Use Of Accessory Muscles For Inspiration] : no accessory muscle use [Auscultation Breath Sounds / Voice Sounds] : lungs were clear to auscultation bilaterally [Clean] : clean [Dry] : dry [Well-Healed] : well-healed [Erythema] : not erythematous [Warm] : not warm [Tender] : not tender [FreeTextEntry1] : Left sided axillary and subxiphoid incisions are well healed [Abdomen Soft] : soft [Bowel Sounds] : normal bowel sounds [Nail Clubbing] : no clubbing of the fingernails [Cyanosis, Localized] : no localized cyanosis

## 2019-08-24 ENCOUNTER — OUTPATIENT (OUTPATIENT)
Dept: OUTPATIENT SERVICES | Facility: HOSPITAL | Age: 44
LOS: 1 days | Discharge: HOME | End: 2019-08-24

## 2019-08-24 DIAGNOSIS — Z95.2 PRESENCE OF PROSTHETIC HEART VALVE: Chronic | ICD-10-CM

## 2019-08-24 DIAGNOSIS — Z98.890 OTHER SPECIFIED POSTPROCEDURAL STATES: Chronic | ICD-10-CM

## 2019-08-24 DIAGNOSIS — Z79.01 LONG TERM (CURRENT) USE OF ANTICOAGULANTS: ICD-10-CM

## 2019-08-24 DIAGNOSIS — Z95.2 PRESENCE OF PROSTHETIC HEART VALVE: ICD-10-CM

## 2019-08-24 LAB
POCT INR: 2.6 RATIO — HIGH (ref 0.9–1.2)
POCT PT: 31.1 SEC — HIGH (ref 10–13.4)

## 2019-09-07 ENCOUNTER — OUTPATIENT (OUTPATIENT)
Dept: OUTPATIENT SERVICES | Facility: HOSPITAL | Age: 44
LOS: 1 days | Discharge: HOME | End: 2019-09-07

## 2019-09-07 DIAGNOSIS — Z95.2 PRESENCE OF PROSTHETIC HEART VALVE: Chronic | ICD-10-CM

## 2019-09-07 DIAGNOSIS — Z95.2 PRESENCE OF PROSTHETIC HEART VALVE: ICD-10-CM

## 2019-09-07 DIAGNOSIS — Z98.890 OTHER SPECIFIED POSTPROCEDURAL STATES: Chronic | ICD-10-CM

## 2019-09-07 DIAGNOSIS — Z79.01 LONG TERM (CURRENT) USE OF ANTICOAGULANTS: ICD-10-CM

## 2019-09-07 LAB
POCT INR: 2.7 RATIO — HIGH (ref 0.9–1.2)
POCT PT: 32 SEC — HIGH (ref 10–13.4)

## 2019-09-21 ENCOUNTER — OUTPATIENT (OUTPATIENT)
Dept: OUTPATIENT SERVICES | Facility: HOSPITAL | Age: 44
LOS: 1 days | Discharge: HOME | End: 2019-09-21

## 2019-09-21 DIAGNOSIS — Z79.01 LONG TERM (CURRENT) USE OF ANTICOAGULANTS: ICD-10-CM

## 2019-09-21 DIAGNOSIS — Z95.2 PRESENCE OF PROSTHETIC HEART VALVE: Chronic | ICD-10-CM

## 2019-09-21 DIAGNOSIS — Z98.890 OTHER SPECIFIED POSTPROCEDURAL STATES: Chronic | ICD-10-CM

## 2019-09-21 DIAGNOSIS — Z95.2 PRESENCE OF PROSTHETIC HEART VALVE: ICD-10-CM

## 2019-09-21 LAB
POCT INR: 3 RATIO — HIGH (ref 0.9–1.2)
POCT PT: 36.5 SEC — HIGH (ref 10–13.4)

## 2019-10-05 ENCOUNTER — OUTPATIENT (OUTPATIENT)
Dept: OUTPATIENT SERVICES | Facility: HOSPITAL | Age: 44
LOS: 1 days | Discharge: HOME | End: 2019-10-05

## 2019-10-05 DIAGNOSIS — Z95.2 PRESENCE OF PROSTHETIC HEART VALVE: Chronic | ICD-10-CM

## 2019-10-05 DIAGNOSIS — Z79.01 LONG TERM (CURRENT) USE OF ANTICOAGULANTS: ICD-10-CM

## 2019-10-05 DIAGNOSIS — Z98.890 OTHER SPECIFIED POSTPROCEDURAL STATES: Chronic | ICD-10-CM

## 2019-10-05 DIAGNOSIS — Z95.2 PRESENCE OF PROSTHETIC HEART VALVE: ICD-10-CM

## 2019-10-05 LAB
POCT INR: 2.5 RATIO — HIGH (ref 0.9–1.2)
POCT PT: 29.9 SEC — HIGH (ref 10–13.4)

## 2019-10-19 ENCOUNTER — OUTPATIENT (OUTPATIENT)
Dept: OUTPATIENT SERVICES | Facility: HOSPITAL | Age: 44
LOS: 1 days | Discharge: HOME | End: 2019-10-19

## 2019-10-19 DIAGNOSIS — Z98.890 OTHER SPECIFIED POSTPROCEDURAL STATES: Chronic | ICD-10-CM

## 2019-10-19 DIAGNOSIS — Z95.2 PRESENCE OF PROSTHETIC HEART VALVE: Chronic | ICD-10-CM

## 2019-10-19 DIAGNOSIS — Z79.01 LONG TERM (CURRENT) USE OF ANTICOAGULANTS: ICD-10-CM

## 2019-10-19 DIAGNOSIS — Z95.2 PRESENCE OF PROSTHETIC HEART VALVE: ICD-10-CM

## 2019-10-19 LAB
POCT INR: 3.3 RATIO — HIGH (ref 0.9–1.2)
POCT PT: 39.6 SEC — HIGH (ref 10–13.4)

## 2019-10-23 ENCOUNTER — APPOINTMENT (OUTPATIENT)
Dept: CARDIOLOGY | Facility: CLINIC | Age: 44
End: 2019-10-23
Payer: COMMERCIAL

## 2019-10-23 PROCEDURE — 93295 DEV INTERROG REMOTE 1/2/MLT: CPT

## 2019-10-23 PROCEDURE — 93296 REM INTERROG EVL PM/IDS: CPT

## 2019-11-02 ENCOUNTER — OUTPATIENT (OUTPATIENT)
Dept: OUTPATIENT SERVICES | Facility: HOSPITAL | Age: 44
LOS: 1 days | Discharge: HOME | End: 2019-11-02

## 2019-11-02 DIAGNOSIS — Z95.2 PRESENCE OF PROSTHETIC HEART VALVE: ICD-10-CM

## 2019-11-02 DIAGNOSIS — Z98.890 OTHER SPECIFIED POSTPROCEDURAL STATES: Chronic | ICD-10-CM

## 2019-11-02 DIAGNOSIS — Z79.01 LONG TERM (CURRENT) USE OF ANTICOAGULANTS: ICD-10-CM

## 2019-11-02 DIAGNOSIS — Z95.2 PRESENCE OF PROSTHETIC HEART VALVE: Chronic | ICD-10-CM

## 2019-11-02 LAB
POCT INR: 2.1 RATIO — HIGH (ref 0.9–1.2)
POCT PT: 25.3 SEC — HIGH (ref 10–13.4)

## 2019-11-16 ENCOUNTER — OUTPATIENT (OUTPATIENT)
Dept: OUTPATIENT SERVICES | Facility: HOSPITAL | Age: 44
LOS: 1 days | Discharge: HOME | End: 2019-11-16

## 2019-11-16 DIAGNOSIS — Z98.890 OTHER SPECIFIED POSTPROCEDURAL STATES: Chronic | ICD-10-CM

## 2019-11-16 DIAGNOSIS — Z95.2 PRESENCE OF PROSTHETIC HEART VALVE: ICD-10-CM

## 2019-11-16 DIAGNOSIS — Z95.2 PRESENCE OF PROSTHETIC HEART VALVE: Chronic | ICD-10-CM

## 2019-11-16 DIAGNOSIS — Z79.01 LONG TERM (CURRENT) USE OF ANTICOAGULANTS: ICD-10-CM

## 2019-11-16 LAB
POCT INR: 2 RATIO — HIGH (ref 0.9–1.2)
POCT PT: 23.6 SEC — HIGH (ref 10–13.4)

## 2019-11-25 NOTE — PATIENT PROFILE ADULT. - AS SC BRADEN ACTIVITY
Quality 110: Preventive Care And Screening: Influenza Immunization: Influenza Immunization Administered during Influenza season Quality 130: Documentation Of Current Medications In The Medical Record: Current Medications Documented Quality 131: Pain Assessment And Follow-Up: Pain assessment NOT documented as being performed, documentation the patient is not eligible for a pain assessment using a standardized tool Detail Level: Detailed (3) walks occasionally

## 2019-11-30 ENCOUNTER — OUTPATIENT (OUTPATIENT)
Dept: OUTPATIENT SERVICES | Facility: HOSPITAL | Age: 44
LOS: 1 days | Discharge: HOME | End: 2019-11-30

## 2019-11-30 DIAGNOSIS — Z79.01 LONG TERM (CURRENT) USE OF ANTICOAGULANTS: ICD-10-CM

## 2019-11-30 DIAGNOSIS — Z95.2 PRESENCE OF PROSTHETIC HEART VALVE: ICD-10-CM

## 2019-11-30 DIAGNOSIS — Z98.890 OTHER SPECIFIED POSTPROCEDURAL STATES: Chronic | ICD-10-CM

## 2019-11-30 DIAGNOSIS — Z95.2 PRESENCE OF PROSTHETIC HEART VALVE: Chronic | ICD-10-CM

## 2019-11-30 LAB
POCT INR: 3.7 RATIO — HIGH (ref 0.9–1.2)
POCT PT: 44.2 SEC — HIGH (ref 10–13.4)

## 2019-12-14 ENCOUNTER — OUTPATIENT (OUTPATIENT)
Dept: OUTPATIENT SERVICES | Facility: HOSPITAL | Age: 44
LOS: 1 days | Discharge: HOME | End: 2019-12-14

## 2019-12-14 DIAGNOSIS — Z98.890 OTHER SPECIFIED POSTPROCEDURAL STATES: Chronic | ICD-10-CM

## 2019-12-14 DIAGNOSIS — Z95.2 PRESENCE OF PROSTHETIC HEART VALVE: Chronic | ICD-10-CM

## 2019-12-14 DIAGNOSIS — Z95.2 PRESENCE OF PROSTHETIC HEART VALVE: ICD-10-CM

## 2019-12-14 DIAGNOSIS — Z79.01 LONG TERM (CURRENT) USE OF ANTICOAGULANTS: ICD-10-CM

## 2019-12-14 LAB
POCT INR: 3.5 RATIO — HIGH (ref 0.9–1.2)
POCT PT: 41.6 SEC — HIGH (ref 10–13.4)

## 2019-12-17 ENCOUNTER — TRANSCRIPTION ENCOUNTER (OUTPATIENT)
Age: 44
End: 2019-12-17

## 2019-12-28 ENCOUNTER — OUTPATIENT (OUTPATIENT)
Dept: OUTPATIENT SERVICES | Facility: HOSPITAL | Age: 44
LOS: 1 days | Discharge: HOME | End: 2019-12-28

## 2019-12-28 DIAGNOSIS — Z95.2 PRESENCE OF PROSTHETIC HEART VALVE: Chronic | ICD-10-CM

## 2019-12-28 DIAGNOSIS — Z95.2 PRESENCE OF PROSTHETIC HEART VALVE: ICD-10-CM

## 2019-12-28 DIAGNOSIS — Z79.01 LONG TERM (CURRENT) USE OF ANTICOAGULANTS: ICD-10-CM

## 2019-12-28 DIAGNOSIS — Z98.890 OTHER SPECIFIED POSTPROCEDURAL STATES: Chronic | ICD-10-CM

## 2019-12-28 LAB
POCT INR: 4.4 RATIO — HIGH (ref 0.9–1.2)
POCT PT: 52.3 SEC — HIGH (ref 10–13.4)

## 2020-01-11 ENCOUNTER — OUTPATIENT (OUTPATIENT)
Dept: OUTPATIENT SERVICES | Facility: HOSPITAL | Age: 45
LOS: 1 days | Discharge: HOME | End: 2020-01-11

## 2020-01-11 DIAGNOSIS — Z95.2 PRESENCE OF PROSTHETIC HEART VALVE: Chronic | ICD-10-CM

## 2020-01-11 DIAGNOSIS — Z98.890 OTHER SPECIFIED POSTPROCEDURAL STATES: Chronic | ICD-10-CM

## 2020-01-11 DIAGNOSIS — Z79.01 LONG TERM (CURRENT) USE OF ANTICOAGULANTS: ICD-10-CM

## 2020-01-11 DIAGNOSIS — Z95.2 PRESENCE OF PROSTHETIC HEART VALVE: ICD-10-CM

## 2020-01-11 LAB
POCT INR: 3.3 RATIO — HIGH (ref 0.9–1.2)
POCT PT: 39.2 SEC — HIGH (ref 10–13.4)

## 2020-01-22 ENCOUNTER — APPOINTMENT (OUTPATIENT)
Dept: CARDIOLOGY | Facility: CLINIC | Age: 45
End: 2020-01-22
Payer: COMMERCIAL

## 2020-01-22 PROCEDURE — 93295 DEV INTERROG REMOTE 1/2/MLT: CPT

## 2020-01-22 PROCEDURE — 93296 REM INTERROG EVL PM/IDS: CPT

## 2020-01-25 ENCOUNTER — OUTPATIENT (OUTPATIENT)
Dept: OUTPATIENT SERVICES | Facility: HOSPITAL | Age: 45
LOS: 1 days | Discharge: HOME | End: 2020-01-25

## 2020-01-25 DIAGNOSIS — Z95.2 PRESENCE OF PROSTHETIC HEART VALVE: ICD-10-CM

## 2020-01-25 DIAGNOSIS — Z79.01 LONG TERM (CURRENT) USE OF ANTICOAGULANTS: ICD-10-CM

## 2020-01-25 DIAGNOSIS — Z95.2 PRESENCE OF PROSTHETIC HEART VALVE: Chronic | ICD-10-CM

## 2020-01-25 DIAGNOSIS — Z98.890 OTHER SPECIFIED POSTPROCEDURAL STATES: Chronic | ICD-10-CM

## 2020-01-25 LAB
POCT INR: 3.3 RATIO — HIGH (ref 0.9–1.2)
POCT PT: 40.2 SEC — HIGH (ref 10–13.4)

## 2020-02-15 ENCOUNTER — OUTPATIENT (OUTPATIENT)
Dept: OUTPATIENT SERVICES | Facility: HOSPITAL | Age: 45
LOS: 1 days | Discharge: HOME | End: 2020-02-15

## 2020-02-15 DIAGNOSIS — Z98.890 OTHER SPECIFIED POSTPROCEDURAL STATES: Chronic | ICD-10-CM

## 2020-02-15 DIAGNOSIS — Z95.2 PRESENCE OF PROSTHETIC HEART VALVE: ICD-10-CM

## 2020-02-15 DIAGNOSIS — Z95.2 PRESENCE OF PROSTHETIC HEART VALVE: Chronic | ICD-10-CM

## 2020-02-15 DIAGNOSIS — Z79.01 LONG TERM (CURRENT) USE OF ANTICOAGULANTS: ICD-10-CM

## 2020-02-15 LAB
POCT INR: 2.5 RATIO — HIGH (ref 0.9–1.2)
POCT PT: 30.4 SEC — HIGH (ref 10–13.4)

## 2020-02-22 ENCOUNTER — OUTPATIENT (OUTPATIENT)
Dept: OUTPATIENT SERVICES | Facility: HOSPITAL | Age: 45
LOS: 1 days | Discharge: HOME | End: 2020-02-22

## 2020-02-22 DIAGNOSIS — Z98.890 OTHER SPECIFIED POSTPROCEDURAL STATES: Chronic | ICD-10-CM

## 2020-02-22 DIAGNOSIS — Z95.2 PRESENCE OF PROSTHETIC HEART VALVE: Chronic | ICD-10-CM

## 2020-02-22 DIAGNOSIS — Z95.2 PRESENCE OF PROSTHETIC HEART VALVE: ICD-10-CM

## 2020-02-22 DIAGNOSIS — Z79.01 LONG TERM (CURRENT) USE OF ANTICOAGULANTS: ICD-10-CM

## 2020-02-22 LAB
POCT INR: 3 RATIO — HIGH (ref 0.9–1.2)
POCT PT: 36.5 SEC — HIGH (ref 10–13.4)

## 2020-03-07 ENCOUNTER — OUTPATIENT (OUTPATIENT)
Dept: OUTPATIENT SERVICES | Facility: HOSPITAL | Age: 45
LOS: 1 days | Discharge: HOME | End: 2020-03-07

## 2020-03-07 DIAGNOSIS — Z98.890 OTHER SPECIFIED POSTPROCEDURAL STATES: Chronic | ICD-10-CM

## 2020-03-07 DIAGNOSIS — Z79.01 LONG TERM (CURRENT) USE OF ANTICOAGULANTS: ICD-10-CM

## 2020-03-07 DIAGNOSIS — Z95.2 PRESENCE OF PROSTHETIC HEART VALVE: Chronic | ICD-10-CM

## 2020-03-07 DIAGNOSIS — Z95.2 PRESENCE OF PROSTHETIC HEART VALVE: ICD-10-CM

## 2020-03-07 LAB
POCT INR: 2.6 RATIO — HIGH (ref 0.9–1.2)
POCT PT: 31.3 SEC — HIGH (ref 10–13.4)

## 2020-03-21 ENCOUNTER — OUTPATIENT (OUTPATIENT)
Dept: OUTPATIENT SERVICES | Facility: HOSPITAL | Age: 45
LOS: 1 days | Discharge: HOME | End: 2020-03-21

## 2020-03-21 DIAGNOSIS — Z95.2 PRESENCE OF PROSTHETIC HEART VALVE: Chronic | ICD-10-CM

## 2020-03-21 DIAGNOSIS — Z95.2 PRESENCE OF PROSTHETIC HEART VALVE: ICD-10-CM

## 2020-03-21 DIAGNOSIS — Z98.890 OTHER SPECIFIED POSTPROCEDURAL STATES: Chronic | ICD-10-CM

## 2020-03-21 DIAGNOSIS — Z79.01 LONG TERM (CURRENT) USE OF ANTICOAGULANTS: ICD-10-CM

## 2020-03-21 LAB
POCT INR: 3 RATIO — HIGH (ref 0.9–1.2)
POCT PT: 35.8 SEC — HIGH (ref 10–13.4)

## 2020-04-24 ENCOUNTER — OUTPATIENT (OUTPATIENT)
Dept: OUTPATIENT SERVICES | Facility: HOSPITAL | Age: 45
LOS: 1 days | Discharge: HOME | End: 2020-04-24

## 2020-04-24 DIAGNOSIS — Z95.2 PRESENCE OF PROSTHETIC HEART VALVE: ICD-10-CM

## 2020-04-24 DIAGNOSIS — Z95.2 PRESENCE OF PROSTHETIC HEART VALVE: Chronic | ICD-10-CM

## 2020-04-24 DIAGNOSIS — Z79.01 LONG TERM (CURRENT) USE OF ANTICOAGULANTS: ICD-10-CM

## 2020-04-24 DIAGNOSIS — Z98.890 OTHER SPECIFIED POSTPROCEDURAL STATES: Chronic | ICD-10-CM

## 2020-04-24 LAB
INR PPP: 4.2 RATIO
POCT-PROTHROMBIN TIME: 50 SECS
QUALITY CONTROL: YES

## 2020-04-27 ENCOUNTER — APPOINTMENT (OUTPATIENT)
Dept: CARDIOLOGY | Facility: CLINIC | Age: 45
End: 2020-04-27
Payer: COMMERCIAL

## 2020-04-27 PROCEDURE — 93295 DEV INTERROG REMOTE 1/2/MLT: CPT

## 2020-04-27 PROCEDURE — 93296 REM INTERROG EVL PM/IDS: CPT

## 2020-04-29 NOTE — H&P PST ADULT - HEIGHT IN FEET
CT abdomen and pelvis without contrast:

 

Reason for examination: Abnormal acute abdominal x-ray.

 

Comparison is made to previous examinations dated 4/22/2020 and 4/2/2020.

 

Helical images were obtained through the abdomen and pelvis with no 

contrast administered. Reconstruction was performed in sagittal and 

coronal planes.

 

Exposure: One or more of the following individualized dose reduction 

techniques were utilized for this examination:  1. Automated exposure 

control  2. Adjustment of the mA and/or kV according to patient size  3. 

Use of iterative reconstruction technique.

 

There is a calcified granuloma in the left lung base. There is some linear

atelectasis in the left lower lobe and right middle lobe. The heart size 

appears be enlarged with no pericardial effusion evident.

 

The liver and spleen are both mildly enlarged but show no focal lesions. 

No abnormalities of seen at the adrenal glands, pancreas or gallbladder. 

The abdominal aorta shows some arteriosclerotic vascular calcification but

no aneurysmal dilatation. No abnormality seen at the inferior vena cava. 

There is no diverticulosis or diverticulitis or colitis. There is some 

residual contrast in the distal colon. There is no evidence of 

appendicitis. The small intestinal tract shows no abnormal dilatation, 

wall thickening or evidence of obstruction. The stomach is not distended 

and shows no wall thickening or obstruction. The kidneys show 3 hypodense 

lesions in the right kidney with the largest at the midpole measuring 

approximately 2.7 cm in greatest dimension. These are unchanged and likely

represent cysts. No renal calculi, hydronephrosis or obstructive uropathy 

is evident.

 

Bladder is well-distended and continues to show a bladder diverticulum on 

the right measuring approximately 4.3 x 4.2 x 2.5 cm in greatest 

dimensions. No abnormality seen at the prostate gland aside from 

prosthetic calcification. Seminal vesicles are symmetric. As the reference

calcifications are seen. There is a thoracolumbar scoliosis with thoracic 

convexly to the right and lumbar convexity to the left. There continue to 

be erosive changes at the inferior endplate of T11 and superior endplate 

of T12 on the left which show a mild increase in the bony destruction. 

This is associated with some soft tissue thickening in the paraspinous 

region which appears to be stable. There is a Schmorl's node in the 

inferior endplate of the L2 vertebral body which is unchanged. There are 

degenerative disc changes at the L1-2,, L2-3, L3-4, L4-5 and L5-S1 disc 

levels with vacuum disc phenomena

 

IMPRESSION:

 

Linear atelectasis at the left lower lobe and right middle lobe. 

Cardiomegaly. Mild hepatosplenomegaly. Hypodense lesions probably 

representing cysts in the right kidney which are stable. No new erosive 

changes at the inferior endplate of T11 and superior endplate of T12 on 

the left which appear to have increased and there continues be associated 

paraspinous soft tissue thickening which is unchanged. Thoracolumbar 

scoliosis with degenerative disc disease. Right-sided bladder diverticulum

measuring 4.3 cm in greatest dimension.

 

Electronically signed by: Bernie Beyer MD (4/29/2020 4:57 PM) [facility]

## 2020-05-01 ENCOUNTER — OUTPATIENT (OUTPATIENT)
Dept: OUTPATIENT SERVICES | Facility: HOSPITAL | Age: 45
LOS: 1 days | Discharge: HOME | End: 2020-05-01

## 2020-05-01 DIAGNOSIS — Z98.890 OTHER SPECIFIED POSTPROCEDURAL STATES: Chronic | ICD-10-CM

## 2020-05-01 DIAGNOSIS — Z79.01 LONG TERM (CURRENT) USE OF ANTICOAGULANTS: ICD-10-CM

## 2020-05-01 DIAGNOSIS — Z95.2 PRESENCE OF PROSTHETIC HEART VALVE: ICD-10-CM

## 2020-05-01 DIAGNOSIS — Z95.2 PRESENCE OF PROSTHETIC HEART VALVE: Chronic | ICD-10-CM

## 2020-05-01 LAB
POCT INR: 3.5 RATIO — HIGH (ref 0.9–1.2)
POCT PT: 42 SEC — HIGH (ref 10–13.4)

## 2020-05-04 LAB
INR PPP: 3.5 RATIO
POCT-PROTHROMBIN TIME: 42 SECS

## 2020-05-15 ENCOUNTER — OUTPATIENT (OUTPATIENT)
Dept: OUTPATIENT SERVICES | Facility: HOSPITAL | Age: 45
LOS: 1 days | Discharge: HOME | End: 2020-05-15

## 2020-05-15 DIAGNOSIS — Z98.890 OTHER SPECIFIED POSTPROCEDURAL STATES: Chronic | ICD-10-CM

## 2020-05-15 DIAGNOSIS — Z95.2 PRESENCE OF PROSTHETIC HEART VALVE: Chronic | ICD-10-CM

## 2020-05-15 DIAGNOSIS — Z79.01 LONG TERM (CURRENT) USE OF ANTICOAGULANTS: ICD-10-CM

## 2020-05-15 DIAGNOSIS — Z95.2 PRESENCE OF PROSTHETIC HEART VALVE: ICD-10-CM

## 2020-05-15 LAB
POCT INR: 3 RATIO — HIGH (ref 0.9–1.2)
POCT PT: 36.4 SEC — HIGH (ref 10–13.4)

## 2020-06-01 ENCOUNTER — APPOINTMENT (OUTPATIENT)
Dept: CARDIOLOGY | Facility: CLINIC | Age: 45
End: 2020-06-01
Payer: COMMERCIAL

## 2020-06-01 ENCOUNTER — APPOINTMENT (OUTPATIENT)
Dept: CARDIOLOGY | Facility: CLINIC | Age: 45
End: 2020-06-01

## 2020-06-01 VITALS — BODY MASS INDEX: 31.34 KG/M2 | WEIGHT: 195 LBS | HEIGHT: 66 IN

## 2020-06-01 DIAGNOSIS — Z78.9 OTHER SPECIFIED HEALTH STATUS: ICD-10-CM

## 2020-06-01 PROCEDURE — 99213 OFFICE O/P EST LOW 20 MIN: CPT | Mod: 95

## 2020-06-01 NOTE — ASSESSMENT
[FreeTextEntry1] : Mr. Wooten presents for routine follow up of subcutaneous ICD, which was implanted for primary prevention for dilated cardiomyopathy. From a cardiovascular standpoint he has been doing well. He denies any cardiovascular complaints. He takes coumadin for his mechanical aortic valve and denies any signs/symptoms of bleeding. \par \par He is enrolled in remote monitoring and is transmitting appropriately. We reviewed his remote transmissions. No ICD shocks. I have asked him to continue his present meds and to follow up in 9 months. I have also advised the patient to go to the nearest emergency room if he experiences any chest pain, dyspnea, syncope, or has any other compelling symptoms.\par

## 2020-06-01 NOTE — PHYSICAL EXAM
[General Appearance - Well Developed] : well developed [Normal Appearance] : normal appearance [Well Groomed] : well groomed [General Appearance - Well Nourished] : well nourished [No Deformities] : no deformities [General Appearance - In No Acute Distress] : no acute distress [Exaggerated Use Of Accessory Muscles For Inspiration] : no accessory muscle use [] : no respiratory distress

## 2020-06-01 NOTE — REVIEW OF SYSTEMS
[see HPI] : see HPI [Recent Weight Gain (___ Lbs)] : recent [unfilled] ~Ulb weight gain [Negative] : Heme/Lymph

## 2020-06-01 NOTE — HISTORY OF PRESENT ILLNESS
[de-identified] : Mr. JANIS KING has given me verbal authorization to provide tele services.\par Verbal consent given on 06/01/2020  by the patient.\par \par This visit was provided via telehealth using real-time 2-way audio visual technology. The patient,  Mr. JANIS KING, was located at work at the time of the visit. \par \par The patient, Mr. JANIS KING, and Provider participated in the telehealth encounter.\par \par Cardiologist: Dr. Saravia \par \par 45 yo M, former , with history of infective endocarditis after tooth infection s/p mechanical aortic and and aortic valve replacement on coumadin (INR 2.5-3.5) in July 2017 at [facility] with an admission to [facility] in July with acute systolic heart failure exacerbation. Patient states that was the first time he was ever informed of cardiomyopathy. He was experiencing heart failure symptoms including dyspnea at rest and PND. He was prescribed Entresto but couldn't afford it due to insurance issues. Since discharge, he took his meds as prescribed. He wore the LifeVest and is now s/p subcutaneous ICD. He has a remote monitor set up. He has not been shocked by his device.\par \par Since his last visit he lost another 10 lbs by following the keto diet. He denies chest pain, dyspnea, dizziness, lightheadedness, presyncope or syncope. No PND or lower extremity edema. Stable 2 pillow orthopnea.

## 2020-06-05 ENCOUNTER — OUTPATIENT (OUTPATIENT)
Dept: OUTPATIENT SERVICES | Facility: HOSPITAL | Age: 45
LOS: 1 days | Discharge: HOME | End: 2020-06-05

## 2020-06-05 DIAGNOSIS — Z95.2 PRESENCE OF PROSTHETIC HEART VALVE: ICD-10-CM

## 2020-06-05 DIAGNOSIS — Z79.01 LONG TERM (CURRENT) USE OF ANTICOAGULANTS: ICD-10-CM

## 2020-06-05 DIAGNOSIS — Z95.2 PRESENCE OF PROSTHETIC HEART VALVE: Chronic | ICD-10-CM

## 2020-06-05 DIAGNOSIS — Z98.890 OTHER SPECIFIED POSTPROCEDURAL STATES: Chronic | ICD-10-CM

## 2020-06-05 LAB
INR PPP: 3.2 RATIO
POCT-PROTHROMBIN TIME: 38.4 SECS
QUALITY CONTROL: YES

## 2020-06-26 ENCOUNTER — OUTPATIENT (OUTPATIENT)
Dept: OUTPATIENT SERVICES | Facility: HOSPITAL | Age: 45
LOS: 1 days | Discharge: HOME | End: 2020-06-26

## 2020-06-26 DIAGNOSIS — Z95.2 PRESENCE OF PROSTHETIC HEART VALVE: Chronic | ICD-10-CM

## 2020-06-26 DIAGNOSIS — Z98.890 OTHER SPECIFIED POSTPROCEDURAL STATES: Chronic | ICD-10-CM

## 2020-06-26 DIAGNOSIS — Z79.01 LONG TERM (CURRENT) USE OF ANTICOAGULANTS: ICD-10-CM

## 2020-06-26 DIAGNOSIS — Z95.2 PRESENCE OF PROSTHETIC HEART VALVE: ICD-10-CM

## 2020-06-26 LAB
INR PPP: 2.9 RATIO
POCT INR: 2.9 RATIO — HIGH (ref 0.9–1.2)
POCT PT: 34.8 SEC — HIGH (ref 10–13.4)
POCT-PROTHROMBIN TIME: 34.8 SECS

## 2020-07-24 ENCOUNTER — OUTPATIENT (OUTPATIENT)
Dept: OUTPATIENT SERVICES | Facility: HOSPITAL | Age: 45
LOS: 1 days | Discharge: HOME | End: 2020-07-24

## 2020-07-24 DIAGNOSIS — Z95.2 PRESENCE OF PROSTHETIC HEART VALVE: ICD-10-CM

## 2020-07-24 DIAGNOSIS — Z98.890 OTHER SPECIFIED POSTPROCEDURAL STATES: Chronic | ICD-10-CM

## 2020-07-24 DIAGNOSIS — Z95.2 PRESENCE OF PROSTHETIC HEART VALVE: Chronic | ICD-10-CM

## 2020-07-24 DIAGNOSIS — Z79.01 LONG TERM (CURRENT) USE OF ANTICOAGULANTS: ICD-10-CM

## 2020-07-24 LAB
POCT INR: 3.4 RATIO — HIGH (ref 0.9–1.2)
POCT PT: 41 SEC — HIGH (ref 10–13.4)

## 2020-07-29 ENCOUNTER — APPOINTMENT (OUTPATIENT)
Dept: CARDIOLOGY | Facility: CLINIC | Age: 45
End: 2020-07-29
Payer: COMMERCIAL

## 2020-07-29 PROCEDURE — 93295 DEV INTERROG REMOTE 1/2/MLT: CPT

## 2020-07-29 PROCEDURE — 93296 REM INTERROG EVL PM/IDS: CPT

## 2020-08-06 ENCOUNTER — RECORD ABSTRACTING (OUTPATIENT)
Age: 45
End: 2020-08-06

## 2020-08-21 ENCOUNTER — APPOINTMENT (OUTPATIENT)
Dept: MEDICATION MANAGEMENT | Facility: CLINIC | Age: 45
End: 2020-08-21

## 2020-08-21 ENCOUNTER — OUTPATIENT (OUTPATIENT)
Dept: OUTPATIENT SERVICES | Facility: HOSPITAL | Age: 45
LOS: 1 days | Discharge: HOME | End: 2020-08-21

## 2020-08-21 VITALS — HEART RATE: 76 BPM | OXYGEN SATURATION: 99 % | RESPIRATION RATE: 16 BRPM

## 2020-08-21 DIAGNOSIS — Z95.2 PRESENCE OF PROSTHETIC HEART VALVE: ICD-10-CM

## 2020-08-21 DIAGNOSIS — Z98.890 OTHER SPECIFIED POSTPROCEDURAL STATES: Chronic | ICD-10-CM

## 2020-08-21 DIAGNOSIS — Z79.01 LONG TERM (CURRENT) USE OF ANTICOAGULANTS: ICD-10-CM

## 2020-08-21 DIAGNOSIS — Z95.2 PRESENCE OF PROSTHETIC HEART VALVE: Chronic | ICD-10-CM

## 2020-08-21 LAB
INR PPP: 3.1 RATIO
POCT-PROTHROMBIN TIME: 37.7 SECS
QUALITY CONTROL: YES

## 2020-09-18 ENCOUNTER — OUTPATIENT (OUTPATIENT)
Dept: OUTPATIENT SERVICES | Facility: HOSPITAL | Age: 45
LOS: 1 days | Discharge: HOME | End: 2020-09-18

## 2020-09-18 ENCOUNTER — APPOINTMENT (OUTPATIENT)
Dept: MEDICATION MANAGEMENT | Facility: CLINIC | Age: 45
End: 2020-09-18

## 2020-09-18 VITALS — OXYGEN SATURATION: 99 % | HEIGHT: 66 IN | BODY MASS INDEX: 31.34 KG/M2 | WEIGHT: 195 LBS

## 2020-09-18 DIAGNOSIS — Z98.890 OTHER SPECIFIED POSTPROCEDURAL STATES: Chronic | ICD-10-CM

## 2020-09-18 DIAGNOSIS — Z95.2 PRESENCE OF PROSTHETIC HEART VALVE: ICD-10-CM

## 2020-09-18 DIAGNOSIS — Z79.01 LONG TERM (CURRENT) USE OF ANTICOAGULANTS: ICD-10-CM

## 2020-09-18 DIAGNOSIS — Z95.2 PRESENCE OF PROSTHETIC HEART VALVE: Chronic | ICD-10-CM

## 2020-09-18 LAB
INR PPP: 3 RATIO
POCT-PROTHROMBIN TIME: 35.5 SECS
QUALITY CONTROL: YES

## 2020-10-16 ENCOUNTER — APPOINTMENT (OUTPATIENT)
Dept: MEDICATION MANAGEMENT | Facility: CLINIC | Age: 45
End: 2020-10-16

## 2020-10-16 ENCOUNTER — OUTPATIENT (OUTPATIENT)
Dept: OUTPATIENT SERVICES | Facility: HOSPITAL | Age: 45
LOS: 1 days | Discharge: HOME | End: 2020-10-16

## 2020-10-16 VITALS — OXYGEN SATURATION: 99 % | HEIGHT: 66 IN | WEIGHT: 195 LBS | BODY MASS INDEX: 31.34 KG/M2

## 2020-10-16 DIAGNOSIS — Z98.890 OTHER SPECIFIED POSTPROCEDURAL STATES: Chronic | ICD-10-CM

## 2020-10-16 DIAGNOSIS — Z95.2 PRESENCE OF PROSTHETIC HEART VALVE: ICD-10-CM

## 2020-10-16 DIAGNOSIS — Z95.2 PRESENCE OF PROSTHETIC HEART VALVE: Chronic | ICD-10-CM

## 2020-10-16 DIAGNOSIS — Z79.01 LONG TERM (CURRENT) USE OF ANTICOAGULANTS: ICD-10-CM

## 2020-10-16 LAB
INR PPP: 2.5 RATIO
POCT-PROTHROMBIN TIME: 30.1 SECS
QUALITY CONTROL: YES

## 2020-10-28 ENCOUNTER — APPOINTMENT (OUTPATIENT)
Dept: CARDIOLOGY | Facility: CLINIC | Age: 45
End: 2020-10-28
Payer: COMMERCIAL

## 2020-10-28 PROCEDURE — 93296 REM INTERROG EVL PM/IDS: CPT

## 2020-10-28 PROCEDURE — 93295 DEV INTERROG REMOTE 1/2/MLT: CPT

## 2020-11-13 ENCOUNTER — APPOINTMENT (OUTPATIENT)
Dept: MEDICATION MANAGEMENT | Facility: CLINIC | Age: 45
End: 2020-11-13

## 2020-11-13 ENCOUNTER — OUTPATIENT (OUTPATIENT)
Dept: OUTPATIENT SERVICES | Facility: HOSPITAL | Age: 45
LOS: 1 days | Discharge: HOME | End: 2020-11-13

## 2020-11-13 DIAGNOSIS — Z98.890 OTHER SPECIFIED POSTPROCEDURAL STATES: Chronic | ICD-10-CM

## 2020-11-13 DIAGNOSIS — Z95.2 PRESENCE OF PROSTHETIC HEART VALVE: Chronic | ICD-10-CM

## 2020-11-13 DIAGNOSIS — Z79.01 LONG TERM (CURRENT) USE OF ANTICOAGULANTS: ICD-10-CM

## 2020-11-13 DIAGNOSIS — Z95.2 PRESENCE OF PROSTHETIC HEART VALVE: ICD-10-CM

## 2020-11-13 LAB
INR PPP: 2.3 RATIO
POCT-PROTHROMBIN TIME: 28.8 SECS
QUALITY CONTROL: YES

## 2020-12-11 ENCOUNTER — APPOINTMENT (OUTPATIENT)
Dept: MEDICATION MANAGEMENT | Facility: CLINIC | Age: 45
End: 2020-12-11

## 2020-12-11 ENCOUNTER — OUTPATIENT (OUTPATIENT)
Dept: OUTPATIENT SERVICES | Facility: HOSPITAL | Age: 45
LOS: 1 days | Discharge: HOME | End: 2020-12-11

## 2020-12-11 VITALS — RESPIRATION RATE: 16 BRPM

## 2020-12-11 DIAGNOSIS — Z79.01 LONG TERM (CURRENT) USE OF ANTICOAGULANTS: ICD-10-CM

## 2020-12-11 DIAGNOSIS — Z98.890 OTHER SPECIFIED POSTPROCEDURAL STATES: Chronic | ICD-10-CM

## 2020-12-11 DIAGNOSIS — Z95.2 PRESENCE OF PROSTHETIC HEART VALVE: ICD-10-CM

## 2020-12-11 DIAGNOSIS — Z95.2 PRESENCE OF PROSTHETIC HEART VALVE: Chronic | ICD-10-CM

## 2020-12-11 LAB
INR PPP: 3.7 RATIO
POCT-PROTHROMBIN TIME: 44.9 SECS
QUALITY CONTROL: YES

## 2021-01-08 ENCOUNTER — APPOINTMENT (OUTPATIENT)
Dept: MEDICATION MANAGEMENT | Facility: CLINIC | Age: 46
End: 2021-01-08

## 2021-01-08 ENCOUNTER — OUTPATIENT (OUTPATIENT)
Dept: OUTPATIENT SERVICES | Facility: HOSPITAL | Age: 46
LOS: 1 days | Discharge: HOME | End: 2021-01-08

## 2021-01-08 DIAGNOSIS — Z95.2 PRESENCE OF PROSTHETIC HEART VALVE: Chronic | ICD-10-CM

## 2021-01-08 DIAGNOSIS — Z98.890 OTHER SPECIFIED POSTPROCEDURAL STATES: Chronic | ICD-10-CM

## 2021-01-08 DIAGNOSIS — Z95.2 PRESENCE OF PROSTHETIC HEART VALVE: ICD-10-CM

## 2021-01-08 DIAGNOSIS — Z79.01 LONG TERM (CURRENT) USE OF ANTICOAGULANTS: ICD-10-CM

## 2021-01-08 LAB
INR PPP: 3.7 RATIO
POCT-PROTHROMBIN TIME: 44.4 SECS
QUALITY CONTROL: YES

## 2021-01-27 ENCOUNTER — APPOINTMENT (OUTPATIENT)
Dept: CARDIOLOGY | Facility: CLINIC | Age: 46
End: 2021-01-27
Payer: COMMERCIAL

## 2021-01-27 PROCEDURE — 93296 REM INTERROG EVL PM/IDS: CPT

## 2021-01-27 PROCEDURE — 93295 DEV INTERROG REMOTE 1/2/MLT: CPT

## 2021-02-05 ENCOUNTER — APPOINTMENT (OUTPATIENT)
Dept: MEDICATION MANAGEMENT | Facility: CLINIC | Age: 46
End: 2021-02-05

## 2021-02-05 ENCOUNTER — OUTPATIENT (OUTPATIENT)
Dept: OUTPATIENT SERVICES | Facility: HOSPITAL | Age: 46
LOS: 1 days | Discharge: HOME | End: 2021-02-05

## 2021-02-05 DIAGNOSIS — Z98.890 OTHER SPECIFIED POSTPROCEDURAL STATES: Chronic | ICD-10-CM

## 2021-02-05 DIAGNOSIS — Z95.2 PRESENCE OF PROSTHETIC HEART VALVE: ICD-10-CM

## 2021-02-05 DIAGNOSIS — Z95.2 PRESENCE OF PROSTHETIC HEART VALVE: Chronic | ICD-10-CM

## 2021-02-05 DIAGNOSIS — Z79.01 LONG TERM (CURRENT) USE OF ANTICOAGULANTS: ICD-10-CM

## 2021-02-05 LAB
INR PPP: 2.9 RATIO
POCT-PROTHROMBIN TIME: 34.5 SECS
QUALITY CONTROL: YES

## 2021-03-03 ENCOUNTER — APPOINTMENT (OUTPATIENT)
Dept: CARDIOLOGY | Facility: CLINIC | Age: 46
End: 2021-03-03
Payer: COMMERCIAL

## 2021-03-03 VITALS
HEIGHT: 66 IN | SYSTOLIC BLOOD PRESSURE: 119 MMHG | WEIGHT: 187 LBS | BODY MASS INDEX: 30.05 KG/M2 | DIASTOLIC BLOOD PRESSURE: 80 MMHG | TEMPERATURE: 97.7 F | HEART RATE: 77 BPM

## 2021-03-03 PROCEDURE — 99072 ADDL SUPL MATRL&STAF TM PHE: CPT

## 2021-03-03 PROCEDURE — 99214 OFFICE O/P EST MOD 30 MIN: CPT

## 2021-03-03 PROCEDURE — 93000 ELECTROCARDIOGRAM COMPLETE: CPT | Mod: 59

## 2021-03-03 PROCEDURE — XXXXX: CPT

## 2021-03-03 PROCEDURE — 93260 PRGRMG DEV EVAL IMPLTBL SYS: CPT

## 2021-03-05 ENCOUNTER — APPOINTMENT (OUTPATIENT)
Dept: MEDICATION MANAGEMENT | Facility: CLINIC | Age: 46
End: 2021-03-05

## 2021-03-05 ENCOUNTER — OUTPATIENT (OUTPATIENT)
Dept: OUTPATIENT SERVICES | Facility: HOSPITAL | Age: 46
LOS: 1 days | Discharge: HOME | End: 2021-03-05

## 2021-03-05 VITALS — RESPIRATION RATE: 16 BRPM

## 2021-03-05 DIAGNOSIS — Z98.890 OTHER SPECIFIED POSTPROCEDURAL STATES: Chronic | ICD-10-CM

## 2021-03-05 DIAGNOSIS — Z95.2 PRESENCE OF PROSTHETIC HEART VALVE: Chronic | ICD-10-CM

## 2021-03-05 DIAGNOSIS — Z79.01 LONG TERM (CURRENT) USE OF ANTICOAGULANTS: ICD-10-CM

## 2021-03-05 DIAGNOSIS — Z95.2 PRESENCE OF PROSTHETIC HEART VALVE: ICD-10-CM

## 2021-03-05 LAB
INR PPP: 3 RATIO
POCT-PROTHROMBIN TIME: 36.1 SECS

## 2021-04-02 ENCOUNTER — APPOINTMENT (OUTPATIENT)
Dept: MEDICATION MANAGEMENT | Facility: CLINIC | Age: 46
End: 2021-04-02

## 2021-04-02 ENCOUNTER — OUTPATIENT (OUTPATIENT)
Dept: OUTPATIENT SERVICES | Facility: HOSPITAL | Age: 46
LOS: 1 days | Discharge: HOME | End: 2021-04-02

## 2021-04-02 DIAGNOSIS — Z79.01 LONG TERM (CURRENT) USE OF ANTICOAGULANTS: ICD-10-CM

## 2021-04-02 DIAGNOSIS — Z98.890 OTHER SPECIFIED POSTPROCEDURAL STATES: Chronic | ICD-10-CM

## 2021-04-02 DIAGNOSIS — Z95.2 PRESENCE OF PROSTHETIC HEART VALVE: ICD-10-CM

## 2021-04-02 DIAGNOSIS — Z95.2 PRESENCE OF PROSTHETIC HEART VALVE: Chronic | ICD-10-CM

## 2021-04-02 LAB
INR PPP: 3.3 RATIO
POCT-PROTHROMBIN TIME: 39.1 SECS
QUALITY CONTROL: YES

## 2021-04-07 NOTE — H&P ADULT - NSHPLABSRESULTS_GEN_ALL_CORE
no 14.9   10.18 )-----------( 199      ( 22 Jul 2018 05:35 )             47.3       07-22    143  |  101  |  26<H>  ----------------------------<  98  5.2<H>   |  29  |  1.4    Ca    8.8      22 Jul 2018 05:35    TPro  6.4  /  Alb  3.9  /  TBili  1.7<H>  /  DBili  x   /  AST  55<H>  /  ALT  55<H>  /  AlkPhos  70  07-21          PT/INR - ( 22 Jul 2018 05:35 )   PT: 36.70 sec;   INR: 3.32 ratio         PTT - ( 21 Jul 2018 18:10 )  PTT:36.7 sec        CARDIAC MARKERS ( 22 Jul 2018 05:35 )  x     / 0.04 ng/mL / 369 U/L / x     / 6.0 ng/mL  CARDIAC MARKERS ( 21 Jul 2018 22:06 )  x     / 0.05 ng/mL / 529 U/L / x     / 7.1 ng/mL  CARDIAC MARKERS ( 21 Jul 2018 18:10 )  x     / 0.04 ng/mL / 596 U/L / x     / 7.6 ng/mL

## 2021-04-30 ENCOUNTER — APPOINTMENT (OUTPATIENT)
Dept: MEDICATION MANAGEMENT | Facility: CLINIC | Age: 46
End: 2021-04-30

## 2021-04-30 ENCOUNTER — OUTPATIENT (OUTPATIENT)
Dept: OUTPATIENT SERVICES | Facility: HOSPITAL | Age: 46
LOS: 1 days | Discharge: HOME | End: 2021-04-30

## 2021-04-30 DIAGNOSIS — Z98.890 OTHER SPECIFIED POSTPROCEDURAL STATES: Chronic | ICD-10-CM

## 2021-04-30 DIAGNOSIS — Z95.2 PRESENCE OF PROSTHETIC HEART VALVE: Chronic | ICD-10-CM

## 2021-04-30 DIAGNOSIS — Z95.2 PRESENCE OF PROSTHETIC HEART VALVE: ICD-10-CM

## 2021-04-30 DIAGNOSIS — Z79.01 LONG TERM (CURRENT) USE OF ANTICOAGULANTS: ICD-10-CM

## 2021-04-30 LAB
INR PPP: 3 RATIO
POCT-PROTHROMBIN TIME: 36.1 SECS
QUALITY CONTROL: YES

## 2021-05-10 ENCOUNTER — NON-APPOINTMENT (OUTPATIENT)
Age: 46
End: 2021-05-10

## 2021-05-10 ENCOUNTER — APPOINTMENT (OUTPATIENT)
Dept: CARDIOLOGY | Facility: CLINIC | Age: 46
End: 2021-05-10
Payer: COMMERCIAL

## 2021-05-10 PROCEDURE — 93295 DEV INTERROG REMOTE 1/2/MLT: CPT

## 2021-05-10 PROCEDURE — 93296 REM INTERROG EVL PM/IDS: CPT

## 2021-05-19 ENCOUNTER — EMERGENCY (EMERGENCY)
Facility: HOSPITAL | Age: 46
LOS: 0 days | Discharge: HOME | End: 2021-05-19
Attending: EMERGENCY MEDICINE | Admitting: EMERGENCY MEDICINE
Payer: MEDICAID

## 2021-05-19 VITALS
HEART RATE: 77 BPM | DIASTOLIC BLOOD PRESSURE: 74 MMHG | OXYGEN SATURATION: 96 % | HEIGHT: 66 IN | RESPIRATION RATE: 16 BRPM | SYSTOLIC BLOOD PRESSURE: 122 MMHG | TEMPERATURE: 98 F | WEIGHT: 190.04 LBS

## 2021-05-19 DIAGNOSIS — Z98.890 OTHER SPECIFIED POSTPROCEDURAL STATES: Chronic | ICD-10-CM

## 2021-05-19 DIAGNOSIS — M25.461 EFFUSION, RIGHT KNEE: ICD-10-CM

## 2021-05-19 DIAGNOSIS — Z79.01 LONG TERM (CURRENT) USE OF ANTICOAGULANTS: ICD-10-CM

## 2021-05-19 DIAGNOSIS — M25.561 PAIN IN RIGHT KNEE: ICD-10-CM

## 2021-05-19 DIAGNOSIS — Z88.0 ALLERGY STATUS TO PENICILLIN: ICD-10-CM

## 2021-05-19 DIAGNOSIS — Z86.79 PERSONAL HISTORY OF OTHER DISEASES OF THE CIRCULATORY SYSTEM: ICD-10-CM

## 2021-05-19 DIAGNOSIS — Z98.890 OTHER SPECIFIED POSTPROCEDURAL STATES: ICD-10-CM

## 2021-05-19 DIAGNOSIS — Z95.2 PRESENCE OF PROSTHETIC HEART VALVE: Chronic | ICD-10-CM

## 2021-05-19 DIAGNOSIS — I25.10 ATHEROSCLEROTIC HEART DISEASE OF NATIVE CORONARY ARTERY WITHOUT ANGINA PECTORIS: ICD-10-CM

## 2021-05-19 PROCEDURE — 73564 X-RAY EXAM KNEE 4 OR MORE: CPT | Mod: 26,RT

## 2021-05-19 PROCEDURE — 99283 EMERGENCY DEPT VISIT LOW MDM: CPT

## 2021-05-19 RX ORDER — ACETAMINOPHEN 500 MG
975 TABLET ORAL ONCE
Refills: 0 | Status: COMPLETED | OUTPATIENT
Start: 2021-05-19 | End: 2021-05-19

## 2021-05-19 RX ADMIN — Medication 975 MILLIGRAM(S): at 22:23

## 2021-05-19 NOTE — ED PROVIDER NOTE - CARE PROVIDER_API CALL
Edis Shaffer (MD)  Orthopaedic Surgery  [address] [place] [address]  Phone: [phone]  Fax: [phone]  Follow Up Time:

## 2021-05-19 NOTE — ED PROVIDER NOTE - NSFOLLOWUPINSTRUCTIONS_ED_ALL_ED_FT
Log Out.    [email or web address]® CareNotes®     :  [facility]             KNEE PAIN - General Information     Knee Pain    WHAT YOU NEED TO KNOW:    What do I need to know about knee pain? Knee pain may start suddenly, or it may be a long-term problem. You may have pain on the side, front, or back of your knee. You may have knee stiffness and swelling. You may hear popping sounds or feel like your knee is giving way or locking up as you walk. You may feel pain when you sit, stand, walk, or climb up and down stairs.    What increases my risk for knee pain?     Obesity      A strain or tear in ligaments or tendons      A leg fracture or knee dislocation      Overuse of your knee      Osteoarthritis, rheumatoid arthritis, or gout      An infection, tumor, or cyst in your knee      Shoes that are not supportive, or training on a hard surface      Sports that involve jumping or pivoting on your knee    How is the cause of knee pain diagnosed? Your healthcare provider will examine your knee and ask about your symptoms. Tell your provider when the pain started and what you were doing at the time. Describe the pain, such as sharp, throbbing, or achy. Tell your provider about any knee injury or surgery you had. You may need any of the following:     MRI, CT, or ultrasound pictures may show an injury, fracture, or tumor.       Blood tests may be used to check the level of inflammation in your blood. The tests may also show signs of infection.      Arthroscopy is a procedure to look inside your knee joint with an arthroscope. An arthroscope is a flexible tube with a light and camera on the end. A knee arthroscopy is usually done to check for disease or damage inside your knee. These problems may be fixed during the procedure.    How is knee pain treated? Treatment will depend on the cause of your pain. You may need any of the following:     NSAIDs help decrease swelling and pain or fever. This medicine is available with or without a doctor's order. NSAIDs can cause stomach bleeding or kidney problems in certain people. If you take blood thinner medicine, always ask your healthcare provider if NSAIDs are safe for you. Always read the medicine label and follow directions.      Acetaminophen decreases pain and fever. It is available without a doctor's order. Ask how much to take and how often to take it. Follow directions. Read the labels of all other medicines you are using to see if they also contain acetaminophen, or ask your doctor or pharmacist. Acetaminophen can cause liver damage if not taken correctly. Do not use more than 4 grams (4,000 milligrams) total of acetaminophen in one day.       Prescription pain medicine may be given. Ask your healthcare provider how to take this medicine safely. Some prescription pain medicines contain acetaminophen. Do not take other medicines that contain acetaminophen without talking to your healthcare provider. Too much acetaminophen may cause liver damage. Prescription pain medicine may cause constipation. Ask your healthcare provider how to prevent or treat constipation.       Steroid injections may be given into your knee. Steroids reduce inflammation and pain.      Surgery may be used for some injuries, such as to repair a torn ACL.    What can I do to manage my symptoms?     Rest your knee so it can heal. Limit activities that increase your pain. Do low-impact exercises, such as walking or swimming.       Apply ice to help reduce swelling and pain. Use an ice pack, or put crushed ice in a plastic bag. Cover it with a towel before you apply it to your knee. Apply ice for 15 to 20 minutes every hour, or as directed.      Apply compression to help reduce swelling. Use a brace or bandage only as directed.      Elevate your knee to help decrease pain and swelling. Elevate your knee while you are sitting or lying down. Prop your leg on pillows to keep your knee above the level of your heart.      Prevent your knee from moving as directed. Your healthcare provider may put on a cast or splint. You may need to wear a leg brace to stabilize your knee. A leg brace can be adjusted to increase your range of motion as your knee heals.Hinged Knee Braces          What can I do to prevent knee pain?     Maintain a healthy weight. Extra weight increases your risk for knee pain. Ask your healthcare provider how much you should weigh. He or she can help you create a safe weight loss plan if you need to lose weight.      Exercise or train properly. Use the correct equipment for sports. Wear shoes that provide good support. Check your posture often as you exercise, play sports, or train for an event. This can help prevent stress and strain on your knees. Rest between sessions so you do not overwork your knees.    When should I seek immediate care?     Your pain is worse, even after treatment.       You cannot bend or straighten your leg completely.       The swelling around your knee does not go down even with treatment.      Your knee is painful and hot to the touch.     When should I contact my healthcare provider?     You have questions or concerns about your condition or care.         CARE AGREEMENT:    You have the right to help plan your care. Learn about your health condition and how it may be treated. Discuss treatment options with your healthcare providers to decide what care you want to receive. You always have the right to refuse treatment

## 2021-05-19 NOTE — ED PROVIDER NOTE - PHYSICAL EXAMINATION
Physical Exam    Vital Signs: I have reviewed the initial vital signs.  Constitutional: well-nourished, appears stated age, no acute distress  Eyes: Conjunctiva pink, Sclera clear, PERRLA, EOMI.  Musculoskeletal: supple neck, no lower extremity edema, no midline tenderness. TTP of the R knee with notable goose-egg appearance swelling to R patella, no erythema. FROM, 5/5 strength and no  ensosry def noted   Integumentary: warm, dry, no rash  Neurologic: awake, alert, cranial nerves II-XII grossly intact, extremities’ motor and sensory functions grossly intact  Psychiatric: appropriate mood, appropriate affect

## 2021-05-19 NOTE — ED PROVIDER NOTE - CLINICAL SUMMARY MEDICAL DECISION MAKING FREE TEXT BOX
45yM pw  right knee swelling and pain started on today - no fever  no systemic symptoms  swelling anterior knee able to flex and extend.  no redness .  xray no acute bony abnormality-  ace wrap applied.  outpt  ortho follow up

## 2021-05-19 NOTE — ED PROVIDER NOTE - OBJECTIVE STATEMENT
Pt is a 45 year old male with PMH AICD, CAD, Valve disease on Coumadin presents to ED with complaints of R knee pain. 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 Pt states pain ongoing for 1 week, throbbing non radiating with no alleviating or aggravatingly factors. Denies trauma to knee. no other complaints

## 2021-05-19 NOTE — ED PROVIDER NOTE - PATIENT PORTAL LINK FT
You can access the FollowMyHealth Patient Portal offered by [facility] by registering at the following website: [email or web address]. By joining [facility]’s FollowMyHealth portal, you will also be able to view your health information using other applications (apps) compatible with our system.

## 2021-05-26 NOTE — PACU DISCHARGE NOTE - NSCLINEINSERTRD_GEN_ALL_CORE
No I personally performed the service described in the documentation  recorded by the scribe in my presence, and it accurately and completely records my words and action.

## 2021-05-28 ENCOUNTER — OUTPATIENT (OUTPATIENT)
Dept: OUTPATIENT SERVICES | Facility: HOSPITAL | Age: 46
LOS: 1 days | Discharge: HOME | End: 2021-05-28

## 2021-05-28 ENCOUNTER — APPOINTMENT (OUTPATIENT)
Dept: MEDICATION MANAGEMENT | Facility: CLINIC | Age: 46
End: 2021-05-28

## 2021-05-28 DIAGNOSIS — Z79.01 LONG TERM (CURRENT) USE OF ANTICOAGULANTS: ICD-10-CM

## 2021-05-28 DIAGNOSIS — Z95.2 PRESENCE OF PROSTHETIC HEART VALVE: Chronic | ICD-10-CM

## 2021-05-28 DIAGNOSIS — Z98.890 OTHER SPECIFIED POSTPROCEDURAL STATES: Chronic | ICD-10-CM

## 2021-05-28 DIAGNOSIS — Z95.2 PRESENCE OF PROSTHETIC HEART VALVE: ICD-10-CM

## 2021-05-28 LAB
INR PPP: 4 RATIO
POCT-PROTHROMBIN TIME: 47.7 SECS
QUALITY CONTROL: YES

## 2021-06-02 ENCOUNTER — APPOINTMENT (OUTPATIENT)
Dept: CARDIOLOGY | Facility: CLINIC | Age: 46
End: 2021-06-02
Payer: COMMERCIAL

## 2021-06-02 VITALS
HEIGHT: 66 IN | WEIGHT: 190 LBS | DIASTOLIC BLOOD PRESSURE: 77 MMHG | TEMPERATURE: 97.9 F | OXYGEN SATURATION: 99 % | RESPIRATION RATE: 16 BRPM | HEART RATE: 83 BPM | BODY MASS INDEX: 30.53 KG/M2 | SYSTOLIC BLOOD PRESSURE: 117 MMHG

## 2021-06-02 PROCEDURE — 99072 ADDL SUPL MATRL&STAF TM PHE: CPT

## 2021-06-02 PROCEDURE — 93261 INTERROGATE SUBQ DEFIB: CPT

## 2021-06-02 NOTE — HISTORY OF PRESENT ILLNESS
[de-identified] : \par Cardiologist: Dr. 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 Patient states that was the first time he was ever informed of cardiomyopathy. He was experiencing heart failure symptoms including dyspnea at rest and PND. He was prescribed Entresto but couldn't afford it due to insurance issues. A subcutaneous ICD was implanted on 1/3/2019. \par \par Presents for sub cutaneous icd interrogation

## 2021-06-02 NOTE — PHYSICAL EXAM
[General Appearance - Well Developed] : well developed [Normal Appearance] : normal appearance [Well Groomed] : well groomed [General Appearance - Well Nourished] : well nourished [No Deformities] : no deformities [General Appearance - In No Acute Distress] : no acute distress [Heart Rate And Rhythm] : heart rate and rhythm were normal [Heart Sounds] : normal S1 and S2 [] : no respiratory distress [Clean] : clean [Dry] : dry [Well-Healed] : well-healed [FreeTextEntry1] : left axillary [Abdomen Soft] : soft

## 2021-06-02 NOTE — PROCEDURE
[NSR] : normal sinus rhythm [ICD] : Implantable cardioverter-defibrillator [Longevity: ___ months] : The estimated remaining battery life is [unfilled] months [Threshold Testing Performed] : Threshold testing was not performed [Lead Imp:  ___ohms] : lead impedance was [unfilled] ohms [Programmed for Longevity] : output reprogrammed for improved battery longevity [de-identified] : [facility] [de-identified] : A219 [de-identified] : [id number] [de-identified] : 1/3/19 [de-identified] : No events. Normal SubQ ICD function.

## 2021-06-02 NOTE — ASSESSMENT
[FreeTextEntry1] : Mr. Wooten presents for routine follow up of subcutaneous ICD, which was implanted for primary prevention for dilated cardiomyopathy. He denies any cardiovascular complaints. He takes coumadin for his mechanical aortic valve and denies any signs/symptoms of bleeding. \par \par \par Device was interrogated, No ICD shocks. \par He is enrolled in remote monitoring and is manually transmitting weekly. \par \par  I have also advised the patient to go to the nearest emergency room if he experiences any chest pain, dyspnea, syncope, or has any other compelling symptoms.\par \par RTO in 3 months\par

## 2021-06-04 ENCOUNTER — OUTPATIENT (OUTPATIENT)
Dept: OUTPATIENT SERVICES | Facility: HOSPITAL | Age: 46
LOS: 1 days | Discharge: HOME | End: 2021-06-04

## 2021-06-04 ENCOUNTER — APPOINTMENT (OUTPATIENT)
Dept: MEDICATION MANAGEMENT | Facility: CLINIC | Age: 46
End: 2021-06-04

## 2021-06-04 DIAGNOSIS — Z95.2 PRESENCE OF PROSTHETIC HEART VALVE: Chronic | ICD-10-CM

## 2021-06-04 DIAGNOSIS — Z95.2 PRESENCE OF PROSTHETIC HEART VALVE: ICD-10-CM

## 2021-06-04 DIAGNOSIS — Z98.890 OTHER SPECIFIED POSTPROCEDURAL STATES: Chronic | ICD-10-CM

## 2021-06-04 DIAGNOSIS — Z79.01 LONG TERM (CURRENT) USE OF ANTICOAGULANTS: ICD-10-CM

## 2021-06-04 LAB
INR PPP: 3.4 RATIO
POCT-PROTHROMBIN TIME: 40.8 SECS
QUALITY CONTROL: YES

## 2021-06-12 ENCOUNTER — OUTPATIENT (OUTPATIENT)
Dept: OUTPATIENT SERVICES | Facility: HOSPITAL | Age: 46
LOS: 1 days | Discharge: HOME | End: 2021-06-12

## 2021-06-12 DIAGNOSIS — Z98.890 OTHER SPECIFIED POSTPROCEDURAL STATES: Chronic | ICD-10-CM

## 2021-06-12 DIAGNOSIS — Z95.2 PRESENCE OF PROSTHETIC HEART VALVE: Chronic | ICD-10-CM

## 2021-06-12 DIAGNOSIS — Z11.59 ENCOUNTER FOR SCREENING FOR OTHER VIRAL DISEASES: ICD-10-CM

## 2021-06-17 ENCOUNTER — OUTPATIENT (OUTPATIENT)
Dept: OUTPATIENT SERVICES | Facility: HOSPITAL | Age: 46
LOS: 1 days | Discharge: HOME | End: 2021-06-17

## 2021-06-17 ENCOUNTER — APPOINTMENT (OUTPATIENT)
Dept: MEDICATION MANAGEMENT | Facility: CLINIC | Age: 46
End: 2021-06-17

## 2021-06-17 DIAGNOSIS — Z98.890 OTHER SPECIFIED POSTPROCEDURAL STATES: Chronic | ICD-10-CM

## 2021-06-17 DIAGNOSIS — Z95.2 PRESENCE OF PROSTHETIC HEART VALVE: Chronic | ICD-10-CM

## 2021-06-17 DIAGNOSIS — Z79.01 LONG TERM (CURRENT) USE OF ANTICOAGULANTS: ICD-10-CM

## 2021-06-17 DIAGNOSIS — Z95.2 PRESENCE OF PROSTHETIC HEART VALVE: ICD-10-CM

## 2021-06-17 LAB
INR PPP: 2.8 RATIO
POCT-PROTHROMBIN TIME: 33.6 SECS
QUALITY CONTROL: YES

## 2021-06-24 NOTE — ASSESSMENT
[FreeTextEntry1] : Mr. Wooten presents for routine follow up of subcutaneous ICD, which was implanted for primary prevention for dilated cardiomyopathy. \par \par # DCM s/p subcutaneous ICD\par - Advisory information reviewed with pt\par - Remote monitor is set up and patient is transmitting.\par - Follow up in 3 mo with NP for in office interrogation\par \par # Mechanical Aortic Valve\par - Cont coumadin. No s/sx of bleeding. \par \par I have also advised the patient to go to the nearest emergency room if he experiences any chest pain, dyspnea, syncope, or has any other compelling symptoms.

## 2021-06-24 NOTE — PROCEDURE
[NSR] : normal sinus rhythm [ICD] : Implantable cardioverter-defibrillator [Longevity: ___ months] : The estimated remaining battery life is [unfilled] months [Lead Imp:  ___ohms] : lead impedance was [unfilled] ohms [Programmed for Longevity] : output reprogrammed for improved battery longevity [No] : not [See Device Printout] : See device printout [Threshold Testing Performed] : Threshold testing was not performed [de-identified] : [facility] [de-identified] : A219 [de-identified] : [id number] [de-identified] : 1/3/19 [de-identified] : No events.\par Lead advisory information reviewed with the patient.

## 2021-06-24 NOTE — HISTORY OF PRESENT ILLNESS
[de-identified] : \par Cardiologist: Dr. 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 Patient states that was the first time he was ever informed of cardiomyopathy. He was experiencing heart failure symptoms including dyspnea at rest and PND. He was prescribed Entresto but couldn't afford it due to insurance issues. Since discharge, he took his meds as prescribed. He wore the LifeVest and is now s/p subcutaneous ICD 1/3/2019. He has a remote monitor set up. He has not been shocked by his device. Lost 10 lb last year with keto diet. \par \par He is here for routine device check. He denies chest pain, dyspnea, dizziness, lightheadedness, presyncope or syncope. No PND or lower extremity edema. Stable 2 pillow orthopnea.

## 2021-06-24 NOTE — PHYSICAL EXAM
[General Appearance - Well Developed] : well developed [Normal Appearance] : normal appearance [Well Groomed] : well groomed [General Appearance - Well Nourished] : well nourished [No Deformities] : no deformities [General Appearance - In No Acute Distress] : no acute distress [Heart Rate And Rhythm] : heart rate and rhythm were normal [Heart Sounds] : normal S1 and S2 [Murmurs] : no murmurs present [Edema] : no peripheral edema present [] : no respiratory distress [Respiration, Rhythm And Depth] : normal respiratory rhythm and effort [Exaggerated Use Of Accessory Muscles For Inspiration] : no accessory muscle use [Auscultation Breath Sounds / Voice Sounds] : lungs were clear to auscultation bilaterally [Clean] : clean [Dry] : dry [Well-Healed] : well-healed [Erythema] : not erythematous [Warm] : not warm [Tender] : not tender [FreeTextEntry1] : Left sided axillary and subxiphoid incisions are well healed [Bowel Sounds] : normal bowel sounds [Abdomen Soft] : soft [Nail Clubbing] : no clubbing of the fingernails [Cyanosis, Localized] : no localized cyanosis

## 2021-06-29 ENCOUNTER — OUTPATIENT (OUTPATIENT)
Dept: OUTPATIENT SERVICES | Facility: HOSPITAL | Age: 46
LOS: 1 days | Discharge: HOME | End: 2021-06-29
Payer: COMMERCIAL

## 2021-06-29 DIAGNOSIS — M25.561 PAIN IN RIGHT KNEE: ICD-10-CM

## 2021-06-29 DIAGNOSIS — Z98.890 OTHER SPECIFIED POSTPROCEDURAL STATES: Chronic | ICD-10-CM

## 2021-06-29 DIAGNOSIS — Z95.2 PRESENCE OF PROSTHETIC HEART VALVE: Chronic | ICD-10-CM

## 2021-06-29 DIAGNOSIS — M70.41 PREPATELLAR BURSITIS, RIGHT KNEE: ICD-10-CM

## 2021-06-29 PROCEDURE — 73721 MRI JNT OF LWR EXTRE W/O DYE: CPT | Mod: 26,RT

## 2021-07-02 ENCOUNTER — APPOINTMENT (OUTPATIENT)
Dept: UROLOGY | Facility: CLINIC | Age: 46
End: 2021-07-02
Payer: COMMERCIAL

## 2021-07-02 ENCOUNTER — APPOINTMENT (OUTPATIENT)
Dept: MEDICATION MANAGEMENT | Facility: CLINIC | Age: 46
End: 2021-07-02

## 2021-07-02 ENCOUNTER — OUTPATIENT (OUTPATIENT)
Dept: OUTPATIENT SERVICES | Facility: HOSPITAL | Age: 46
LOS: 1 days | Discharge: HOME | End: 2021-07-02

## 2021-07-02 VITALS
SYSTOLIC BLOOD PRESSURE: 116 MMHG | BODY MASS INDEX: 31.34 KG/M2 | DIASTOLIC BLOOD PRESSURE: 80 MMHG | HEART RATE: 74 BPM | HEIGHT: 66 IN | WEIGHT: 195 LBS

## 2021-07-02 DIAGNOSIS — Z98.890 OTHER SPECIFIED POSTPROCEDURAL STATES: Chronic | ICD-10-CM

## 2021-07-02 DIAGNOSIS — R39.13 SPLITTING OF URINARY STREAM: ICD-10-CM

## 2021-07-02 DIAGNOSIS — Z95.2 PRESENCE OF PROSTHETIC HEART VALVE: Chronic | ICD-10-CM

## 2021-07-02 DIAGNOSIS — Z79.01 LONG TERM (CURRENT) USE OF ANTICOAGULANTS: ICD-10-CM

## 2021-07-02 DIAGNOSIS — Z95.2 PRESENCE OF PROSTHETIC HEART VALVE: ICD-10-CM

## 2021-07-02 LAB
INR PPP: 3.5 RATIO
POCT-PROTHROMBIN TIME: 42.4 SECS
QUALITY CONTROL: YES

## 2021-07-02 PROCEDURE — 99203 OFFICE O/P NEW LOW 30 MIN: CPT

## 2021-07-02 PROCEDURE — 99072 ADDL SUPL MATRL&STAF TM PHE: CPT

## 2021-07-02 NOTE — HISTORY OF PRESENT ILLNESS
[FreeTextEntry1] : This is a 45 year male who had cardiac valve surgery 4 years ago -- has mechanical valves\par \par after this developed ED \par on BP meds - metoprolol\par \par rare morning erections\par poor even with masturbation\par \par normal sex drive \par \par no depression or anxiety \par \par never tried viagra or  cilais \par can walk 2 miles without chest pain\par \par July 2021-- IIEF = 10 -- moderate ED  \par \par normal urination except for intermittency -- not too much of a bother

## 2021-07-09 ENCOUNTER — APPOINTMENT (OUTPATIENT)
Dept: MEDICATION MANAGEMENT | Facility: CLINIC | Age: 46
End: 2021-07-09

## 2021-07-16 ENCOUNTER — APPOINTMENT (OUTPATIENT)
Dept: MEDICATION MANAGEMENT | Facility: CLINIC | Age: 46
End: 2021-07-16

## 2021-07-25 ENCOUNTER — APPOINTMENT (OUTPATIENT)
Dept: UROLOGY | Facility: CLINIC | Age: 46
End: 2021-07-25
Payer: COMMERCIAL

## 2021-07-25 PROCEDURE — 99214 OFFICE O/P EST MOD 30 MIN: CPT

## 2021-07-25 NOTE — ASSESSMENT
[FreeTextEntry1] : This is a 45 year male who had cardiac valve surgery 4 years ago -- has mechanical valves\par \par after this developed ED \par on BP meds - metoprolol\par \par rare morning erections\par poor even with masturbation\par \par normal sex drive \par \par no depression or anxiety \par \par never tried viagra or cilais \par can walk 2 miles without chest pain\par \par July 2021-- IIEF = 10 -- moderate ED \par started on 100mg sildenafil last visit -- states that erections went from a 3 to a 6-- had not side effects \par \par \par normal urination except for intermittency -- not too much of a bother. \par \par  July 2021\par Estradiol, Serum = 47\par Testosterone, Total = 335\par

## 2021-07-25 NOTE — HISTORY OF PRESENT ILLNESS
[Other Location: e.g. Home (Enter Location, City,State)___] : at [unfilled] [Verbal consent obtained from patient] : the patient, [unfilled] [FreeTextEntry1] : TELEMEDICINE -- HPI FOR FOLLOW UP APPOINTMENT\par \par The patient-doctor relationship has been established in a face to face fashion via real time video/audio HIPAA compliant communication using telemedicine software. The patient's identity has been confirmed. The patient was previously emailed a copy of the telemedicine consent. They have had a chance to review and has now given verbal consent and has requested care to be assessed and treated through telemedicine and understands there maybe limitations in this process and they may need further follow up care in the office and or hospital settings.\par \par The patient denies fevers, chills, nausea and or vomiting and no unexplained weight loss.\par \par All pertinent parts of the patient PFSH (past medical, family and social histories), laboratory, radiological studies and physician notes were reviewed prior to starting the face to face portion of the telemedicine visit. Questionnaire results were discussed with patient.\par \par ==================================================================================================== \par \par This is a 45 year male who had cardiac valve surgery 4 years ago -- has mechanical valves\par \par after this developed ED \par on BP meds - metoprolol\par \par rare morning erections\par poor even with masturbation\par \par normal sex drive \par \par no depression or anxiety \par \par never tried viagra or cilais \par can walk 2 miles without chest pain\par \par July 2021-- IIEF = 10 -- moderate ED \par started on 100mg sildenafil last visit -- states that erections went from a 3 to a 6-- had not side effects \par \par \par normal urination except for intermittency -- not too much of a bother. \par \par  July 2021\par Estradiol, Serum = 47\par Testosterone, Total = 335\par \par \par [email or web address]---[phone]\par \par \par lab work was done at [facility] in

## 2021-07-30 ENCOUNTER — OUTPATIENT (OUTPATIENT)
Dept: OUTPATIENT SERVICES | Facility: HOSPITAL | Age: 46
LOS: 1 days | Discharge: HOME | End: 2021-07-30

## 2021-07-30 ENCOUNTER — APPOINTMENT (OUTPATIENT)
Dept: MEDICATION MANAGEMENT | Facility: CLINIC | Age: 46
End: 2021-07-30

## 2021-07-30 DIAGNOSIS — Z98.890 OTHER SPECIFIED POSTPROCEDURAL STATES: Chronic | ICD-10-CM

## 2021-07-30 DIAGNOSIS — Z95.2 PRESENCE OF PROSTHETIC HEART VALVE: Chronic | ICD-10-CM

## 2021-07-30 DIAGNOSIS — Z79.01 LONG TERM (CURRENT) USE OF ANTICOAGULANTS: ICD-10-CM

## 2021-07-30 DIAGNOSIS — Z95.2 PRESENCE OF PROSTHETIC HEART VALVE: ICD-10-CM

## 2021-07-30 LAB
INR PPP: 4.2 RATIO
POCT-PROTHROMBIN TIME: 49.9 SECS
QUALITY CONTROL: YES

## 2021-08-10 ENCOUNTER — NON-APPOINTMENT (OUTPATIENT)
Age: 46
End: 2021-08-10

## 2021-08-10 ENCOUNTER — APPOINTMENT (OUTPATIENT)
Dept: CARDIOLOGY | Facility: CLINIC | Age: 46
End: 2021-08-10
Payer: COMMERCIAL

## 2021-08-10 PROCEDURE — 93296 REM INTERROG EVL PM/IDS: CPT

## 2021-08-10 PROCEDURE — 93295 DEV INTERROG REMOTE 1/2/MLT: CPT

## 2021-08-13 ENCOUNTER — APPOINTMENT (OUTPATIENT)
Dept: MEDICATION MANAGEMENT | Facility: CLINIC | Age: 46
End: 2021-08-13

## 2021-08-13 ENCOUNTER — OUTPATIENT (OUTPATIENT)
Dept: OUTPATIENT SERVICES | Facility: HOSPITAL | Age: 46
LOS: 1 days | Discharge: HOME | End: 2021-08-13

## 2021-08-13 VITALS — OXYGEN SATURATION: 98 % | RESPIRATION RATE: 16 BRPM | HEART RATE: 81 BPM

## 2021-08-13 DIAGNOSIS — Z95.2 PRESENCE OF PROSTHETIC HEART VALVE: Chronic | ICD-10-CM

## 2021-08-13 DIAGNOSIS — Z98.890 OTHER SPECIFIED POSTPROCEDURAL STATES: Chronic | ICD-10-CM

## 2021-08-13 DIAGNOSIS — Z95.2 PRESENCE OF PROSTHETIC HEART VALVE: ICD-10-CM

## 2021-08-13 DIAGNOSIS — Z79.01 LONG TERM (CURRENT) USE OF ANTICOAGULANTS: ICD-10-CM

## 2021-08-13 LAB
INR PPP: 2.5 RATIO
POCT-PROTHROMBIN TIME: 29.9 SECS
QUALITY CONTROL: YES

## 2021-09-10 ENCOUNTER — OUTPATIENT (OUTPATIENT)
Dept: OUTPATIENT SERVICES | Facility: HOSPITAL | Age: 46
LOS: 1 days | Discharge: HOME | End: 2021-09-10

## 2021-09-10 ENCOUNTER — APPOINTMENT (OUTPATIENT)
Dept: MEDICATION MANAGEMENT | Facility: CLINIC | Age: 46
End: 2021-09-10

## 2021-09-10 DIAGNOSIS — Z95.2 PRESENCE OF PROSTHETIC HEART VALVE: Chronic | ICD-10-CM

## 2021-09-10 DIAGNOSIS — Z95.2 PRESENCE OF PROSTHETIC HEART VALVE: ICD-10-CM

## 2021-09-10 DIAGNOSIS — Z98.890 OTHER SPECIFIED POSTPROCEDURAL STATES: Chronic | ICD-10-CM

## 2021-09-10 DIAGNOSIS — Z79.01 LONG TERM (CURRENT) USE OF ANTICOAGULANTS: ICD-10-CM

## 2021-09-10 LAB
INR PPP: 2.6 RATIO
POCT-PROTHROMBIN TIME: 30.7 SECS
QUALITY CONTROL: YES

## 2021-09-15 ENCOUNTER — APPOINTMENT (OUTPATIENT)
Dept: UROLOGY | Facility: CLINIC | Age: 46
End: 2021-09-15
Payer: COMMERCIAL

## 2021-09-15 VITALS — HEIGHT: 65 IN | BODY MASS INDEX: 32.49 KG/M2 | WEIGHT: 195 LBS

## 2021-09-15 DIAGNOSIS — R79.89 OTHER SPECIFIED ABNORMAL FINDINGS OF BLOOD CHEMISTRY: ICD-10-CM

## 2021-09-15 PROCEDURE — 99214 OFFICE O/P EST MOD 30 MIN: CPT

## 2021-09-15 NOTE — HISTORY OF PRESENT ILLNESS
[FreeTextEntry1] : This is a 45 year male who had cardiac valve surgery 4 years ago -- has mechanical valves\par \par after this developed ED \par on BP meds - metoprolol\par \par rare morning erections\par poor even with masturbation\par \par normal sex drive \par \par no depression or anxiety \par \par never tried viagra or cilais \par can walk 2 miles without chest pain\par \par July 2021-- IIEF = 10 -- moderate ED \par started on 100mg sildenafil last visit -- states that erections went from a 3 to a 6-- had not side effects \par \par \par normal urination except for intermittency -- not too much of a bother. \par \par  July 2021\par Estradiol, Serum = 47\par Testosterone, Total = 335\par started anastrozole but he didn’t  yet \par \par \par increased sildenafil to 150mg and felt improvement -- happy with this result\par \par

## 2021-09-15 NOTE — ASSESSMENT
[FreeTextEntry1] : This is a 45 year male who had cardiac valve surgery 4 years ago -- has mechanical valves\par \par after this developed ED \par on BP meds - metoprolol\par \par rare morning erections\par poor even with masturbation\par \par normal sex drive \par \par no depression or anxiety \par \par never tried viagra or cilais \par can walk 2 miles without chest pain\par \par July 2021-- IIEF = 10 -- moderate ED \par started on 100mg sildenafil last visit -- states that erections went from a 3 to a 6-- had not side effects \par \par \par normal urination except for intermittency -- not too much of a bother. \par \par  July 2021\par Estradiol, Serum = 47\par Testosterone, Total = 335\par started anastrozole but he didn’t  yet \par \par \par increased sildenafil to 150mg and felt improvement -- happy with this result\par

## 2021-09-22 ENCOUNTER — APPOINTMENT (OUTPATIENT)
Dept: CARDIOLOGY | Facility: CLINIC | Age: 46
End: 2021-09-22
Payer: COMMERCIAL

## 2021-09-22 VITALS
TEMPERATURE: 97.9 F | DIASTOLIC BLOOD PRESSURE: 88 MMHG | BODY MASS INDEX: 32.49 KG/M2 | SYSTOLIC BLOOD PRESSURE: 132 MMHG | HEART RATE: 80 BPM | HEIGHT: 65 IN | WEIGHT: 195 LBS

## 2021-09-22 PROCEDURE — 93282 PRGRMG EVAL IMPLANTABLE DFB: CPT

## 2021-09-22 NOTE — ASSESSMENT
[FreeTextEntry1] : Mr. Wooten presents for routine follow up of subcutaneous ICD, which was implanted for primary prevention for dilated cardiomyopathy. He denies any cardiovascular complaints. He takes Warfarin for his mechanical aortic valve and denies any signs/symptoms of bleeding. \par \par Device was interrogated, No ICD shocks. \par He is enrolled in remote monitoring and is manually transmitting. \par \par  I have also advised the patient to go to the nearest emergency room if he experiences any chest pain, dyspnea, syncope, or has any other compelling symptoms.\par \par RTO in 3 months. Will follow up with patient if he is on GDMT.\par

## 2021-09-22 NOTE — PHYSICAL EXAM
[General Appearance - Well Developed] : well developed [Normal Appearance] : normal appearance [Well Groomed] : well groomed [General Appearance - Well Nourished] : well nourished [No Deformities] : no deformities [General Appearance - In No Acute Distress] : no acute distress [Heart Rate And Rhythm] : heart rate and rhythm were normal [Heart Sounds] : normal S1 and S2 [] : no respiratory distress [Clean] : clean [Dry] : dry [Well-Healed] : well-healed [Abdomen Soft] : soft [FreeTextEntry1] : left axillary

## 2021-09-22 NOTE — PROCEDURE
[No] : not [ICD] : Implantable cardioverter-defibrillator [de-identified] : [facility] [de-identified] : A219 [de-identified] : [id number] [de-identified] : 01/03/2019 [de-identified] : N/A sub q [de-identified] : 71% Battery Life [de-identified] : SUB Q ICD\par No events No AF\par Battery 71%\par Normal Device Function\par Patient has new 4G Home Monitor\par Isometric maneuvers in all vectors sensing noise appropriately.

## 2021-09-22 NOTE — HISTORY OF PRESENT ILLNESS
[de-identified] : \par Cardiologist: Dr. 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 Patient states that was the first time he was ever informed of cardiomyopathy. He was experiencing heart failure symptoms including dyspnea at rest and PND. He was prescribed Entresto but couldn't afford it due to insurance issues. A subcutaneous ICD was implanted on 1/3/2019. \par \par Presents for sub cutaneous icd interrogation. Lead is on advisory. \par \par The patient has no complaints. He is doing well. \par \par ECG (9/22/2021): Sinus rhythm at 83 bpm, No ST/T abnormalities

## 2021-10-08 ENCOUNTER — APPOINTMENT (OUTPATIENT)
Dept: MEDICATION MANAGEMENT | Facility: CLINIC | Age: 46
End: 2021-10-08

## 2021-10-08 ENCOUNTER — OUTPATIENT (OUTPATIENT)
Dept: OUTPATIENT SERVICES | Facility: HOSPITAL | Age: 46
LOS: 1 days | Discharge: HOME | End: 2021-10-08

## 2021-10-08 DIAGNOSIS — Z95.2 PRESENCE OF PROSTHETIC HEART VALVE: Chronic | ICD-10-CM

## 2021-10-08 DIAGNOSIS — Z95.2 PRESENCE OF PROSTHETIC HEART VALVE: ICD-10-CM

## 2021-10-08 DIAGNOSIS — Z79.01 LONG TERM (CURRENT) USE OF ANTICOAGULANTS: ICD-10-CM

## 2021-10-08 DIAGNOSIS — Z98.890 OTHER SPECIFIED POSTPROCEDURAL STATES: Chronic | ICD-10-CM

## 2021-10-08 LAB
INR PPP: 2.7 RATIO
POCT-PROTHROMBIN TIME: 32.1 SECS
QUALITY CONTROL: YES

## 2021-11-08 ENCOUNTER — APPOINTMENT (OUTPATIENT)
Dept: MEDICATION MANAGEMENT | Facility: CLINIC | Age: 46
End: 2021-11-08

## 2021-11-08 ENCOUNTER — OUTPATIENT (OUTPATIENT)
Dept: OUTPATIENT SERVICES | Facility: HOSPITAL | Age: 46
LOS: 1 days | Discharge: HOME | End: 2021-11-08

## 2021-11-08 DIAGNOSIS — Z98.890 OTHER SPECIFIED POSTPROCEDURAL STATES: Chronic | ICD-10-CM

## 2021-11-08 DIAGNOSIS — Z79.01 LONG TERM (CURRENT) USE OF ANTICOAGULANTS: ICD-10-CM

## 2021-11-08 DIAGNOSIS — Z95.2 PRESENCE OF PROSTHETIC HEART VALVE: Chronic | ICD-10-CM

## 2021-11-08 DIAGNOSIS — Z95.2 PRESENCE OF PROSTHETIC HEART VALVE: ICD-10-CM

## 2021-11-08 LAB
INR PPP: 3.6 RATIO
POCT-PROTHROMBIN TIME: 42.8 SECS
QUALITY CONTROL: YES

## 2021-11-10 ENCOUNTER — APPOINTMENT (OUTPATIENT)
Dept: CARDIOLOGY | Facility: CLINIC | Age: 46
End: 2021-11-10
Payer: COMMERCIAL

## 2021-11-10 ENCOUNTER — NON-APPOINTMENT (OUTPATIENT)
Age: 46
End: 2021-11-10

## 2021-11-10 PROCEDURE — 93295 DEV INTERROG REMOTE 1/2/MLT: CPT

## 2021-11-10 PROCEDURE — 93296 REM INTERROG EVL PM/IDS: CPT

## 2021-11-17 ENCOUNTER — APPOINTMENT (OUTPATIENT)
Dept: UROLOGY | Facility: CLINIC | Age: 46
End: 2021-11-17
Payer: COMMERCIAL

## 2021-11-17 VITALS — WEIGHT: 190 LBS | HEIGHT: 66 IN | BODY MASS INDEX: 30.53 KG/M2

## 2021-11-17 DIAGNOSIS — R79.89 OTHER SPECIFIED ABNORMAL FINDINGS OF BLOOD CHEMISTRY: ICD-10-CM

## 2021-11-17 DIAGNOSIS — N52.01 ERECTILE DYSFUNCTION DUE TO ARTERIAL INSUFFICIENCY: ICD-10-CM

## 2021-11-17 PROCEDURE — 99214 OFFICE O/P EST MOD 30 MIN: CPT

## 2021-11-17 RX ORDER — ANASTROZOLE TABLETS 1 MG/1
1 TABLET ORAL
Qty: 30 | Refills: 5 | Status: DISCONTINUED | COMMUNITY
Start: 2021-07-25 | End: 2021-11-17

## 2021-11-17 NOTE — ASSESSMENT
[FreeTextEntry1] : This is a 45 year male who had cardiac valve surgery 4 years ago -- has mechanical valves\par \par after this developed ED \par on BP meds - metoprolol\par \par rare morning erections\par poor even with masturbation\par \par normal sex drive \par \par no depression or anxiety \par \par never tried viagra or cilais \par can walk 2 miles without chest pain\par \par July 2021-- IIEF = 10 -- moderate ED \par started on 150mg sildenafil last visit -- states that erections are better\par \par normal urination except for intermittency -- not too much of a bother. \par \par  July 2021\par Estradiol, Serum = 47\par Testosterone, Total = 335\par started anastrozole but he felt "strange" and stopped after five days \par \par new results since last visit\par Nov 2021\par Complete Blood Count = hgb = 15.1\par Estradiol, Serum; \par Hepatic Function Panel = normal\par Testosterone, Total = 363

## 2021-11-22 ENCOUNTER — OUTPATIENT (OUTPATIENT)
Dept: OUTPATIENT SERVICES | Facility: HOSPITAL | Age: 46
LOS: 1 days | Discharge: HOME | End: 2021-11-22

## 2021-11-22 ENCOUNTER — APPOINTMENT (OUTPATIENT)
Dept: MEDICATION MANAGEMENT | Facility: CLINIC | Age: 46
End: 2021-11-22

## 2021-11-22 DIAGNOSIS — Z98.890 OTHER SPECIFIED POSTPROCEDURAL STATES: Chronic | ICD-10-CM

## 2021-11-22 DIAGNOSIS — Z79.01 LONG TERM (CURRENT) USE OF ANTICOAGULANTS: ICD-10-CM

## 2021-11-22 DIAGNOSIS — Z95.2 PRESENCE OF PROSTHETIC HEART VALVE: Chronic | ICD-10-CM

## 2021-11-22 DIAGNOSIS — Z95.2 PRESENCE OF PROSTHETIC HEART VALVE: ICD-10-CM

## 2021-11-22 LAB
INR PPP: 3.8 RATIO
POCT-PROTHROMBIN TIME: 45.4 SECS
QUALITY CONTROL: YES

## 2021-12-21 ENCOUNTER — APPOINTMENT (OUTPATIENT)
Dept: MEDICATION MANAGEMENT | Facility: CLINIC | Age: 46
End: 2021-12-21

## 2021-12-21 ENCOUNTER — OUTPATIENT (OUTPATIENT)
Dept: OUTPATIENT SERVICES | Facility: HOSPITAL | Age: 46
LOS: 1 days | Discharge: HOME | End: 2021-12-21

## 2021-12-21 VITALS — RESPIRATION RATE: 16 BRPM | OXYGEN SATURATION: 99 % | HEART RATE: 76 BPM

## 2021-12-21 DIAGNOSIS — Z95.2 PRESENCE OF PROSTHETIC HEART VALVE: Chronic | ICD-10-CM

## 2021-12-21 DIAGNOSIS — Z98.890 OTHER SPECIFIED POSTPROCEDURAL STATES: Chronic | ICD-10-CM

## 2021-12-21 DIAGNOSIS — Z79.01 ENCOUNTER FOR THERAPEUTIC DRUG LVL MONITORING: ICD-10-CM

## 2021-12-21 DIAGNOSIS — Z51.81 ENCOUNTER FOR THERAPEUTIC DRUG LVL MONITORING: ICD-10-CM

## 2021-12-21 DIAGNOSIS — Z95.2 PRESENCE OF PROSTHETIC HEART VALVE: ICD-10-CM

## 2021-12-21 DIAGNOSIS — Z79.01 LONG TERM (CURRENT) USE OF ANTICOAGULANTS: ICD-10-CM

## 2021-12-21 LAB
INR PPP: 5.6 RATIO
POCT-PROTHROMBIN TIME: 67.5 SECS
QUALITY CONTROL: YES

## 2021-12-23 ENCOUNTER — OUTPATIENT (OUTPATIENT)
Dept: OUTPATIENT SERVICES | Facility: HOSPITAL | Age: 46
LOS: 1 days | Discharge: HOME | End: 2021-12-23

## 2021-12-23 ENCOUNTER — APPOINTMENT (OUTPATIENT)
Dept: MEDICATION MANAGEMENT | Facility: CLINIC | Age: 46
End: 2021-12-23

## 2021-12-23 DIAGNOSIS — Z95.2 PRESENCE OF PROSTHETIC HEART VALVE: Chronic | ICD-10-CM

## 2021-12-23 DIAGNOSIS — Z98.890 OTHER SPECIFIED POSTPROCEDURAL STATES: Chronic | ICD-10-CM

## 2021-12-23 DIAGNOSIS — Z95.2 PRESENCE OF PROSTHETIC HEART VALVE: ICD-10-CM

## 2021-12-23 DIAGNOSIS — Z79.01 LONG TERM (CURRENT) USE OF ANTICOAGULANTS: ICD-10-CM

## 2021-12-23 LAB
INR PPP: 2.2 RATIO
POCT-PROTHROMBIN TIME: 26.1 SECS
QUALITY CONTROL: YES

## 2021-12-27 ENCOUNTER — APPOINTMENT (OUTPATIENT)
Dept: MEDICATION MANAGEMENT | Facility: CLINIC | Age: 46
End: 2021-12-27

## 2021-12-27 ENCOUNTER — OUTPATIENT (OUTPATIENT)
Dept: OUTPATIENT SERVICES | Facility: HOSPITAL | Age: 46
LOS: 1 days | Discharge: HOME | End: 2021-12-27

## 2021-12-27 DIAGNOSIS — Z98.890 OTHER SPECIFIED POSTPROCEDURAL STATES: Chronic | ICD-10-CM

## 2021-12-27 DIAGNOSIS — Z79.01 LONG TERM (CURRENT) USE OF ANTICOAGULANTS: ICD-10-CM

## 2021-12-27 DIAGNOSIS — Z95.2 PRESENCE OF PROSTHETIC HEART VALVE: Chronic | ICD-10-CM

## 2021-12-27 DIAGNOSIS — Z95.2 PRESENCE OF PROSTHETIC HEART VALVE: ICD-10-CM

## 2021-12-27 LAB
INR PPP: 3.2 RATIO
POCT-PROTHROMBIN TIME: 38.2 SECS
QUALITY CONTROL: YES

## 2022-01-10 ENCOUNTER — APPOINTMENT (OUTPATIENT)
Dept: MEDICATION MANAGEMENT | Facility: CLINIC | Age: 47
End: 2022-01-10

## 2022-01-10 ENCOUNTER — OUTPATIENT (OUTPATIENT)
Dept: OUTPATIENT SERVICES | Facility: HOSPITAL | Age: 47
LOS: 1 days | Discharge: HOME | End: 2022-01-10

## 2022-01-10 VITALS — OXYGEN SATURATION: 98 % | HEART RATE: 72 BPM | RESPIRATION RATE: 16 BRPM

## 2022-01-10 DIAGNOSIS — Z95.2 PRESENCE OF PROSTHETIC HEART VALVE: ICD-10-CM

## 2022-01-10 DIAGNOSIS — Z98.890 OTHER SPECIFIED POSTPROCEDURAL STATES: Chronic | ICD-10-CM

## 2022-01-10 DIAGNOSIS — Z95.2 PRESENCE OF PROSTHETIC HEART VALVE: Chronic | ICD-10-CM

## 2022-01-10 DIAGNOSIS — Z79.01 LONG TERM (CURRENT) USE OF ANTICOAGULANTS: ICD-10-CM

## 2022-01-10 LAB
INR PPP: 2.8 RATIO
POCT-PROTHROMBIN TIME: 33.8 SECS
QUALITY CONTROL: YES

## 2022-02-07 ENCOUNTER — APPOINTMENT (OUTPATIENT)
Dept: MEDICATION MANAGEMENT | Facility: CLINIC | Age: 47
End: 2022-02-07

## 2022-02-07 ENCOUNTER — OUTPATIENT (OUTPATIENT)
Dept: OUTPATIENT SERVICES | Facility: HOSPITAL | Age: 47
LOS: 1 days | Discharge: HOME | End: 2022-02-07

## 2022-02-07 DIAGNOSIS — Z95.2 PRESENCE OF PROSTHETIC HEART VALVE: Chronic | ICD-10-CM

## 2022-02-07 DIAGNOSIS — Z98.890 OTHER SPECIFIED POSTPROCEDURAL STATES: Chronic | ICD-10-CM

## 2022-02-07 DIAGNOSIS — Z95.2 PRESENCE OF PROSTHETIC HEART VALVE: ICD-10-CM

## 2022-02-07 DIAGNOSIS — Z79.01 LONG TERM (CURRENT) USE OF ANTICOAGULANTS: ICD-10-CM

## 2022-02-07 LAB
INR PPP: 3 RATIO
POCT-PROTHROMBIN TIME: 35.7 SECS
QUALITY CONTROL: YES

## 2022-02-09 ENCOUNTER — APPOINTMENT (OUTPATIENT)
Dept: CARDIOLOGY | Facility: CLINIC | Age: 47
End: 2022-02-09
Payer: COMMERCIAL

## 2022-02-09 ENCOUNTER — NON-APPOINTMENT (OUTPATIENT)
Age: 47
End: 2022-02-09

## 2022-02-09 PROCEDURE — 93296 REM INTERROG EVL PM/IDS: CPT

## 2022-02-09 PROCEDURE — 93295 DEV INTERROG REMOTE 1/2/MLT: CPT

## 2022-02-25 ENCOUNTER — APPOINTMENT (OUTPATIENT)
Dept: CARDIOLOGY | Facility: CLINIC | Age: 47
End: 2022-02-25

## 2022-03-11 ENCOUNTER — OUTPATIENT (OUTPATIENT)
Dept: OUTPATIENT SERVICES | Facility: HOSPITAL | Age: 47
LOS: 1 days | Discharge: HOME | End: 2022-03-11

## 2022-03-11 ENCOUNTER — APPOINTMENT (OUTPATIENT)
Dept: MEDICATION MANAGEMENT | Facility: CLINIC | Age: 47
End: 2022-03-11

## 2022-03-11 VITALS — RESPIRATION RATE: 16 BRPM | OXYGEN SATURATION: 96 % | HEART RATE: 65 BPM

## 2022-03-11 DIAGNOSIS — Z98.890 OTHER SPECIFIED POSTPROCEDURAL STATES: Chronic | ICD-10-CM

## 2022-03-11 DIAGNOSIS — Z95.2 PRESENCE OF PROSTHETIC HEART VALVE: Chronic | ICD-10-CM

## 2022-03-11 DIAGNOSIS — Z95.2 PRESENCE OF PROSTHETIC HEART VALVE: ICD-10-CM

## 2022-03-11 DIAGNOSIS — Z79.01 LONG TERM (CURRENT) USE OF ANTICOAGULANTS: ICD-10-CM

## 2022-03-11 LAB
INR PPP: 1.9 RATIO
POCT-PROTHROMBIN TIME: 23.3 SECS
QUALITY CONTROL: YES

## 2022-03-16 ENCOUNTER — APPOINTMENT (OUTPATIENT)
Dept: CARDIOLOGY | Facility: CLINIC | Age: 47
End: 2022-03-16
Payer: COMMERCIAL

## 2022-03-16 VITALS
BODY MASS INDEX: 29.57 KG/M2 | DIASTOLIC BLOOD PRESSURE: 79 MMHG | WEIGHT: 184 LBS | HEIGHT: 66 IN | HEART RATE: 76 BPM | TEMPERATURE: 97.7 F | SYSTOLIC BLOOD PRESSURE: 117 MMHG

## 2022-03-16 PROCEDURE — 99214 OFFICE O/P EST MOD 30 MIN: CPT | Mod: 25

## 2022-03-16 PROCEDURE — 93000 ELECTROCARDIOGRAM COMPLETE: CPT | Mod: 59

## 2022-03-16 PROCEDURE — 93260 PRGRMG DEV EVAL IMPLTBL SYS: CPT

## 2022-03-16 NOTE — PROCEDURE
[No] : not [NSR] : normal sinus rhythm [ICD] : Implantable cardioverter-defibrillator [Impedance: ___ Ohms] : current cell impedance is [unfilled] Ohms [Programmed for Longevity] : output reprogrammed for improved battery longevity [See Device Printout] : See device printout [Threshold Testing Performed] : Threshold testing was not performed [de-identified] : [facility] [de-identified] : A219 [de-identified] : [id number] [de-identified] : 1/3/2019 [de-identified] : 89 [de-identified] : Normal Sub-Q Device Function

## 2022-03-16 NOTE — HISTORY OF PRESENT ILLNESS
[de-identified] : \par Cardiologist: Dr. 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 Patient states that was the first time he was ever informed of cardiomyopathy. He was experiencing heart failure symptoms including dyspnea at rest and PND. He was prescribed Entresto but couldn't afford it due to insurance issues. Since discharge, he took his meds as prescribed. He wore the LifeVest and is now s/p subcutaneous ICD 1/3/2019. He has a remote monitor set up. He has not been shocked by his device. Lost 10 lb last year with keto diet. \par \par He is here for routine device check. He denies chest pain, dyspnea, dizziness, lightheadedness, presyncope or syncope. No PND or lower extremity edema. Stable 2 pillow orthopnea.

## 2022-03-16 NOTE — CARDIOLOGY SUMMARY
[de-identified] : 3/16/2022 NSR (HR 76 bpm) [de-identified] : 8/19/2020 EF 50-55% LAE. Mechanical aortic and mitral valve. \par 10/29/18 Dilated LV; mild LVH; Mechanical Mitral and mechanical aortic valve with mild AI. LVEF 25%. \par  [de-identified] : 6/30/17, Patent coronary arteries

## 2022-03-16 NOTE — ASSESSMENT
[FreeTextEntry1] : Mr. Wooten presents for routine follow up of subcutaneous ICD, which was implanted for primary prevention for dilated cardiomyopathy. \par \par # DCM s/p subcutaneous ICD\par - Advisory information reviewed with pt\par - Interrogationa s above\par - Remote monitor is set up and patient is transmitting.\par \par # Mechanical Aortic Valve\par - Cont coumadin. No s/sx of bleeding. Follows up with coumadin clinic\par \par I have also advised the patient to go to the nearest emergency room if he experiences any chest pain, dyspnea, syncope, or has any other compelling symptoms.\par \par Follow up in 3 mo for in office interrogation with NP

## 2022-03-16 NOTE — PHYSICAL EXAM
[General Appearance - Well Developed] : well developed [Normal Appearance] : normal appearance [Well Groomed] : well groomed [General Appearance - Well Nourished] : well nourished [No Deformities] : no deformities [General Appearance - In No Acute Distress] : no acute distress [Heart Rate And Rhythm] : heart rate and rhythm were normal [Heart Sounds] : normal S1 and S2 [Murmurs] : no murmurs present [Edema] : no peripheral edema present [] : no respiratory distress [Respiration, Rhythm And Depth] : normal respiratory rhythm and effort [Exaggerated Use Of Accessory Muscles For Inspiration] : no accessory muscle use [Auscultation Breath Sounds / Voice Sounds] : lungs were clear to auscultation bilaterally [Well-Healed] : well-healed [Abdomen Soft] : soft [Nail Clubbing] : no clubbing of the fingernails [Cyanosis, Localized] : no localized cyanosis [Erythema] : not erythematous [Warm] : not warm [Tender] : not tender [FreeTextEntry1] : Left sided axillary and subxiphoid incisions are well healed

## 2022-03-18 ENCOUNTER — APPOINTMENT (OUTPATIENT)
Dept: MEDICATION MANAGEMENT | Facility: CLINIC | Age: 47
End: 2022-03-18

## 2022-03-18 ENCOUNTER — OUTPATIENT (OUTPATIENT)
Dept: OUTPATIENT SERVICES | Facility: HOSPITAL | Age: 47
LOS: 1 days | Discharge: HOME | End: 2022-03-18

## 2022-03-18 DIAGNOSIS — Z98.890 OTHER SPECIFIED POSTPROCEDURAL STATES: Chronic | ICD-10-CM

## 2022-03-18 DIAGNOSIS — Z79.01 LONG TERM (CURRENT) USE OF ANTICOAGULANTS: ICD-10-CM

## 2022-03-18 DIAGNOSIS — Z95.2 PRESENCE OF PROSTHETIC HEART VALVE: ICD-10-CM

## 2022-03-18 DIAGNOSIS — Z95.2 PRESENCE OF PROSTHETIC HEART VALVE: Chronic | ICD-10-CM

## 2022-03-18 LAB
INR PPP: 2.1 RATIO
POCT-PROTHROMBIN TIME: 25.6 SECS
QUALITY CONTROL: YES

## 2022-03-25 ENCOUNTER — OUTPATIENT (OUTPATIENT)
Dept: OUTPATIENT SERVICES | Facility: HOSPITAL | Age: 47
LOS: 1 days | Discharge: HOME | End: 2022-03-25

## 2022-03-25 ENCOUNTER — APPOINTMENT (OUTPATIENT)
Dept: MEDICATION MANAGEMENT | Facility: CLINIC | Age: 47
End: 2022-03-25

## 2022-03-25 DIAGNOSIS — Z98.890 OTHER SPECIFIED POSTPROCEDURAL STATES: Chronic | ICD-10-CM

## 2022-03-25 DIAGNOSIS — Z79.01 LONG TERM (CURRENT) USE OF ANTICOAGULANTS: ICD-10-CM

## 2022-03-25 DIAGNOSIS — Z95.2 PRESENCE OF PROSTHETIC HEART VALVE: Chronic | ICD-10-CM

## 2022-03-25 DIAGNOSIS — Z95.2 PRESENCE OF PROSTHETIC HEART VALVE: ICD-10-CM

## 2022-03-25 LAB
INR PPP: 2.6 RATIO
POCT-PROTHROMBIN TIME: 30.8 SECS
QUALITY CONTROL: YES

## 2022-04-08 ENCOUNTER — OUTPATIENT (OUTPATIENT)
Dept: OUTPATIENT SERVICES | Facility: HOSPITAL | Age: 47
LOS: 1 days | Discharge: HOME | End: 2022-04-08

## 2022-04-08 ENCOUNTER — APPOINTMENT (OUTPATIENT)
Dept: MEDICATION MANAGEMENT | Facility: CLINIC | Age: 47
End: 2022-04-08

## 2022-04-08 DIAGNOSIS — Z79.01 LONG TERM (CURRENT) USE OF ANTICOAGULANTS: ICD-10-CM

## 2022-04-08 DIAGNOSIS — Z95.2 PRESENCE OF PROSTHETIC HEART VALVE: Chronic | ICD-10-CM

## 2022-04-08 DIAGNOSIS — Z98.890 OTHER SPECIFIED POSTPROCEDURAL STATES: Chronic | ICD-10-CM

## 2022-04-08 DIAGNOSIS — Z95.2 PRESENCE OF PROSTHETIC HEART VALVE: ICD-10-CM

## 2022-04-08 LAB
INR PPP: 3.1 RATIO
POCT-PROTHROMBIN TIME: 36.8 SECS
QUALITY CONTROL: YES

## 2022-04-22 ENCOUNTER — APPOINTMENT (OUTPATIENT)
Dept: MEDICATION MANAGEMENT | Facility: CLINIC | Age: 47
End: 2022-04-22

## 2022-04-22 ENCOUNTER — OUTPATIENT (OUTPATIENT)
Dept: OUTPATIENT SERVICES | Facility: HOSPITAL | Age: 47
LOS: 1 days | Discharge: HOME | End: 2022-04-22

## 2022-04-22 DIAGNOSIS — Z95.2 PRESENCE OF PROSTHETIC HEART VALVE: ICD-10-CM

## 2022-04-22 DIAGNOSIS — Z98.890 OTHER SPECIFIED POSTPROCEDURAL STATES: Chronic | ICD-10-CM

## 2022-04-22 DIAGNOSIS — Z95.2 PRESENCE OF PROSTHETIC HEART VALVE: Chronic | ICD-10-CM

## 2022-04-22 DIAGNOSIS — Z79.01 LONG TERM (CURRENT) USE OF ANTICOAGULANTS: ICD-10-CM

## 2022-04-22 LAB
INR PPP: 2.6 RATIO
POCT-PROTHROMBIN TIME: 31.1 SECS
QUALITY CONTROL: YES

## 2022-05-20 ENCOUNTER — OUTPATIENT (OUTPATIENT)
Dept: OUTPATIENT SERVICES | Facility: HOSPITAL | Age: 47
LOS: 1 days | Discharge: HOME | End: 2022-05-20

## 2022-05-20 ENCOUNTER — APPOINTMENT (OUTPATIENT)
Dept: MEDICATION MANAGEMENT | Facility: CLINIC | Age: 47
End: 2022-05-20

## 2022-05-20 DIAGNOSIS — Z98.890 OTHER SPECIFIED POSTPROCEDURAL STATES: Chronic | ICD-10-CM

## 2022-05-20 DIAGNOSIS — Z95.2 PRESENCE OF PROSTHETIC HEART VALVE: Chronic | ICD-10-CM

## 2022-05-20 DIAGNOSIS — Z95.2 PRESENCE OF PROSTHETIC HEART VALVE: ICD-10-CM

## 2022-05-20 DIAGNOSIS — Z79.01 LONG TERM (CURRENT) USE OF ANTICOAGULANTS: ICD-10-CM

## 2022-05-20 LAB
INR PPP: 2.3 RATIO
POCT-PROTHROMBIN TIME: 27.4 SECS
QUALITY CONTROL: YES

## 2022-05-31 ENCOUNTER — OUTPATIENT (OUTPATIENT)
Dept: OUTPATIENT SERVICES | Facility: HOSPITAL | Age: 47
LOS: 1 days | Discharge: HOME | End: 2022-05-31
Payer: COMMERCIAL

## 2022-05-31 DIAGNOSIS — M47.897 OTHER SPONDYLOSIS, LUMBOSACRAL REGION: ICD-10-CM

## 2022-05-31 DIAGNOSIS — Z95.2 PRESENCE OF PROSTHETIC HEART VALVE: Chronic | ICD-10-CM

## 2022-05-31 DIAGNOSIS — M51.26 OTHER INTERVERTEBRAL DISC DISPLACEMENT, LUMBAR REGION: ICD-10-CM

## 2022-05-31 DIAGNOSIS — M54.16 RADICULOPATHY, LUMBAR REGION: ICD-10-CM

## 2022-05-31 DIAGNOSIS — Z04.2 ENCOUNTER FOR EXAMINATION AND OBSERVATION FOLLOWING WORK ACCIDENT: ICD-10-CM

## 2022-05-31 DIAGNOSIS — Z98.890 OTHER SPECIFIED POSTPROCEDURAL STATES: Chronic | ICD-10-CM

## 2022-05-31 PROCEDURE — 72148 MRI LUMBAR SPINE W/O DYE: CPT | Mod: 26

## 2022-06-10 ENCOUNTER — OUTPATIENT (OUTPATIENT)
Dept: OUTPATIENT SERVICES | Facility: HOSPITAL | Age: 47
LOS: 1 days | Discharge: HOME | End: 2022-06-10

## 2022-06-10 ENCOUNTER — APPOINTMENT (OUTPATIENT)
Dept: MEDICATION MANAGEMENT | Facility: CLINIC | Age: 47
End: 2022-06-10

## 2022-06-10 DIAGNOSIS — Z98.890 OTHER SPECIFIED POSTPROCEDURAL STATES: Chronic | ICD-10-CM

## 2022-06-10 DIAGNOSIS — Z95.2 PRESENCE OF PROSTHETIC HEART VALVE: Chronic | ICD-10-CM

## 2022-06-10 DIAGNOSIS — Z95.2 PRESENCE OF PROSTHETIC HEART VALVE: ICD-10-CM

## 2022-06-10 DIAGNOSIS — Z79.01 LONG TERM (CURRENT) USE OF ANTICOAGULANTS: ICD-10-CM

## 2022-06-15 ENCOUNTER — APPOINTMENT (OUTPATIENT)
Dept: CARDIOLOGY | Facility: CLINIC | Age: 47
End: 2022-06-15
Payer: COMMERCIAL

## 2022-06-15 ENCOUNTER — APPOINTMENT (OUTPATIENT)
Dept: CARDIOLOGY | Facility: CLINIC | Age: 47
End: 2022-06-15

## 2022-06-15 VITALS
SYSTOLIC BLOOD PRESSURE: 127 MMHG | WEIGHT: 190 LBS | HEART RATE: 87 BPM | DIASTOLIC BLOOD PRESSURE: 81 MMHG | BODY MASS INDEX: 30.53 KG/M2 | HEIGHT: 66 IN | TEMPERATURE: 97.8 F

## 2022-06-15 PROCEDURE — 93000 ELECTROCARDIOGRAM COMPLETE: CPT | Mod: 59

## 2022-06-15 PROCEDURE — 93260 PRGRMG DEV EVAL IMPLTBL SYS: CPT

## 2022-06-15 PROCEDURE — 99214 OFFICE O/P EST MOD 30 MIN: CPT

## 2022-06-15 RX ORDER — FUROSEMIDE 20 MG/1
20 TABLET ORAL DAILY
Refills: 0 | Status: COMPLETED | COMMUNITY
End: 2022-06-15

## 2022-06-15 RX ORDER — SILDENAFIL 100 MG/1
100 TABLET, FILM COATED ORAL
Qty: 10 | Refills: 10 | Status: COMPLETED | COMMUNITY
Start: 2021-07-02 | End: 2022-06-15

## 2022-06-15 RX ORDER — ATORVASTATIN CALCIUM 20 MG/1
20 TABLET, FILM COATED ORAL
Qty: 90 | Refills: 3 | Status: COMPLETED | COMMUNITY
End: 2022-06-15

## 2022-06-15 NOTE — PROCEDURE
[No] : not [See Device Printout] : See device printout [Programmed for Longevity] : output reprogrammed for improved battery longevity [de-identified] : [facility] [de-identified] : A219 [de-identified] : [id number] [de-identified] : 01/03/2019 [de-identified] : 89 [de-identified] : 63%  [de-identified] : No new episodes to report.\par

## 2022-06-15 NOTE — HISTORY OF PRESENT ILLNESS
[de-identified] : \par Cardiologist: Dr. 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 Patient states that was the first time he was ever informed of cardiomyopathy. He was experiencing heart failure symptoms including dyspnea at rest and PND. He was prescribed Entresto but couldn't afford it due to insurance issues. Since discharge, he took his meds as prescribed. He wore LifeVest and is now s/p subcutaneous ICD 1/3/2019. He has a remote monitor set up. He has not been shocked by his device. Lost 10 lb last year with keto diet. \par \par He is here for routine device check due to advisory. He denies chest pain, dyspnea, dizziness, lightheadedness, presyncope or syncope. No PND or lower extremity edema. Stable 2 pillow orthopnea.

## 2022-06-15 NOTE — ASSESSMENT
[FreeTextEntry1] : Mr. Wooten presents for routine follow up of subcutaneous ICD, which was implanted for primary prevention for dilated cardiomyopathy. \par \par # DCM s/p subcutaneous ICD\par - Advisory information reviewed with pt\par - Interrogation as above\par - Remote monitor is set up and patient is transmitting.\par - Cont GDMT\par - Obtain stress results\par \par # Mechanical Aortic Valve\par - Cont coumadin. No s/sx of bleeding. Follows up with coumadin clinic\par \par I have also advised the patient to go to the nearest emergency room if he experiences any chest pain, dyspnea, syncope, or has any other compelling symptoms.\par \par Follow up in 3 mo for in office interrogation with NP

## 2022-06-15 NOTE — CARDIOLOGY SUMMARY
[de-identified] : 6/15/2022 NSR (HR 84 bpm)\par 3/16/2022 NSR (HR 76 bpm) [de-identified] : had one earlier today [de-identified] : 6/30/17, Patent coronary arteries  [de-identified] : 8/19/2020 EF 50-55% LAE. Mechanical aortic and mitral valve. \par 10/29/18 Dilated LV; mild LVH; Mechanical Mitral and mechanical aortic valve with mild AI. LVEF 25%. \par

## 2022-06-15 NOTE — PHYSICAL EXAM
[General Appearance - Well Developed] : well developed [Normal Appearance] : normal appearance [Well Groomed] : well groomed [General Appearance - Well Nourished] : well nourished [No Deformities] : no deformities [General Appearance - In No Acute Distress] : no acute distress [Heart Rate And Rhythm] : heart rate and rhythm were normal [Heart Sounds] : normal S1 and S2 [Edema] : no peripheral edema present [] : no respiratory distress [Respiration, Rhythm And Depth] : normal respiratory rhythm and effort [Exaggerated Use Of Accessory Muscles For Inspiration] : no accessory muscle use [Auscultation Breath Sounds / Voice Sounds] : lungs were clear to auscultation bilaterally [Well-Healed] : well-healed [Abdomen Soft] : soft [Nail Clubbing] : no clubbing of the fingernails [Cyanosis, Localized] : no localized cyanosis [Erythema] : not erythematous [Warm] : not warm [Tender] : not tender [FreeTextEntry1] : Left sided axillary and subxiphoid incisions are well healed

## 2022-06-23 ENCOUNTER — OUTPATIENT (OUTPATIENT)
Dept: OUTPATIENT SERVICES | Facility: HOSPITAL | Age: 47
LOS: 1 days | Discharge: HOME | End: 2022-06-23

## 2022-06-23 ENCOUNTER — APPOINTMENT (OUTPATIENT)
Dept: MEDICATION MANAGEMENT | Facility: CLINIC | Age: 47
End: 2022-06-23

## 2022-06-23 DIAGNOSIS — Z95.2 PRESENCE OF PROSTHETIC HEART VALVE: Chronic | ICD-10-CM

## 2022-06-23 DIAGNOSIS — Z79.01 LONG TERM (CURRENT) USE OF ANTICOAGULANTS: ICD-10-CM

## 2022-06-23 DIAGNOSIS — Z98.890 OTHER SPECIFIED POSTPROCEDURAL STATES: Chronic | ICD-10-CM

## 2022-06-23 DIAGNOSIS — Z95.2 PRESENCE OF PROSTHETIC HEART VALVE: ICD-10-CM

## 2022-06-23 LAB
INR PPP: 5.1 RATIO
POCT-PROTHROMBIN TIME: 61.3 SECS
QUALITY CONTROL: YES

## 2022-06-30 ENCOUNTER — APPOINTMENT (OUTPATIENT)
Dept: MEDICATION MANAGEMENT | Facility: CLINIC | Age: 47
End: 2022-06-30

## 2022-06-30 ENCOUNTER — OUTPATIENT (OUTPATIENT)
Dept: OUTPATIENT SERVICES | Facility: HOSPITAL | Age: 47
LOS: 1 days | Discharge: HOME | End: 2022-06-30

## 2022-06-30 DIAGNOSIS — Z95.2 PRESENCE OF PROSTHETIC HEART VALVE: Chronic | ICD-10-CM

## 2022-06-30 DIAGNOSIS — Z95.2 PRESENCE OF PROSTHETIC HEART VALVE: ICD-10-CM

## 2022-06-30 DIAGNOSIS — Z98.890 OTHER SPECIFIED POSTPROCEDURAL STATES: Chronic | ICD-10-CM

## 2022-06-30 DIAGNOSIS — Z79.01 LONG TERM (CURRENT) USE OF ANTICOAGULANTS: ICD-10-CM

## 2022-06-30 LAB
INR PPP: 3.2 RATIO
POCT-PROTHROMBIN TIME: 38.4 SECS
QUALITY CONTROL: YES

## 2022-07-14 ENCOUNTER — APPOINTMENT (OUTPATIENT)
Dept: MEDICATION MANAGEMENT | Facility: CLINIC | Age: 47
End: 2022-07-14

## 2022-07-14 ENCOUNTER — OUTPATIENT (OUTPATIENT)
Dept: OUTPATIENT SERVICES | Facility: HOSPITAL | Age: 47
LOS: 1 days | Discharge: HOME | End: 2022-07-14

## 2022-07-14 DIAGNOSIS — Z79.01 LONG TERM (CURRENT) USE OF ANTICOAGULANTS: ICD-10-CM

## 2022-07-14 DIAGNOSIS — Z95.2 PRESENCE OF PROSTHETIC HEART VALVE: Chronic | ICD-10-CM

## 2022-07-14 DIAGNOSIS — Z98.890 OTHER SPECIFIED POSTPROCEDURAL STATES: Chronic | ICD-10-CM

## 2022-07-14 DIAGNOSIS — Z95.2 PRESENCE OF PROSTHETIC HEART VALVE: ICD-10-CM

## 2022-07-14 LAB
INR PPP: 4.3 RATIO
POCT-PROTHROMBIN TIME: 51.2 SECS
QUALITY CONTROL: YES

## 2022-07-29 ENCOUNTER — OUTPATIENT (OUTPATIENT)
Dept: OUTPATIENT SERVICES | Facility: HOSPITAL | Age: 47
LOS: 1 days | Discharge: HOME | End: 2022-07-29

## 2022-07-29 ENCOUNTER — APPOINTMENT (OUTPATIENT)
Dept: MEDICATION MANAGEMENT | Facility: CLINIC | Age: 47
End: 2022-07-29

## 2022-07-29 DIAGNOSIS — Z95.2 PRESENCE OF PROSTHETIC HEART VALVE: Chronic | ICD-10-CM

## 2022-07-29 DIAGNOSIS — Z95.2 PRESENCE OF PROSTHETIC HEART VALVE: ICD-10-CM

## 2022-07-29 DIAGNOSIS — Z98.890 OTHER SPECIFIED POSTPROCEDURAL STATES: Chronic | ICD-10-CM

## 2022-07-29 DIAGNOSIS — Z79.01 LONG TERM (CURRENT) USE OF ANTICOAGULANTS: ICD-10-CM

## 2022-08-15 ENCOUNTER — OUTPATIENT (OUTPATIENT)
Dept: OUTPATIENT SERVICES | Facility: HOSPITAL | Age: 47
LOS: 1 days | Discharge: HOME | End: 2022-08-15

## 2022-08-15 ENCOUNTER — APPOINTMENT (OUTPATIENT)
Dept: MEDICATION MANAGEMENT | Facility: CLINIC | Age: 47
End: 2022-08-15

## 2022-08-15 DIAGNOSIS — Z95.2 PRESENCE OF PROSTHETIC HEART VALVE: Chronic | ICD-10-CM

## 2022-08-15 DIAGNOSIS — Z79.01 LONG TERM (CURRENT) USE OF ANTICOAGULANTS: ICD-10-CM

## 2022-08-15 DIAGNOSIS — Z98.890 OTHER SPECIFIED POSTPROCEDURAL STATES: Chronic | ICD-10-CM

## 2022-08-15 DIAGNOSIS — Z95.2 PRESENCE OF PROSTHETIC HEART VALVE: ICD-10-CM

## 2022-08-15 LAB
INR PPP: 3.6 RATIO
POCT-PROTHROMBIN TIME: 43.4 SECS
QUALITY CONTROL: YES

## 2022-09-01 ENCOUNTER — APPOINTMENT (OUTPATIENT)
Dept: MEDICATION MANAGEMENT | Facility: CLINIC | Age: 47
End: 2022-09-01

## 2022-09-01 ENCOUNTER — OUTPATIENT (OUTPATIENT)
Dept: OUTPATIENT SERVICES | Facility: HOSPITAL | Age: 47
LOS: 1 days | Discharge: HOME | End: 2022-09-01

## 2022-09-01 DIAGNOSIS — Z98.890 OTHER SPECIFIED POSTPROCEDURAL STATES: Chronic | ICD-10-CM

## 2022-09-01 DIAGNOSIS — Z79.01 LONG TERM (CURRENT) USE OF ANTICOAGULANTS: ICD-10-CM

## 2022-09-01 DIAGNOSIS — Z95.2 PRESENCE OF PROSTHETIC HEART VALVE: Chronic | ICD-10-CM

## 2022-09-01 DIAGNOSIS — Z95.2 PRESENCE OF PROSTHETIC HEART VALVE: ICD-10-CM

## 2022-09-14 ENCOUNTER — NON-APPOINTMENT (OUTPATIENT)
Age: 47
End: 2022-09-14

## 2022-09-14 ENCOUNTER — APPOINTMENT (OUTPATIENT)
Dept: CARDIOLOGY | Facility: CLINIC | Age: 47
End: 2022-09-14

## 2022-09-14 PROCEDURE — 93295 DEV INTERROG REMOTE 1/2/MLT: CPT

## 2022-09-14 PROCEDURE — 93296 REM INTERROG EVL PM/IDS: CPT

## 2022-10-05 ENCOUNTER — APPOINTMENT (OUTPATIENT)
Dept: CARDIOLOGY | Facility: CLINIC | Age: 47
End: 2022-10-05

## 2022-10-05 ENCOUNTER — OUTPATIENT (OUTPATIENT)
Dept: OUTPATIENT SERVICES | Facility: HOSPITAL | Age: 47
LOS: 1 days | Discharge: HOME | End: 2022-10-05

## 2022-10-05 ENCOUNTER — APPOINTMENT (OUTPATIENT)
Dept: MEDICATION MANAGEMENT | Facility: CLINIC | Age: 47
End: 2022-10-05

## 2022-10-05 VITALS
HEIGHT: 66 IN | RESPIRATION RATE: 16 BRPM | HEART RATE: 84 BPM | WEIGHT: 192 LBS | DIASTOLIC BLOOD PRESSURE: 78 MMHG | BODY MASS INDEX: 30.86 KG/M2 | TEMPERATURE: 97.7 F | SYSTOLIC BLOOD PRESSURE: 120 MMHG

## 2022-10-05 DIAGNOSIS — Z79.01 LONG TERM (CURRENT) USE OF ANTICOAGULANTS: ICD-10-CM

## 2022-10-05 DIAGNOSIS — Z98.890 OTHER SPECIFIED POSTPROCEDURAL STATES: Chronic | ICD-10-CM

## 2022-10-05 DIAGNOSIS — Z95.2 PRESENCE OF PROSTHETIC HEART VALVE: ICD-10-CM

## 2022-10-05 DIAGNOSIS — Z95.2 PRESENCE OF PROSTHETIC HEART VALVE: Chronic | ICD-10-CM

## 2022-10-05 LAB
INR PPP: 2.2 RATIO
POCT-PROTHROMBIN TIME: 25.9 SECS
QUALITY CONTROL: YES

## 2022-10-05 PROCEDURE — 93260 PRGRMG DEV EVAL IMPLTBL SYS: CPT

## 2022-10-05 PROCEDURE — 99213 OFFICE O/P EST LOW 20 MIN: CPT | Mod: 25

## 2022-10-05 PROCEDURE — 93000 ELECTROCARDIOGRAM COMPLETE: CPT | Mod: 59

## 2022-10-05 NOTE — PROCEDURE
[NSR] : normal sinus rhythm [ICD] : Implantable cardioverter-defibrillator [Normal] : The battery status is normal. [None] : none [See Device Printout] : See device printout [de-identified] : [facility]  [de-identified] : A219 [de-identified] : [id number] [de-identified] : 01/03/2019 [de-identified] : 59% [de-identified] : [employer]SC[employer] SICD \par Primary Sensing Configuration \par System Impedance 85 ohms\par 0% AF \par No episodes recorded\par Lead on advisory\par Manual sense all vectors performed with provocative maneuvers

## 2022-10-05 NOTE — HISTORY OF PRESENT ILLNESS
[de-identified] : \par Cardiologist: Dr. 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 Patient states that was the first time he was ever informed of cardiomyopathy. He was experiencing heart failure symptoms including dyspnea at rest and PND. He was prescribed Entresto but couldn't afford it due to insurance issues. Since discharge, he took his meds as prescribed. He wore LifeVest and is now s/p subcutaneous ICD 1/3/2019. He has a remote monitor set up. He has not been shocked by his device. Lost 10 lb last year with keto diet. \par \par He is here for routine device check due to advisory. He denies chest pain, dyspnea, dizziness, lightheadedness, presyncope or syncope. No PND or lower extremity edema. Stable 2 pillow orthopnea.

## 2022-10-05 NOTE — ASSESSMENT
[FreeTextEntry1] : Mr. Wooten presents for routine follow up of subcutaneous ICD, which was implanted for primary prevention for dilated cardiomyopathy. \par \par # DCM s/p subcutaneous ICD\par - Lead on advisory\par - Interrogation as above\par - Remote monitor is set up and patient is transmitting.\par - Cont GDMT\par \par # Mechanical Aortic Valve\par - Cont coumadin. No s/sx of bleeding. Follows up with coumadin clinic\par \par I have also advised the patient to go to the nearest emergency room if he experiences any chest pain, dyspnea, syncope, or has any other compelling symptoms.\par \par Follow up in 6 months for in office interrogation with NP

## 2022-10-05 NOTE — CARDIOLOGY SUMMARY
[de-identified] : 10/5/2022: SR 84 bpm, non-sp T wave abnormalities\par 6/15/2022 NSR (HR 84 bpm)\par 3/16/2022 NSR (HR 76 bpm) [de-identified] : 7/13/2022: EF 55%, mechanical aortic and mitral valve in place\par 8/19/2020 EF 50-55% LAE. Mechanical aortic and mitral valve. \par 10/29/18 Dilated LV; mild LVH; Mechanical Mitral and mechanical aortic valve with mild AI. LVEF 25%. \par  [de-identified] : 6/30/17, Patent coronary arteries

## 2022-10-20 ENCOUNTER — OUTPATIENT (OUTPATIENT)
Dept: OUTPATIENT SERVICES | Facility: HOSPITAL | Age: 47
LOS: 1 days | Discharge: HOME | End: 2022-10-20

## 2022-10-20 ENCOUNTER — APPOINTMENT (OUTPATIENT)
Dept: MEDICATION MANAGEMENT | Facility: CLINIC | Age: 47
End: 2022-10-20

## 2022-10-20 DIAGNOSIS — Z79.01 LONG TERM (CURRENT) USE OF ANTICOAGULANTS: ICD-10-CM

## 2022-10-20 DIAGNOSIS — Z95.2 PRESENCE OF PROSTHETIC HEART VALVE: Chronic | ICD-10-CM

## 2022-10-20 DIAGNOSIS — Z95.2 PRESENCE OF PROSTHETIC HEART VALVE: ICD-10-CM

## 2022-10-20 DIAGNOSIS — Z98.890 OTHER SPECIFIED POSTPROCEDURAL STATES: Chronic | ICD-10-CM

## 2022-11-10 ENCOUNTER — OUTPATIENT (OUTPATIENT)
Dept: OUTPATIENT SERVICES | Facility: HOSPITAL | Age: 47
LOS: 1 days | Discharge: HOME | End: 2022-11-10

## 2022-11-10 ENCOUNTER — APPOINTMENT (OUTPATIENT)
Dept: MEDICATION MANAGEMENT | Facility: CLINIC | Age: 47
End: 2022-11-10

## 2022-11-10 DIAGNOSIS — Z95.2 PRESENCE OF PROSTHETIC HEART VALVE: Chronic | ICD-10-CM

## 2022-11-10 DIAGNOSIS — Z79.01 LONG TERM (CURRENT) USE OF ANTICOAGULANTS: ICD-10-CM

## 2022-11-10 DIAGNOSIS — Z98.890 OTHER SPECIFIED POSTPROCEDURAL STATES: Chronic | ICD-10-CM

## 2022-11-10 DIAGNOSIS — Z95.2 PRESENCE OF PROSTHETIC HEART VALVE: ICD-10-CM

## 2022-11-18 ENCOUNTER — APPOINTMENT (OUTPATIENT)
Dept: UROLOGY | Facility: CLINIC | Age: 47
End: 2022-11-18

## 2022-12-08 ENCOUNTER — APPOINTMENT (OUTPATIENT)
Dept: MEDICATION MANAGEMENT | Facility: CLINIC | Age: 47
End: 2022-12-08

## 2022-12-08 ENCOUNTER — OUTPATIENT (OUTPATIENT)
Dept: OUTPATIENT SERVICES | Facility: HOSPITAL | Age: 47
LOS: 1 days | Discharge: HOME | End: 2022-12-08

## 2022-12-08 DIAGNOSIS — Z95.2 PRESENCE OF PROSTHETIC HEART VALVE: Chronic | ICD-10-CM

## 2022-12-08 DIAGNOSIS — Z98.890 OTHER SPECIFIED POSTPROCEDURAL STATES: Chronic | ICD-10-CM

## 2022-12-08 DIAGNOSIS — Z79.01 LONG TERM (CURRENT) USE OF ANTICOAGULANTS: ICD-10-CM

## 2022-12-08 DIAGNOSIS — Z95.2 PRESENCE OF PROSTHETIC HEART VALVE: ICD-10-CM

## 2022-12-09 LAB
INR PPP: 2.7 RATIO
POCT-PROTHROMBIN TIME: 32.6 SECS
QUALITY CONTROL: YES

## 2022-12-14 ENCOUNTER — NON-APPOINTMENT (OUTPATIENT)
Age: 47
End: 2022-12-14

## 2022-12-14 ENCOUNTER — APPOINTMENT (OUTPATIENT)
Dept: CARDIOLOGY | Facility: CLINIC | Age: 47
End: 2022-12-14

## 2022-12-14 PROCEDURE — 93295 DEV INTERROG REMOTE 1/2/MLT: CPT

## 2022-12-14 PROCEDURE — 93296 REM INTERROG EVL PM/IDS: CPT

## 2023-01-05 ENCOUNTER — OUTPATIENT (OUTPATIENT)
Dept: OUTPATIENT SERVICES | Facility: HOSPITAL | Age: 48
LOS: 1 days | Discharge: HOME | End: 2023-01-05

## 2023-01-05 ENCOUNTER — APPOINTMENT (OUTPATIENT)
Dept: MEDICATION MANAGEMENT | Facility: CLINIC | Age: 48
End: 2023-01-05

## 2023-01-05 DIAGNOSIS — Z98.890 OTHER SPECIFIED POSTPROCEDURAL STATES: Chronic | ICD-10-CM

## 2023-01-05 DIAGNOSIS — Z95.2 PRESENCE OF PROSTHETIC HEART VALVE: Chronic | ICD-10-CM

## 2023-01-05 DIAGNOSIS — Z95.2 PRESENCE OF PROSTHETIC HEART VALVE: ICD-10-CM

## 2023-01-05 DIAGNOSIS — Z79.01 LONG TERM (CURRENT) USE OF ANTICOAGULANTS: ICD-10-CM

## 2023-01-05 LAB
INR PPP: 3 RATIO
POCT-PROTHROMBIN TIME: 36.1 SECS
QUALITY CONTROL: YES

## 2023-01-26 NOTE — DISCHARGE NOTE ADULT - NS AS DC VTE INSTRUCTION
Coumadin/Warfarin - Compliance.../Coumadin/Warfarin - Dietary Advice.../Coumadin/Warfarin - Follow-up monitoring.../Coumadin/Warfarin - Potential for adverse drug reactions and interactions 2.16

## 2023-01-27 ENCOUNTER — APPOINTMENT (OUTPATIENT)
Age: 48
End: 2023-01-27
Payer: COMMERCIAL

## 2023-01-27 VITALS
BODY MASS INDEX: 31.82 KG/M2 | WEIGHT: 198 LBS | OXYGEN SATURATION: 97 % | HEIGHT: 66 IN | DIASTOLIC BLOOD PRESSURE: 68 MMHG | RESPIRATION RATE: 14 BRPM | SYSTOLIC BLOOD PRESSURE: 110 MMHG | HEART RATE: 72 BPM

## 2023-01-27 DIAGNOSIS — E66.9 OBESITY, UNSPECIFIED: ICD-10-CM

## 2023-01-27 DIAGNOSIS — G47.33 OBSTRUCTIVE SLEEP APNEA (ADULT) (PEDIATRIC): ICD-10-CM

## 2023-01-27 PROCEDURE — 99203 OFFICE O/P NEW LOW 30 MIN: CPT

## 2023-01-27 NOTE — REASON FOR VISIT
[Initial] : an initial visit [Sleep Evaluation] : sleep evaluation [TextBox_44] : Patient presents for evaluation of loud snoring, restlessness and increased bathroom trips during the night.  He had a history of a cardiomyopathy where he needed to have a defibrillator placed.  He states that recently her heart function has been improving but the swelling has worsened.  The patient carries all the signs and symptoms suggestive of significant obstructive sleep apnea.

## 2023-01-27 NOTE — ASSESSMENT
[FreeTextEntry1] : Assessment: \par There is a high clinical suspicion for KARL,  the patient has classic signs of obstructive sleep apnea.\par Obesity\par \par Plan:\par I will order attended split night sleep testing and I will see the patient  in F/U to arrange for therapies as needed.\par Weight loss was discussed with the patient. \par The patient was counseled against driving in a sleepy state\par

## 2023-01-27 NOTE — HISTORY OF PRESENT ILLNESS
[TextBox_4] : The patient presents for the evaluation of loud snoring and restlessness at night.\par The patient is restless during sleep and has frequent nocturia.\par During the day there are episodes on increased sleepiness.\par

## 2023-02-02 ENCOUNTER — OUTPATIENT (OUTPATIENT)
Dept: OUTPATIENT SERVICES | Facility: HOSPITAL | Age: 48
LOS: 1 days | Discharge: HOME | End: 2023-02-02

## 2023-02-02 ENCOUNTER — APPOINTMENT (OUTPATIENT)
Dept: MEDICATION MANAGEMENT | Facility: CLINIC | Age: 48
End: 2023-02-02

## 2023-02-02 DIAGNOSIS — Z95.2 PRESENCE OF PROSTHETIC HEART VALVE: Chronic | ICD-10-CM

## 2023-02-02 DIAGNOSIS — Z98.890 OTHER SPECIFIED POSTPROCEDURAL STATES: Chronic | ICD-10-CM

## 2023-02-02 DIAGNOSIS — Z79.01 LONG TERM (CURRENT) USE OF ANTICOAGULANTS: ICD-10-CM

## 2023-02-02 DIAGNOSIS — Z95.2 PRESENCE OF PROSTHETIC HEART VALVE: ICD-10-CM

## 2023-02-02 LAB
INR PPP: 2.8 RATIO
POCT-PROTHROMBIN TIME: 33.3 SECS
QUALITY CONTROL: YES

## 2023-02-09 ENCOUNTER — APPOINTMENT (OUTPATIENT)
Dept: NEUROLOGY | Facility: CLINIC | Age: 48
End: 2023-02-09
Payer: COMMERCIAL

## 2023-02-09 VITALS — BODY MASS INDEX: 31.02 KG/M2 | WEIGHT: 193 LBS | HEIGHT: 66 IN

## 2023-02-09 VITALS
WEIGHT: 193 LBS | SYSTOLIC BLOOD PRESSURE: 136 MMHG | HEIGHT: 66 IN | BODY MASS INDEX: 31.02 KG/M2 | DIASTOLIC BLOOD PRESSURE: 90 MMHG | HEART RATE: 80 BPM

## 2023-02-09 PROCEDURE — 99204 OFFICE O/P NEW MOD 45 MIN: CPT

## 2023-02-09 NOTE — PHYSICAL EXAM
[FreeTextEntry1] : PHYSICAL EXAMINATION:\par Head: Normocephalic, atraumatic. Negative TA tenderness/prominence. \par \par Neck: Supple with full range of motion; nontender with negative bilateral Spurling's signs. \par \par Spine: Full range of motion; nontender. Negative straight leg raise maneuvers. \par \par Extremities: Non-tender. Atraumatic. \par \par NEUROLOGICAL EXAMINATION: \par \par Mental Status: Patient is a good informant with intact orientation, attention, concentration, recent and remote memory. Language evaluation reveals no evidence of aphasia. Fund of knowledge is normal. \par \par Cranial Nerves Cranial Nerves: \par \par II, III, IV, VI: Pupils are equal, round, and reactive to light and accommodation. No evidence of afferent pupillary defect. Visual fields are full to confrontation. Eye movements are full without evidence of nystagmus or internuclear ophthalmoplegia. Funduscopic examination reveals sharp disc margins. \par \par V: Normal jaw movements. Normal facial sensation. \par \par VII: Normal facial motor testing. \par \par VIII: Grossly normal hearing bilaterally. \par \par IX, X: Palate moves symmetrically. No dysarthria. \par \par XI: Normal shoulder shrug and sternocleidomastoid power. \par \par XII: Tongue appears normal and protrudes in the midline. \par \par Motor: Normal bulk, tone, and power throughout. \par \par Muscle Stretch Reflexes (right/left): 2+ symmetrical. Negative Tinel's signs. \par \par Plantar Responses: Flexor bilaterally. \par \par Coordination: Normal finger to nose and heel to shin testing, no truncal ataxia and no tremor. \par \par Sensation: Normal primary sensation. Normal double simultaneous stimulation. \par \par Gait and Station: Normal base, stride, and turning. Normal toe and heel walking. Normal tandem. Negative Romberg.\par

## 2023-02-09 NOTE — ASSESSMENT
[FreeTextEntry1] : Impression is that of carpal tunnel syndrome bilaterally. I ordered EMG UE for diagnostic results. We will see him back in follow up when testing is complete.\par \par Total clinician time spent today on the patient is 50 minutes including preparing to see the patient, obtaining and/or reviewing and confirming history, performing medically necessary and appropriate examination, counseling and educating the patient and/or family, documenting clinical information in the EHR and communicating and/or referring to other healthcare professionals.\par \par Entered by Ivonne Palacio acting as scribe for Dr. Cooney.\par \par \par The documentation recorded by the scribe, in my presence, accurately reflects the service I personally performed, and the decisions made by me with my edits as appropriate. \par Blair Cooney MD, FAAN, FACP\par Diplomate American Board of Psychiatry & Neurology\par \par

## 2023-02-09 NOTE — REVIEW OF SYSTEMS
[Sleep Disturbances] : sleep disturbances [Hand Weakness] :  hand weakness [Numbness] : numbness [Tingling] : tingling

## 2023-02-09 NOTE — HISTORY OF PRESENT ILLNESS
[FreeTextEntry1] : Mr. Wooten is a 47-year-old male who presents today in neurologic consultation for symptoms of numbness, tingling, and weakness in the hands and fingers bilaterally for over a year. He is not diabetic, but he did have endocarditis related to an infection in his teeth for which he had to have mitral valve and aortic valve replacement. He has a defibrillator placed from 1/2019.

## 2023-02-21 ENCOUNTER — APPOINTMENT (OUTPATIENT)
Dept: NEUROLOGY | Facility: CLINIC | Age: 48
End: 2023-02-21
Payer: COMMERCIAL

## 2023-02-21 PROCEDURE — 95886 MUSC TEST DONE W/N TEST COMP: CPT

## 2023-02-21 PROCEDURE — 95911 NRV CNDJ TEST 9-10 STUDIES: CPT

## 2023-03-02 ENCOUNTER — APPOINTMENT (OUTPATIENT)
Dept: MEDICATION MANAGEMENT | Facility: CLINIC | Age: 48
End: 2023-03-02

## 2023-03-03 ENCOUNTER — APPOINTMENT (OUTPATIENT)
Dept: MEDICATION MANAGEMENT | Facility: CLINIC | Age: 48
End: 2023-03-03

## 2023-03-07 ENCOUNTER — OUTPATIENT (OUTPATIENT)
Dept: OUTPATIENT SERVICES | Facility: HOSPITAL | Age: 48
LOS: 1 days | End: 2023-03-07
Payer: COMMERCIAL

## 2023-03-07 ENCOUNTER — APPOINTMENT (OUTPATIENT)
Dept: MEDICATION MANAGEMENT | Facility: CLINIC | Age: 48
End: 2023-03-07

## 2023-03-07 DIAGNOSIS — Z95.2 PRESENCE OF PROSTHETIC HEART VALVE: Chronic | ICD-10-CM

## 2023-03-07 DIAGNOSIS — Z79.01 LONG TERM (CURRENT) USE OF ANTICOAGULANTS: ICD-10-CM

## 2023-03-07 DIAGNOSIS — Z98.890 OTHER SPECIFIED POSTPROCEDURAL STATES: Chronic | ICD-10-CM

## 2023-03-07 DIAGNOSIS — Z95.2 PRESENCE OF PROSTHETIC HEART VALVE: ICD-10-CM

## 2023-03-07 LAB
INR PPP: 2.7 RATIO
POCT-PROTHROMBIN TIME: 31.9 SECS
QUALITY CONTROL: YES

## 2023-03-07 PROCEDURE — 36416 COLLJ CAPILLARY BLOOD SPEC: CPT

## 2023-03-07 PROCEDURE — 99211 OFF/OP EST MAY X REQ PHY/QHP: CPT

## 2023-03-07 PROCEDURE — 85610 PROTHROMBIN TIME: CPT

## 2023-03-08 DIAGNOSIS — Z95.2 PRESENCE OF PROSTHETIC HEART VALVE: ICD-10-CM

## 2023-03-08 DIAGNOSIS — Z79.01 LONG TERM (CURRENT) USE OF ANTICOAGULANTS: ICD-10-CM

## 2023-03-15 ENCOUNTER — OUTPATIENT (OUTPATIENT)
Dept: OUTPATIENT SERVICES | Facility: HOSPITAL | Age: 48
LOS: 1 days | Discharge: HOME | End: 2023-03-15
Payer: COMMERCIAL

## 2023-03-15 ENCOUNTER — APPOINTMENT (OUTPATIENT)
Dept: SLEEP CENTER | Facility: HOSPITAL | Age: 48
End: 2023-03-15

## 2023-03-15 ENCOUNTER — NON-APPOINTMENT (OUTPATIENT)
Age: 48
End: 2023-03-15

## 2023-03-15 ENCOUNTER — APPOINTMENT (OUTPATIENT)
Dept: CARDIOLOGY | Facility: CLINIC | Age: 48
End: 2023-03-15
Payer: COMMERCIAL

## 2023-03-15 ENCOUNTER — APPOINTMENT (OUTPATIENT)
Dept: SLEEP CENTER | Facility: HOSPITAL | Age: 48
End: 2023-03-15
Payer: COMMERCIAL

## 2023-03-15 DIAGNOSIS — Z95.2 PRESENCE OF PROSTHETIC HEART VALVE: Chronic | ICD-10-CM

## 2023-03-15 DIAGNOSIS — G47.33 OBSTRUCTIVE SLEEP APNEA (ADULT) (PEDIATRIC): ICD-10-CM

## 2023-03-15 DIAGNOSIS — Z98.890 OTHER SPECIFIED POSTPROCEDURAL STATES: Chronic | ICD-10-CM

## 2023-03-15 PROCEDURE — 93296 REM INTERROG EVL PM/IDS: CPT

## 2023-03-15 PROCEDURE — 95810 POLYSOM 6/> YRS 4/> PARAM: CPT | Mod: 26

## 2023-03-15 PROCEDURE — 95810 POLYSOM 6/> YRS 4/> PARAM: CPT

## 2023-03-15 PROCEDURE — 93295 DEV INTERROG REMOTE 1/2/MLT: CPT

## 2023-03-16 ENCOUNTER — APPOINTMENT (OUTPATIENT)
Dept: NEUROLOGY | Facility: CLINIC | Age: 48
End: 2023-03-16
Payer: COMMERCIAL

## 2023-03-16 VITALS
HEART RATE: 85 BPM | BODY MASS INDEX: 31.02 KG/M2 | WEIGHT: 193 LBS | HEIGHT: 66 IN | SYSTOLIC BLOOD PRESSURE: 137 MMHG | DIASTOLIC BLOOD PRESSURE: 86 MMHG

## 2023-03-16 DIAGNOSIS — G56.00 CARPAL TUNNEL SYNDROME, UNSPECIFIED UPPER LIMB: ICD-10-CM

## 2023-03-16 PROCEDURE — 99214 OFFICE O/P EST MOD 30 MIN: CPT

## 2023-03-16 NOTE — ASSESSMENT
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  We did discuss the option of injection vs surgery and he wishes to think about it over the next few weeks.  He will contact us if he decides to pursue injections.\par \par I personally reviewed with the PA, this patient's history and physical exam findings, as documented above. I have discussed the relevant areas of concern, having direct implications to the presenting problems and illnesses, and I have personally examined all pertinent and positive and negative findings, which impact on the prior neurological treatment. \par \par \par Haydee Bills, MS, PA-C\par Blair Cooney MD\par

## 2023-03-16 NOTE — HISTORY OF PRESENT ILLNESS
[FreeTextEntry1] : ORIGINAL PRESENTATION:  Mr. Wooten is a 47-year-old male who presents today in neurologic consultation for symptoms of numbness, tingling, and weakness in the hands and fingers bilaterally for over a year. He is not diabetic, but he did have endocarditis related to an infection in his teeth for which he had to have mitral valve and aortic valve replacement. He has a defibrillator placed from 1/2019. \par \par TODAY:  I had the pleasure of seeing Mr. Wooten today in follow up.  His previous history and physical findings have been reviewed.\par \par He is under our care for possible B/L CTS which is a chronic condition he is receiving continuing active treatment for.  He underwent EMG of the upper extremities for further evaluation of his complaints and presents to review the results.  EMG of the upper extremities did reveal a b/l CTS R>L.  He states the R hand is what is causing him most problems as he is loosing strength and dropping things.  The numbness will sometimes wake him up at night and is becoming more problematic throughout the day.  We will therefore discuss treatment options at this time.

## 2023-03-17 DIAGNOSIS — G47.33 OBSTRUCTIVE SLEEP APNEA (ADULT) (PEDIATRIC): ICD-10-CM

## 2023-04-04 ENCOUNTER — OUTPATIENT (OUTPATIENT)
Dept: OUTPATIENT SERVICES | Facility: HOSPITAL | Age: 48
LOS: 1 days | End: 2023-04-04
Payer: COMMERCIAL

## 2023-04-04 ENCOUNTER — APPOINTMENT (OUTPATIENT)
Dept: MEDICATION MANAGEMENT | Facility: CLINIC | Age: 48
End: 2023-04-04

## 2023-04-04 DIAGNOSIS — Z98.890 OTHER SPECIFIED POSTPROCEDURAL STATES: Chronic | ICD-10-CM

## 2023-04-04 DIAGNOSIS — Z95.2 PRESENCE OF PROSTHETIC HEART VALVE: Chronic | ICD-10-CM

## 2023-04-04 DIAGNOSIS — Z95.2 PRESENCE OF PROSTHETIC HEART VALVE: ICD-10-CM

## 2023-04-04 DIAGNOSIS — Z79.01 LONG TERM (CURRENT) USE OF ANTICOAGULANTS: ICD-10-CM

## 2023-04-04 LAB
INR PPP: 2.6 RATIO
POCT-PROTHROMBIN TIME: 31.6 SECS
QUALITY CONTROL: YES

## 2023-04-04 PROCEDURE — 36416 COLLJ CAPILLARY BLOOD SPEC: CPT

## 2023-04-04 PROCEDURE — 99211 OFF/OP EST MAY X REQ PHY/QHP: CPT

## 2023-04-04 PROCEDURE — 85610 PROTHROMBIN TIME: CPT

## 2023-04-05 DIAGNOSIS — Z79.01 LONG TERM (CURRENT) USE OF ANTICOAGULANTS: ICD-10-CM

## 2023-04-05 DIAGNOSIS — Z95.2 PRESENCE OF PROSTHETIC HEART VALVE: ICD-10-CM

## 2023-04-10 ENCOUNTER — LABORATORY RESULT (OUTPATIENT)
Age: 48
End: 2023-04-10

## 2023-04-10 ENCOUNTER — OUTPATIENT (OUTPATIENT)
Dept: OUTPATIENT SERVICES | Facility: HOSPITAL | Age: 48
LOS: 1 days | End: 2023-04-10
Payer: COMMERCIAL

## 2023-04-10 ENCOUNTER — APPOINTMENT (OUTPATIENT)
Dept: MEDICATION MANAGEMENT | Facility: CLINIC | Age: 48
End: 2023-04-10

## 2023-04-10 DIAGNOSIS — Z98.890 OTHER SPECIFIED POSTPROCEDURAL STATES: Chronic | ICD-10-CM

## 2023-04-10 DIAGNOSIS — Z95.2 PRESENCE OF PROSTHETIC HEART VALVE: Chronic | ICD-10-CM

## 2023-04-10 DIAGNOSIS — Z79.01 LONG TERM (CURRENT) USE OF ANTICOAGULANTS: ICD-10-CM

## 2023-04-10 DIAGNOSIS — Z95.2 PRESENCE OF PROSTHETIC HEART VALVE: ICD-10-CM

## 2023-04-10 LAB
INR PPP: 1.6 RATIO
POCT-PROTHROMBIN TIME: 18.8 SECS
QUALITY CONTROL: YES

## 2023-04-10 PROCEDURE — 36416 COLLJ CAPILLARY BLOOD SPEC: CPT

## 2023-04-10 PROCEDURE — 85610 PROTHROMBIN TIME: CPT

## 2023-04-10 PROCEDURE — 99211 OFF/OP EST MAY X REQ PHY/QHP: CPT

## 2023-04-11 ENCOUNTER — TRANSCRIPTION ENCOUNTER (OUTPATIENT)
Age: 48
End: 2023-04-11

## 2023-04-11 ENCOUNTER — OUTPATIENT (OUTPATIENT)
Dept: INPATIENT UNIT | Facility: HOSPITAL | Age: 48
LOS: 1 days | Discharge: HOME | End: 2023-04-11
Payer: COMMERCIAL

## 2023-04-11 VITALS
RESPIRATION RATE: 18 BRPM | HEART RATE: 73 BPM | OXYGEN SATURATION: 100 % | SYSTOLIC BLOOD PRESSURE: 130 MMHG | DIASTOLIC BLOOD PRESSURE: 72 MMHG

## 2023-04-11 VITALS
RESPIRATION RATE: 18 BRPM | HEIGHT: 66 IN | DIASTOLIC BLOOD PRESSURE: 76 MMHG | WEIGHT: 190.04 LBS | HEART RATE: 76 BPM | SYSTOLIC BLOOD PRESSURE: 136 MMHG | TEMPERATURE: 97 F

## 2023-04-11 DIAGNOSIS — Z95.2 PRESENCE OF PROSTHETIC HEART VALVE: Chronic | ICD-10-CM

## 2023-04-11 DIAGNOSIS — Z95.2 PRESENCE OF PROSTHETIC HEART VALVE: ICD-10-CM

## 2023-04-11 DIAGNOSIS — K21.9 GASTRO-ESOPHAGEAL REFLUX DISEASE WITHOUT ESOPHAGITIS: ICD-10-CM

## 2023-04-11 DIAGNOSIS — Z98.890 OTHER SPECIFIED POSTPROCEDURAL STATES: Chronic | ICD-10-CM

## 2023-04-11 DIAGNOSIS — Z79.01 LONG TERM (CURRENT) USE OF ANTICOAGULANTS: ICD-10-CM

## 2023-04-11 PROCEDURE — 88312 SPECIAL STAINS GROUP 1: CPT | Mod: 26

## 2023-04-11 PROCEDURE — 88305 TISSUE EXAM BY PATHOLOGIST: CPT

## 2023-04-11 PROCEDURE — 88305 TISSUE EXAM BY PATHOLOGIST: CPT | Mod: 26

## 2023-04-11 PROCEDURE — 88312 SPECIAL STAINS GROUP 1: CPT

## 2023-04-11 RX ORDER — WARFARIN SODIUM 2.5 MG/1
5 TABLET ORAL
Qty: 0 | Refills: 0 | DISCHARGE

## 2023-04-11 RX ORDER — FAMOTIDINE 10 MG/ML
0 INJECTION INTRAVENOUS
Refills: 0 | DISCHARGE

## 2023-04-11 RX ORDER — WARFARIN SODIUM 2.5 MG/1
2.5 TABLET ORAL
Qty: 0 | Refills: 0 | DISCHARGE

## 2023-04-11 NOTE — H&P PST ADULT - NSICDXFAMILYHX_GEN_ALL_CORE_FT
FAMILY HISTORY:  Grandparent  Still living? Unknown  Family history of colon cancer, Age at diagnosis: Age Unknown

## 2023-04-11 NOTE — ASU DISCHARGE PLAN (ADULT/PEDIATRIC) - CARE PROVIDER_API CALL
Willy Clayton  GASTROENTEROLOGY  [address] [place] [address]  Phone: [phone]  Fax: [phone]  Follow Up Time:

## 2023-04-11 NOTE — H&P PST ADULT - NSICDXPASTSURGICALHX_GEN_ALL_CORE_FT
PAST SURGICAL HISTORY:  Aortic valve replaced     H/O hand surgery     H/O hernia repair     H/O mitral valve replacement

## 2023-04-11 NOTE — H&P PST ADULT - NSICDXPASTMEDICALHX_GEN_ALL_CORE_FT
PAST MEDICAL HISTORY:  Aortic valve replaced metallic valve    Infective endocarditis, due to unspecified organism, unspecified chronicity     Mitral valve replaced metallic valve

## 2023-04-11 NOTE — PRE-ANESTHESIA EVALUATION ADULT - NSANTHOSAYNRD_GEN_A_CORE
No. KARL screening performed.  STOP BANG Legend: 0-2 = LOW Risk; 3-4 = INTERMEDIATE Risk; 5-8 = HIGH Risk

## 2023-04-11 NOTE — H&P PST ADULT - ADMIT DATE
11-Apr-2023 Consent: The patient's consent was obtained including but not limited to risks of crusting, scabbing, blistering, scarring, darker or lighter pigmentary change, recurrence, incomplete removal and infection. Duration Of Freeze Thaw-Cycle (Seconds): 5 Render Note In Bullet Format When Appropriate: No Post-Care Instructions: I reviewed with the patient in detail post-care instructions. Patient is to wear sunprotection, and avoid picking at any of the treated lesions. Pt may apply Vaseline to crusted or scabbing areas. Detail Level: Detailed

## 2023-04-11 NOTE — ASU DISCHARGE PLAN (ADULT/PEDIATRIC) - NS MD DC FALL RISK RISK
For information on Fall & Injury Prevention, visit: [email or web address] OR  [email or web address] OR  https://www.cdc.gov/steadi/patient.html

## 2023-04-12 ENCOUNTER — APPOINTMENT (OUTPATIENT)
Dept: ELECTROPHYSIOLOGY | Facility: CLINIC | Age: 48
End: 2023-04-12
Payer: COMMERCIAL

## 2023-04-12 VITALS
BODY MASS INDEX: 30.67 KG/M2 | DIASTOLIC BLOOD PRESSURE: 80 MMHG | HEART RATE: 89 BPM | SYSTOLIC BLOOD PRESSURE: 122 MMHG | WEIGHT: 190 LBS

## 2023-04-12 LAB
SURGICAL PATHOLOGY STUDY: SIGNIFICANT CHANGE UP
SURGICAL PATHOLOGY STUDY: SIGNIFICANT CHANGE UP

## 2023-04-12 PROCEDURE — 99214 OFFICE O/P EST MOD 30 MIN: CPT | Mod: 25

## 2023-04-12 PROCEDURE — 93260 PRGRMG DEV EVAL IMPLTBL SYS: CPT

## 2023-04-12 PROCEDURE — 93000 ELECTROCARDIOGRAM COMPLETE: CPT | Mod: 59

## 2023-04-12 NOTE — PROCEDURE
[No] : not [NSR] : normal sinus rhythm [ICD] : Implantable cardioverter-defibrillator [Longevity: ___ months] : The estimated remaining battery life is [unfilled] months [Normal] : The battery status is normal. [Lead Imp:  ___ohms] : lead impedance was [unfilled] ohms [None] : none [See Device Printout] : See device printout [de-identified] : [facility] [de-identified] : A219 [de-identified] : [id number] [de-identified] : 01/03/2019 [de-identified] : Battery OK, 53% [de-identified] : System Impedance 85 ohms\par No Recordings or Episodes. \par Pt is monitored remotely.

## 2023-04-12 NOTE — HISTORY OF PRESENT ILLNESS
[de-identified] : \par Cardiologist: Dr. 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 Patient states that was the first time he was ever informed of cardiomyopathy. He was experiencing heart failure symptoms including dyspnea at rest and PND. He was prescribed Entresto but couldn't afford it due to insurance issues. Since discharge, he took his meds as prescribed. He wore LifeVest and is now s/p subcutaneous ICD 1/3/2019. He has a remote monitor set up. He has not been shocked by his device. Lost 10 lb last year with keto diet. \par \par He is here for routine device check. He denies chest pain, dyspnea, dizziness, lightheadedness, presyncope or syncope. No PND or lower extremity edema. Stable 2 pillow orthopnea.

## 2023-04-12 NOTE — CARDIOLOGY SUMMARY
[de-identified] : 4/12/2023 NSR (HR 89 bpm)\par 6/15/2022 NSR (HR 84 bpm)\par 3/16/2022 NSR (HR 76 bpm) [de-identified] : 7/13/2022 Stress ECHO\par SUSIE; 10:01 min, 87% MPHR, 10.5 METS\par EF 55% at rest and with exercise > 65%\par ECG findings are c/w ischemia in leads II, III, aVF. Normal echo response to exercise. [de-identified] : 8/19/2020 EF 50-55% LAE. Mechanical aortic and mitral valve. \par 10/29/18 Dilated LV; mild LVH; Mechanical Mitral and mechanical aortic valve with mild AI. LVEF 25%. \par  [de-identified] : 6/30/17, Patent coronary arteries

## 2023-04-12 NOTE — HISTORY OF PRESENT ILLNESS
[de-identified] : \par Cardiologist: Dr. 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 Patient states that was the first time he was ever informed of cardiomyopathy. He was experiencing heart failure symptoms including dyspnea at rest and PND. He was prescribed Entresto but couldn't afford it due to insurance issues. Since discharge, he took his meds as prescribed. He wore LifeVest and is now s/p subcutaneous ICD 1/3/2019. He has a remote monitor set up. He has not been shocked by his device. Lost 10 lb last year with keto diet. \par \par He is here for routine device check. He denies chest pain, dyspnea, dizziness, lightheadedness, presyncope or syncope. No PND or lower extremity edema. Stable 2 pillow orthopnea.

## 2023-04-12 NOTE — CARDIOLOGY SUMMARY
[de-identified] : 4/12/2023 NSR (HR 89 bpm)\par 6/15/2022 NSR (HR 84 bpm)\par 3/16/2022 NSR (HR 76 bpm) [de-identified] : 7/13/2022 Stress ECHO\par SUSIE; 10:01 min, 87% MPHR, 10.5 METS\par EF 55% at rest and with exercise > 65%\par ECG findings are c/w ischemia in leads II, III, aVF. Normal echo response to exercise. [de-identified] : 8/19/2020 EF 50-55% LAE. Mechanical aortic and mitral valve. \par 10/29/18 Dilated LV; mild LVH; Mechanical Mitral and mechanical aortic valve with mild AI. LVEF 25%. \par  [de-identified] : 6/30/17, Patent coronary arteries

## 2023-04-12 NOTE — PROCEDURE
[No] : not [NSR] : normal sinus rhythm [ICD] : Implantable cardioverter-defibrillator [Longevity: ___ months] : The estimated remaining battery life is [unfilled] months [Normal] : The battery status is normal. [Lead Imp:  ___ohms] : lead impedance was [unfilled] ohms [None] : none [See Device Printout] : See device printout [de-identified] : [facility] [de-identified] : A219 [de-identified] : [id number] [de-identified] : 01/03/2019 [de-identified] : Battery OK, 53% [de-identified] : System Impedance 85 ohms\par No Recordings or Episodes. \par Pt is monitored remotely.

## 2023-04-12 NOTE — ASSESSMENT
[FreeTextEntry1] : Mr. Wooten presents for routine follow up of subcutaneous ICD, which was implanted for primary prevention for dilated cardiomyopathy. \par \par # DCM s/p subcutaneous ICD\par - Interrogation as above\par - Remote monitor is set up and patient is transmitting.\par - Cont GDMT\par \par # Mechanical Aortic Valve\par - Cont coumadin. Denies s/sx of bleeding. Follows up with coumadin clinic\par \par I have also advised the patient to go to the nearest emergency room if he experiences any chest pain, dyspnea, syncope, or has any other compelling symptoms.\par \par Follow up in 6-9 mo for in office interrogation with NP

## 2023-04-12 NOTE — PHYSICAL EXAM
[General Appearance - Well Developed] : well developed [Normal Appearance] : normal appearance [Well Groomed] : well groomed [General Appearance - Well Nourished] : well nourished [No Deformities] : no deformities [General Appearance - In No Acute Distress] : no acute distress [Heart Rate And Rhythm] : heart rate and rhythm were normal [Heart Sounds] : normal S1 and S2 [Edema] : no peripheral edema present [] : no respiratory distress [Respiration, Rhythm And Depth] : normal respiratory rhythm and effort [Exaggerated Use Of Accessory Muscles For Inspiration] : no accessory muscle use [Auscultation Breath Sounds / Voice Sounds] : lungs were clear to auscultation bilaterally [Well-Healed] : well-healed [Erythema] : not erythematous [Warm] : not warm [Tender] : not tender [FreeTextEntry1] : Left mid-axillary and subxiphoid incisions are well healed [Abdomen Soft] : soft [Nail Clubbing] : no clubbing of the fingernails [Cyanosis, Localized] : no localized cyanosis

## 2023-04-14 ENCOUNTER — OUTPATIENT (OUTPATIENT)
Dept: OUTPATIENT SERVICES | Facility: HOSPITAL | Age: 48
LOS: 1 days | End: 2023-04-14
Payer: COMMERCIAL

## 2023-04-14 ENCOUNTER — APPOINTMENT (OUTPATIENT)
Dept: MEDICATION MANAGEMENT | Facility: CLINIC | Age: 48
End: 2023-04-14

## 2023-04-14 DIAGNOSIS — K64.4 RESIDUAL HEMORRHOIDAL SKIN TAGS: ICD-10-CM

## 2023-04-14 DIAGNOSIS — Z95.2 PRESENCE OF PROSTHETIC HEART VALVE: ICD-10-CM

## 2023-04-14 DIAGNOSIS — K29.80 DUODENITIS WITHOUT BLEEDING: ICD-10-CM

## 2023-04-14 DIAGNOSIS — K21.00 GASTRO-ESOPHAGEAL REFLUX DISEASE WITH ESOPHAGITIS, WITHOUT BLEEDING: ICD-10-CM

## 2023-04-14 DIAGNOSIS — Z98.890 OTHER SPECIFIED POSTPROCEDURAL STATES: Chronic | ICD-10-CM

## 2023-04-14 DIAGNOSIS — Z79.01 LONG TERM (CURRENT) USE OF ANTICOAGULANTS: ICD-10-CM

## 2023-04-14 DIAGNOSIS — Z95.810 PRESENCE OF AUTOMATIC (IMPLANTABLE) CARDIAC DEFIBRILLATOR: ICD-10-CM

## 2023-04-14 DIAGNOSIS — Z88.0 ALLERGY STATUS TO PENICILLIN: ICD-10-CM

## 2023-04-14 DIAGNOSIS — K63.3 ULCER OF INTESTINE: ICD-10-CM

## 2023-04-14 DIAGNOSIS — Z12.11 ENCOUNTER FOR SCREENING FOR MALIGNANT NEOPLASM OF COLON: ICD-10-CM

## 2023-04-14 DIAGNOSIS — K64.8 OTHER HEMORRHOIDS: ICD-10-CM

## 2023-04-14 DIAGNOSIS — Z95.2 PRESENCE OF PROSTHETIC HEART VALVE: Chronic | ICD-10-CM

## 2023-04-14 DIAGNOSIS — K52.9 NONINFECTIVE GASTROENTERITIS AND COLITIS, UNSPECIFIED: ICD-10-CM

## 2023-04-14 DIAGNOSIS — Z80.0 FAMILY HISTORY OF MALIGNANT NEOPLASM OF DIGESTIVE ORGANS: ICD-10-CM

## 2023-04-14 LAB
INR PPP: 1.5 RATIO
POCT-PROTHROMBIN TIME: 17.7 SECS
QUALITY CONTROL: YES

## 2023-04-14 PROCEDURE — 36416 COLLJ CAPILLARY BLOOD SPEC: CPT

## 2023-04-14 PROCEDURE — 99211 OFF/OP EST MAY X REQ PHY/QHP: CPT

## 2023-04-14 PROCEDURE — 85610 PROTHROMBIN TIME: CPT

## 2023-04-15 DIAGNOSIS — Z79.01 LONG TERM (CURRENT) USE OF ANTICOAGULANTS: ICD-10-CM

## 2023-04-15 DIAGNOSIS — Z95.2 PRESENCE OF PROSTHETIC HEART VALVE: ICD-10-CM

## 2023-04-18 ENCOUNTER — OUTPATIENT (OUTPATIENT)
Dept: OUTPATIENT SERVICES | Facility: HOSPITAL | Age: 48
LOS: 1 days | End: 2023-04-18
Payer: COMMERCIAL

## 2023-04-18 ENCOUNTER — APPOINTMENT (OUTPATIENT)
Dept: MEDICATION MANAGEMENT | Facility: CLINIC | Age: 48
End: 2023-04-18

## 2023-04-18 ENCOUNTER — APPOINTMENT (OUTPATIENT)
Dept: PULMONOLOGY | Facility: CLINIC | Age: 48
End: 2023-04-18

## 2023-04-18 DIAGNOSIS — Z95.2 PRESENCE OF PROSTHETIC HEART VALVE: Chronic | ICD-10-CM

## 2023-04-18 DIAGNOSIS — Z98.890 OTHER SPECIFIED POSTPROCEDURAL STATES: Chronic | ICD-10-CM

## 2023-04-18 DIAGNOSIS — Z95.2 PRESENCE OF PROSTHETIC HEART VALVE: ICD-10-CM

## 2023-04-18 DIAGNOSIS — Z79.01 LONG TERM (CURRENT) USE OF ANTICOAGULANTS: ICD-10-CM

## 2023-04-18 LAB
INR PPP: 2.3 RATIO
POCT-PROTHROMBIN TIME: 27.4 SECS
QUALITY CONTROL: YES

## 2023-04-18 PROCEDURE — 85610 PROTHROMBIN TIME: CPT

## 2023-04-18 PROCEDURE — 36416 COLLJ CAPILLARY BLOOD SPEC: CPT

## 2023-04-18 PROCEDURE — 99211 OFF/OP EST MAY X REQ PHY/QHP: CPT

## 2023-04-19 DIAGNOSIS — Z95.2 PRESENCE OF PROSTHETIC HEART VALVE: ICD-10-CM

## 2023-04-19 DIAGNOSIS — Z79.01 LONG TERM (CURRENT) USE OF ANTICOAGULANTS: ICD-10-CM

## 2023-05-01 ENCOUNTER — APPOINTMENT (OUTPATIENT)
Dept: MEDICATION MANAGEMENT | Facility: CLINIC | Age: 48
End: 2023-05-01

## 2023-05-01 ENCOUNTER — OUTPATIENT (OUTPATIENT)
Dept: OUTPATIENT SERVICES | Facility: HOSPITAL | Age: 48
LOS: 1 days | End: 2023-05-01
Payer: COMMERCIAL

## 2023-05-01 DIAGNOSIS — Z79.01 LONG TERM (CURRENT) USE OF ANTICOAGULANTS: ICD-10-CM

## 2023-05-01 DIAGNOSIS — Z95.2 PRESENCE OF PROSTHETIC HEART VALVE: Chronic | ICD-10-CM

## 2023-05-01 DIAGNOSIS — Z95.2 PRESENCE OF PROSTHETIC HEART VALVE: ICD-10-CM

## 2023-05-01 DIAGNOSIS — Z98.890 OTHER SPECIFIED POSTPROCEDURAL STATES: Chronic | ICD-10-CM

## 2023-05-01 LAB
INR PPP: 3 RATIO
POCT-PROTHROMBIN TIME: 35.9 SECS
QUALITY CONTROL: YES

## 2023-05-01 PROCEDURE — 36416 COLLJ CAPILLARY BLOOD SPEC: CPT

## 2023-05-01 PROCEDURE — 85610 PROTHROMBIN TIME: CPT

## 2023-05-01 PROCEDURE — 99211 OFF/OP EST MAY X REQ PHY/QHP: CPT

## 2023-05-02 DIAGNOSIS — Z79.01 LONG TERM (CURRENT) USE OF ANTICOAGULANTS: ICD-10-CM

## 2023-05-02 DIAGNOSIS — Z95.2 PRESENCE OF PROSTHETIC HEART VALVE: ICD-10-CM

## 2023-05-15 ENCOUNTER — APPOINTMENT (OUTPATIENT)
Dept: NEUROLOGY | Facility: CLINIC | Age: 48
End: 2023-05-15

## 2023-05-30 ENCOUNTER — OUTPATIENT (OUTPATIENT)
Dept: OUTPATIENT SERVICES | Facility: HOSPITAL | Age: 48
LOS: 1 days | End: 2023-05-30
Payer: COMMERCIAL

## 2023-05-30 ENCOUNTER — APPOINTMENT (OUTPATIENT)
Dept: MEDICATION MANAGEMENT | Facility: CLINIC | Age: 48
End: 2023-05-30

## 2023-05-30 DIAGNOSIS — Z79.01 LONG TERM (CURRENT) USE OF ANTICOAGULANTS: ICD-10-CM

## 2023-05-30 DIAGNOSIS — Z98.890 OTHER SPECIFIED POSTPROCEDURAL STATES: Chronic | ICD-10-CM

## 2023-05-30 DIAGNOSIS — Z95.2 PRESENCE OF PROSTHETIC HEART VALVE: Chronic | ICD-10-CM

## 2023-05-30 DIAGNOSIS — Z95.2 PRESENCE OF PROSTHETIC HEART VALVE: ICD-10-CM

## 2023-05-30 LAB
INR PPP: 2.4 RATIO
POCT-PROTHROMBIN TIME: 28.8 SECS
QUALITY CONTROL: YES

## 2023-05-30 PROCEDURE — 85610 PROTHROMBIN TIME: CPT

## 2023-05-30 PROCEDURE — 36416 COLLJ CAPILLARY BLOOD SPEC: CPT

## 2023-05-30 PROCEDURE — 99211 OFF/OP EST MAY X REQ PHY/QHP: CPT

## 2023-05-31 DIAGNOSIS — Z79.01 LONG TERM (CURRENT) USE OF ANTICOAGULANTS: ICD-10-CM

## 2023-05-31 DIAGNOSIS — Z95.2 PRESENCE OF PROSTHETIC HEART VALVE: ICD-10-CM

## 2023-06-01 NOTE — ASU DISCHARGE PLAN (ADULT/PEDIATRIC) - ***IN THE EVENT THAT YOU DEVELOP A COMPLICATION AND YOU ARE UNABLE TO REACH YOUR OWN PHYSICIAN, YOU MAY CONTACT:
Statement Selected
38 y/o male w/ hx of lyme disease complaining of left abdominal pain. pt was seen by primary provider after fever for a week. Patient stated ultrasound was done yesterday and was dx with enlarged spleen. was instructed by provider to visit the ED if any pain.

## 2023-06-20 ENCOUNTER — OUTPATIENT (OUTPATIENT)
Dept: OUTPATIENT SERVICES | Facility: HOSPITAL | Age: 48
LOS: 1 days | End: 2023-06-20
Payer: COMMERCIAL

## 2023-06-20 ENCOUNTER — APPOINTMENT (OUTPATIENT)
Dept: MEDICATION MANAGEMENT | Facility: CLINIC | Age: 48
End: 2023-06-20

## 2023-06-20 DIAGNOSIS — Z95.2 PRESENCE OF PROSTHETIC HEART VALVE: Chronic | ICD-10-CM

## 2023-06-20 DIAGNOSIS — Z98.890 OTHER SPECIFIED POSTPROCEDURAL STATES: Chronic | ICD-10-CM

## 2023-06-20 DIAGNOSIS — Z79.01 LONG TERM (CURRENT) USE OF ANTICOAGULANTS: ICD-10-CM

## 2023-06-20 DIAGNOSIS — Z95.2 PRESENCE OF PROSTHETIC HEART VALVE: ICD-10-CM

## 2023-06-20 LAB
INR PPP: 3.8 RATIO
POCT-PROTHROMBIN TIME: 45.9 SECS
QUALITY CONTROL: YES

## 2023-06-20 PROCEDURE — 99211 OFF/OP EST MAY X REQ PHY/QHP: CPT

## 2023-06-20 PROCEDURE — 36416 COLLJ CAPILLARY BLOOD SPEC: CPT

## 2023-06-20 PROCEDURE — 85610 PROTHROMBIN TIME: CPT

## 2023-06-21 DIAGNOSIS — Z95.2 PRESENCE OF PROSTHETIC HEART VALVE: ICD-10-CM

## 2023-06-21 DIAGNOSIS — Z79.01 LONG TERM (CURRENT) USE OF ANTICOAGULANTS: ICD-10-CM

## 2023-07-11 ENCOUNTER — OUTPATIENT (OUTPATIENT)
Dept: OUTPATIENT SERVICES | Facility: HOSPITAL | Age: 48
LOS: 1 days | End: 2023-07-11
Payer: COMMERCIAL

## 2023-07-11 ENCOUNTER — APPOINTMENT (OUTPATIENT)
Dept: MEDICATION MANAGEMENT | Facility: CLINIC | Age: 48
End: 2023-07-11

## 2023-07-11 DIAGNOSIS — Z98.890 OTHER SPECIFIED POSTPROCEDURAL STATES: Chronic | ICD-10-CM

## 2023-07-11 DIAGNOSIS — Z95.2 PRESENCE OF PROSTHETIC HEART VALVE: ICD-10-CM

## 2023-07-11 DIAGNOSIS — Z79.01 LONG TERM (CURRENT) USE OF ANTICOAGULANTS: ICD-10-CM

## 2023-07-11 DIAGNOSIS — Z95.2 PRESENCE OF PROSTHETIC HEART VALVE: Chronic | ICD-10-CM

## 2023-07-11 LAB
INR PPP: 3.8 RATIO
POCT-PROTHROMBIN TIME: 46.1 SECS
QUALITY CONTROL: YES

## 2023-07-11 PROCEDURE — 36416 COLLJ CAPILLARY BLOOD SPEC: CPT

## 2023-07-11 PROCEDURE — 99211 OFF/OP EST MAY X REQ PHY/QHP: CPT

## 2023-07-11 PROCEDURE — 85610 PROTHROMBIN TIME: CPT

## 2023-07-12 ENCOUNTER — APPOINTMENT (OUTPATIENT)
Dept: CARDIOLOGY | Facility: CLINIC | Age: 48
End: 2023-07-12

## 2023-07-12 ENCOUNTER — NON-APPOINTMENT (OUTPATIENT)
Age: 48
End: 2023-07-12

## 2023-07-12 DIAGNOSIS — Z95.2 PRESENCE OF PROSTHETIC HEART VALVE: ICD-10-CM

## 2023-07-12 DIAGNOSIS — Z79.01 LONG TERM (CURRENT) USE OF ANTICOAGULANTS: ICD-10-CM

## 2023-08-03 ENCOUNTER — APPOINTMENT (OUTPATIENT)
Dept: MEDICATION MANAGEMENT | Facility: CLINIC | Age: 48
End: 2023-08-03

## 2023-08-03 ENCOUNTER — OUTPATIENT (OUTPATIENT)
Dept: OUTPATIENT SERVICES | Facility: HOSPITAL | Age: 48
LOS: 1 days | End: 2023-08-03
Payer: SELF-PAY

## 2023-08-03 DIAGNOSIS — Z95.2 PRESENCE OF PROSTHETIC HEART VALVE: Chronic | ICD-10-CM

## 2023-08-03 DIAGNOSIS — Z98.890 OTHER SPECIFIED POSTPROCEDURAL STATES: Chronic | ICD-10-CM

## 2023-08-03 DIAGNOSIS — Z79.01 LONG TERM (CURRENT) USE OF ANTICOAGULANTS: ICD-10-CM

## 2023-08-03 DIAGNOSIS — Z95.2 PRESENCE OF PROSTHETIC HEART VALVE: ICD-10-CM

## 2023-08-03 LAB
INR PPP: 2.9 RATIO
POCT-PROTHROMBIN TIME: 34.3 SECS
QUALITY CONTROL: YES

## 2023-08-03 PROCEDURE — 85610 PROTHROMBIN TIME: CPT

## 2023-08-03 PROCEDURE — 99211 OFF/OP EST MAY X REQ PHY/QHP: CPT

## 2023-08-03 PROCEDURE — 36416 COLLJ CAPILLARY BLOOD SPEC: CPT

## 2023-08-04 DIAGNOSIS — Z79.01 LONG TERM (CURRENT) USE OF ANTICOAGULANTS: ICD-10-CM

## 2023-08-04 DIAGNOSIS — Z95.2 PRESENCE OF PROSTHETIC HEART VALVE: ICD-10-CM

## 2023-08-22 ENCOUNTER — NON-APPOINTMENT (OUTPATIENT)
Age: 48
End: 2023-08-22

## 2023-09-01 ENCOUNTER — APPOINTMENT (OUTPATIENT)
Dept: MEDICATION MANAGEMENT | Facility: CLINIC | Age: 48
End: 2023-09-01

## 2023-09-01 ENCOUNTER — OUTPATIENT (OUTPATIENT)
Dept: OUTPATIENT SERVICES | Facility: HOSPITAL | Age: 48
LOS: 1 days | End: 2023-09-01
Payer: COMMERCIAL

## 2023-09-01 DIAGNOSIS — Z95.2 PRESENCE OF PROSTHETIC HEART VALVE: Chronic | ICD-10-CM

## 2023-09-01 DIAGNOSIS — Z79.01 LONG TERM (CURRENT) USE OF ANTICOAGULANTS: ICD-10-CM

## 2023-09-01 DIAGNOSIS — Z95.2 PRESENCE OF PROSTHETIC HEART VALVE: ICD-10-CM

## 2023-09-01 DIAGNOSIS — Z98.890 OTHER SPECIFIED POSTPROCEDURAL STATES: Chronic | ICD-10-CM

## 2023-09-01 LAB
INR PPP: 2.8 RATIO
POCT-PROTHROMBIN TIME: 33.1 SECS

## 2023-09-01 PROCEDURE — 85610 PROTHROMBIN TIME: CPT

## 2023-09-01 PROCEDURE — 99211 OFF/OP EST MAY X REQ PHY/QHP: CPT

## 2023-09-01 PROCEDURE — 36416 COLLJ CAPILLARY BLOOD SPEC: CPT

## 2023-09-02 DIAGNOSIS — Z79.01 LONG TERM (CURRENT) USE OF ANTICOAGULANTS: ICD-10-CM

## 2023-09-02 DIAGNOSIS — Z95.2 PRESENCE OF PROSTHETIC HEART VALVE: ICD-10-CM

## 2023-09-13 ENCOUNTER — APPOINTMENT (OUTPATIENT)
Dept: CARDIOLOGY | Facility: CLINIC | Age: 48
End: 2023-09-13
Payer: SELF-PAY

## 2023-09-13 ENCOUNTER — NON-APPOINTMENT (OUTPATIENT)
Age: 48
End: 2023-09-13

## 2023-09-13 PROCEDURE — 93296 REM INTERROG EVL PM/IDS: CPT

## 2023-09-13 PROCEDURE — 93295 DEV INTERROG REMOTE 1/2/MLT: CPT

## 2023-09-28 ENCOUNTER — OUTPATIENT (OUTPATIENT)
Dept: OUTPATIENT SERVICES | Facility: HOSPITAL | Age: 48
LOS: 1 days | End: 2023-09-28
Payer: COMMERCIAL

## 2023-09-28 ENCOUNTER — APPOINTMENT (OUTPATIENT)
Dept: MEDICATION MANAGEMENT | Facility: CLINIC | Age: 48
End: 2023-09-28

## 2023-09-28 DIAGNOSIS — Z95.2 PRESENCE OF PROSTHETIC HEART VALVE: ICD-10-CM

## 2023-09-28 DIAGNOSIS — Z98.890 OTHER SPECIFIED POSTPROCEDURAL STATES: Chronic | ICD-10-CM

## 2023-09-28 DIAGNOSIS — Z95.2 PRESENCE OF PROSTHETIC HEART VALVE: Chronic | ICD-10-CM

## 2023-09-28 DIAGNOSIS — Z79.01 LONG TERM (CURRENT) USE OF ANTICOAGULANTS: ICD-10-CM

## 2023-09-28 LAB
INR PPP: 2.5 RATIO
POCT-PROTHROMBIN TIME: 31 SECS

## 2023-09-28 PROCEDURE — 36416 COLLJ CAPILLARY BLOOD SPEC: CPT

## 2023-09-28 PROCEDURE — 85610 PROTHROMBIN TIME: CPT

## 2023-09-28 PROCEDURE — 99211 OFF/OP EST MAY X REQ PHY/QHP: CPT

## 2023-09-29 DIAGNOSIS — Z95.2 PRESENCE OF PROSTHETIC HEART VALVE: ICD-10-CM

## 2023-09-29 DIAGNOSIS — Z79.01 LONG TERM (CURRENT) USE OF ANTICOAGULANTS: ICD-10-CM

## 2023-10-26 ENCOUNTER — OUTPATIENT (OUTPATIENT)
Dept: OUTPATIENT SERVICES | Facility: HOSPITAL | Age: 48
LOS: 1 days | End: 2023-10-26
Payer: COMMERCIAL

## 2023-10-26 ENCOUNTER — APPOINTMENT (OUTPATIENT)
Dept: MEDICATION MANAGEMENT | Facility: CLINIC | Age: 48
End: 2023-10-26

## 2023-10-26 DIAGNOSIS — Z98.890 OTHER SPECIFIED POSTPROCEDURAL STATES: Chronic | ICD-10-CM

## 2023-10-26 DIAGNOSIS — Z95.2 PRESENCE OF PROSTHETIC HEART VALVE: Chronic | ICD-10-CM

## 2023-10-26 DIAGNOSIS — Z79.01 LONG TERM (CURRENT) USE OF ANTICOAGULANTS: ICD-10-CM

## 2023-10-26 DIAGNOSIS — Z95.2 PRESENCE OF PROSTHETIC HEART VALVE: ICD-10-CM

## 2023-10-26 LAB
INR PPP: 2.4 RATIO
POCT-PROTHROMBIN TIME: 29.3 SECS
QUALITY CONTROL: YES

## 2023-10-26 PROCEDURE — 99211 OFF/OP EST MAY X REQ PHY/QHP: CPT

## 2023-10-26 PROCEDURE — 36416 COLLJ CAPILLARY BLOOD SPEC: CPT

## 2023-10-26 PROCEDURE — 85610 PROTHROMBIN TIME: CPT

## 2023-10-27 DIAGNOSIS — Z79.01 LONG TERM (CURRENT) USE OF ANTICOAGULANTS: ICD-10-CM

## 2023-10-27 DIAGNOSIS — Z95.2 PRESENCE OF PROSTHETIC HEART VALVE: ICD-10-CM

## 2023-12-01 ENCOUNTER — OUTPATIENT (OUTPATIENT)
Dept: OUTPATIENT SERVICES | Facility: HOSPITAL | Age: 48
LOS: 1 days | End: 2023-12-01
Payer: COMMERCIAL

## 2023-12-01 ENCOUNTER — APPOINTMENT (OUTPATIENT)
Dept: MEDICATION MANAGEMENT | Facility: CLINIC | Age: 48
End: 2023-12-01

## 2023-12-01 DIAGNOSIS — Z98.890 OTHER SPECIFIED POSTPROCEDURAL STATES: Chronic | ICD-10-CM

## 2023-12-01 DIAGNOSIS — Z95.2 PRESENCE OF PROSTHETIC HEART VALVE: Chronic | ICD-10-CM

## 2023-12-01 DIAGNOSIS — Z79.01 LONG TERM (CURRENT) USE OF ANTICOAGULANTS: ICD-10-CM

## 2023-12-01 DIAGNOSIS — Z95.2 PRESENCE OF PROSTHETIC HEART VALVE: ICD-10-CM

## 2023-12-01 LAB
INR PPP: 3.3 RATIO
POCT-PROTHROMBIN TIME: 39.1 SECS

## 2023-12-01 PROCEDURE — 99211 OFF/OP EST MAY X REQ PHY/QHP: CPT

## 2023-12-01 PROCEDURE — 36416 COLLJ CAPILLARY BLOOD SPEC: CPT

## 2023-12-01 PROCEDURE — 85610 PROTHROMBIN TIME: CPT

## 2023-12-02 DIAGNOSIS — Z95.2 PRESENCE OF PROSTHETIC HEART VALVE: ICD-10-CM

## 2023-12-02 DIAGNOSIS — Z79.01 LONG TERM (CURRENT) USE OF ANTICOAGULANTS: ICD-10-CM

## 2023-12-08 ENCOUNTER — APPOINTMENT (OUTPATIENT)
Dept: MEDICATION MANAGEMENT | Facility: CLINIC | Age: 48
End: 2023-12-08

## 2023-12-08 ENCOUNTER — OUTPATIENT (OUTPATIENT)
Dept: OUTPATIENT SERVICES | Facility: HOSPITAL | Age: 48
LOS: 1 days | End: 2023-12-08
Payer: COMMERCIAL

## 2023-12-08 DIAGNOSIS — Z95.2 PRESENCE OF PROSTHETIC HEART VALVE: ICD-10-CM

## 2023-12-08 DIAGNOSIS — Z95.2 PRESENCE OF PROSTHETIC HEART VALVE: Chronic | ICD-10-CM

## 2023-12-08 DIAGNOSIS — Z98.890 OTHER SPECIFIED POSTPROCEDURAL STATES: Chronic | ICD-10-CM

## 2023-12-08 DIAGNOSIS — Z79.01 LONG TERM (CURRENT) USE OF ANTICOAGULANTS: ICD-10-CM

## 2023-12-08 LAB
INR PPP: 1.7 RATIO
POCT-PROTHROMBIN TIME: 20.1 SECS
QUALITY CONTROL: YES

## 2023-12-08 PROCEDURE — 36416 COLLJ CAPILLARY BLOOD SPEC: CPT

## 2023-12-08 PROCEDURE — 99211 OFF/OP EST MAY X REQ PHY/QHP: CPT

## 2023-12-08 PROCEDURE — 85610 PROTHROMBIN TIME: CPT

## 2023-12-09 DIAGNOSIS — Z95.2 PRESENCE OF PROSTHETIC HEART VALVE: ICD-10-CM

## 2023-12-09 DIAGNOSIS — Z79.01 LONG TERM (CURRENT) USE OF ANTICOAGULANTS: ICD-10-CM

## 2023-12-13 ENCOUNTER — APPOINTMENT (OUTPATIENT)
Dept: CARDIOLOGY | Facility: CLINIC | Age: 48
End: 2023-12-13
Payer: COMMERCIAL

## 2023-12-13 ENCOUNTER — NON-APPOINTMENT (OUTPATIENT)
Age: 48
End: 2023-12-13

## 2023-12-13 PROCEDURE — 93296 REM INTERROG EVL PM/IDS: CPT

## 2023-12-13 PROCEDURE — 93295 DEV INTERROG REMOTE 1/2/MLT: CPT

## 2023-12-15 ENCOUNTER — OUTPATIENT (OUTPATIENT)
Dept: OUTPATIENT SERVICES | Facility: HOSPITAL | Age: 48
LOS: 1 days | End: 2023-12-15
Payer: COMMERCIAL

## 2023-12-15 ENCOUNTER — APPOINTMENT (OUTPATIENT)
Dept: MEDICATION MANAGEMENT | Facility: CLINIC | Age: 48
End: 2023-12-15

## 2023-12-15 DIAGNOSIS — Z79.01 LONG TERM (CURRENT) USE OF ANTICOAGULANTS: ICD-10-CM

## 2023-12-15 DIAGNOSIS — Z95.2 PRESENCE OF PROSTHETIC HEART VALVE: ICD-10-CM

## 2023-12-15 DIAGNOSIS — Z95.2 PRESENCE OF PROSTHETIC HEART VALVE: Chronic | ICD-10-CM

## 2023-12-15 LAB
INR PPP: 2.5 RATIO
POCT-PROTHROMBIN TIME: 29.4 SECS

## 2023-12-15 PROCEDURE — 99211 OFF/OP EST MAY X REQ PHY/QHP: CPT

## 2023-12-15 PROCEDURE — 85610 PROTHROMBIN TIME: CPT

## 2023-12-15 PROCEDURE — 36416 COLLJ CAPILLARY BLOOD SPEC: CPT

## 2023-12-16 DIAGNOSIS — Z95.2 PRESENCE OF PROSTHETIC HEART VALVE: ICD-10-CM

## 2023-12-16 DIAGNOSIS — Z79.01 LONG TERM (CURRENT) USE OF ANTICOAGULANTS: ICD-10-CM

## 2023-12-22 ENCOUNTER — OUTPATIENT (OUTPATIENT)
Dept: OUTPATIENT SERVICES | Facility: HOSPITAL | Age: 48
LOS: 1 days | End: 2023-12-22
Payer: COMMERCIAL

## 2023-12-22 ENCOUNTER — APPOINTMENT (OUTPATIENT)
Dept: MEDICATION MANAGEMENT | Facility: CLINIC | Age: 48
End: 2023-12-22

## 2023-12-22 DIAGNOSIS — Z79.01 LONG TERM (CURRENT) USE OF ANTICOAGULANTS: ICD-10-CM

## 2023-12-22 DIAGNOSIS — Z98.890 OTHER SPECIFIED POSTPROCEDURAL STATES: Chronic | ICD-10-CM

## 2023-12-22 DIAGNOSIS — Z95.2 PRESENCE OF PROSTHETIC HEART VALVE: Chronic | ICD-10-CM

## 2023-12-22 LAB
INR PPP: 3.2 RATIO
POCT-PROTHROMBIN TIME: 38.3 SECS

## 2023-12-22 PROCEDURE — 36416 COLLJ CAPILLARY BLOOD SPEC: CPT

## 2023-12-22 PROCEDURE — 85610 PROTHROMBIN TIME: CPT

## 2023-12-22 PROCEDURE — 99211 OFF/OP EST MAY X REQ PHY/QHP: CPT

## 2023-12-23 DIAGNOSIS — Z79.01 LONG TERM (CURRENT) USE OF ANTICOAGULANTS: ICD-10-CM

## 2024-01-05 ENCOUNTER — APPOINTMENT (OUTPATIENT)
Dept: MEDICATION MANAGEMENT | Facility: CLINIC | Age: 49
End: 2024-01-05

## 2024-01-05 NOTE — ED ADULT TRIAGE NOTE - BSA (M2)
Detail Level: Detailed 1.96 Quality 110: Preventive Care And Screening: Influenza Immunization: Influenza Immunization not Administered for Documented Reasons.

## 2024-01-09 ENCOUNTER — APPOINTMENT (OUTPATIENT)
Dept: MEDICATION MANAGEMENT | Facility: CLINIC | Age: 49
End: 2024-01-09

## 2024-01-09 ENCOUNTER — OUTPATIENT (OUTPATIENT)
Dept: OUTPATIENT SERVICES | Facility: HOSPITAL | Age: 49
LOS: 1 days | End: 2024-01-09
Payer: COMMERCIAL

## 2024-01-09 ENCOUNTER — APPOINTMENT (OUTPATIENT)
Age: 49
End: 2024-01-09

## 2024-01-09 DIAGNOSIS — Z95.2 PRESENCE OF PROSTHETIC HEART VALVE: Chronic | ICD-10-CM

## 2024-01-09 DIAGNOSIS — Z79.01 LONG TERM (CURRENT) USE OF ANTICOAGULANTS: ICD-10-CM

## 2024-01-09 DIAGNOSIS — Z98.890 OTHER SPECIFIED POSTPROCEDURAL STATES: Chronic | ICD-10-CM

## 2024-01-09 DIAGNOSIS — Z95.2 PRESENCE OF PROSTHETIC HEART VALVE: ICD-10-CM

## 2024-01-09 LAB
INR PPP: 3 RATIO
POCT-PROTHROMBIN TIME: 35.9 SECS
QUALITY CONTROL: YES

## 2024-01-09 PROCEDURE — 99211 OFF/OP EST MAY X REQ PHY/QHP: CPT

## 2024-01-09 PROCEDURE — 36416 COLLJ CAPILLARY BLOOD SPEC: CPT

## 2024-01-09 PROCEDURE — 85610 PROTHROMBIN TIME: CPT

## 2024-01-09 NOTE — H&P PST ADULT - HISTORY OF PRESENT ILLNESS
none PATIENT CURRENTLY DENIES CHEST PAIN  SHORTNESS OF BREATH  PALPITATIONS,  DYSURIA, OR UPPER RESPIRATORY INFECTION IN PAST 2 WEEKS  EXERCISE  TOLERANCE  1-2 FLIGHT OF STAIRS  WITHOUT SHORTNESS OF BREATH

## 2024-01-10 ENCOUNTER — APPOINTMENT (OUTPATIENT)
Dept: ELECTROPHYSIOLOGY | Facility: CLINIC | Age: 49
End: 2024-01-10
Payer: COMMERCIAL

## 2024-01-10 VITALS
TEMPERATURE: 98 F | BODY MASS INDEX: 33.75 KG/M2 | WEIGHT: 210 LBS | DIASTOLIC BLOOD PRESSURE: 81 MMHG | HEART RATE: 82 BPM | HEIGHT: 66 IN | SYSTOLIC BLOOD PRESSURE: 119 MMHG

## 2024-01-10 DIAGNOSIS — Z45.02 ENCOUNTER FOR ADJUSTMENT AND MANAGEMENT OF AUTOMATIC IMPLANTABLE CARDIAC DEFIBRILLATOR: ICD-10-CM

## 2024-01-10 DIAGNOSIS — Z95.2 PRESENCE OF PROSTHETIC HEART VALVE: ICD-10-CM

## 2024-01-10 DIAGNOSIS — I42.0 DILATED CARDIOMYOPATHY: ICD-10-CM

## 2024-01-10 DIAGNOSIS — I50.22 CHRONIC SYSTOLIC (CONGESTIVE) HEART FAILURE: ICD-10-CM

## 2024-01-10 DIAGNOSIS — Z79.01 LONG TERM (CURRENT) USE OF ANTICOAGULANTS: ICD-10-CM

## 2024-01-10 PROCEDURE — 99213 OFFICE O/P EST LOW 20 MIN: CPT

## 2024-01-10 PROCEDURE — 93282 PRGRMG EVAL IMPLANTABLE DFB: CPT

## 2024-01-10 RX ORDER — FUROSEMIDE 40 MG/1
40 TABLET ORAL DAILY
Refills: 0 | Status: ACTIVE | COMMUNITY

## 2024-01-10 RX ORDER — ENOXAPARIN SODIUM 80 MG/.8ML
80 INJECTION, SOLUTION SUBCUTANEOUS
Qty: 10 | Refills: 3 | Status: COMPLETED | COMMUNITY
Start: 2023-04-10 | End: 2024-01-10

## 2024-01-10 RX ORDER — FAMOTIDINE 40 MG/1
40 TABLET, FILM COATED ORAL
Refills: 0 | Status: COMPLETED | COMMUNITY
End: 2024-01-10

## 2024-01-10 RX ORDER — ATORVASTATIN CALCIUM 40 MG/1
40 TABLET, FILM COATED ORAL DAILY
Refills: 0 | Status: ACTIVE | COMMUNITY

## 2024-01-10 RX ORDER — LOSARTAN POTASSIUM 50 MG/1
50 TABLET, FILM COATED ORAL DAILY
Refills: 0 | Status: ACTIVE | COMMUNITY

## 2024-01-10 RX ORDER — METOPROLOL SUCCINATE 50 MG/1
50 TABLET, EXTENDED RELEASE ORAL DAILY
Refills: 3 | Status: ACTIVE | COMMUNITY

## 2024-01-10 RX ORDER — WARFARIN 2.5 MG/1
2.5 TABLET ORAL EVERY OTHER DAY
Refills: 0 | Status: ACTIVE | COMMUNITY

## 2024-01-10 RX ORDER — WARFARIN 5 MG/1
5 TABLET ORAL EVERY OTHER DAY
Refills: 0 | Status: ACTIVE | COMMUNITY

## 2024-01-10 NOTE — ASSESSMENT
[FreeTextEntry1] : Mr. Wooten presents for routine follow up of subcutaneous ICD, which was implanted for primary prevention for dilated cardiomyopathy.    # DCM s/p subcutaneous ICD - Interrogation as above  - Remote monitor is set up and patient is manually transmitting  every Wednesday   - Cont  metoprolol, cozaar and furosemiide- - BP is well controlled  # Mechanical Aortic Valve - Cont coumadin. Denies s/sx of bleeding. Follows up with coumadin clinic   I have also advised the patient to go to the nearest emergency room if he experiences any chest pain, dyspnea, syncope, or has any other compelling symptoms.    Follow up in 9 months

## 2024-01-10 NOTE — PROCEDURE
[No] : not [NSR] : normal sinus rhythm [See Device Printout] : See device printout [ICD] : Implantable cardioverter-defibrillator [Longevity: ___ months] : The estimated remaining battery life is [unfilled] months [Normal] : The battery status is normal. [None] : none [Impedance: ___ Ohms] : current cell impedance is [unfilled] Ohms [de-identified] : [facility] [de-identified] : A219 [de-identified] : [id number] [de-identified] : 01/03/2019 [de-identified] : Battery OK, 45% [de-identified] : uses primary vector No Recordings or Episodes.  Pt is monitored remotely - manual transmission every Wed

## 2024-01-10 NOTE — CARDIOLOGY SUMMARY
[de-identified] : 4/12/2023 NSR (HR 89 bpm)\par  6/15/2022 NSR (HR 84 bpm)\par  3/16/2022 NSR (HR 76 bpm) [de-identified] : 7/13/2022 Stress ECHO\par  SUSIE; 10:01 min, 87% MPHR, 10.5 METS\par  EF 55% at rest and with exercise > 65%\par  ECG findings are c/w ischemia in leads II, III, aVF. Normal echo response to exercise. [de-identified] : 8/19/2020 EF 50-55% LAE. Mechanical aortic and mitral valve. \par  10/29/18 Dilated LV; mild LVH; Mechanical Mitral and mechanical aortic valve with mild AI. LVEF 25%. \par   [de-identified] : 1/3/2019 - [employer] [facility] [de-identified] : 6/30/17, Patent coronary arteries

## 2024-01-10 NOTE — HISTORY OF PRESENT ILLNESS
[de-identified] : Cardiologist: Dr. 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 Patient states that was the first time he was ever informed of cardiomyopathy. He was experiencing heart failure symptoms including dyspnea at rest and PND. He was prescribed Entresto but couldn't afford it due to insurance issues. Since discharge, he took his meds as prescribed. He wore LifeVest and had subcutaneous ICD implanted on 1/3/2019. He has a remote monitor set up. He has not been shocked by his device.   He is here for routine device check. He denies chest pain, dyspnea, dizziness, lightheadedness, presyncope or syncope. No PND or lower extremity edema. Stable 2 pillow orthopnea.

## 2024-01-10 NOTE — PHYSICAL EXAM
[General Appearance - Well Developed] : well developed [Normal Appearance] : normal appearance [Well Groomed] : well groomed [General Appearance - Well Nourished] : well nourished [No Deformities] : no deformities [General Appearance - In No Acute Distress] : no acute distress [Heart Rate And Rhythm] : heart rate and rhythm were normal [Heart Sounds] : normal S1 and S2 [Edema] : no peripheral edema present [] : no respiratory distress [Respiration, Rhythm And Depth] : normal respiratory rhythm and effort [Exaggerated Use Of Accessory Muscles For Inspiration] : no accessory muscle use [Auscultation Breath Sounds / Voice Sounds] : lungs were clear to auscultation bilaterally [Well-Healed] : well-healed [Abdomen Soft] : soft [Nail Clubbing] : no clubbing of the fingernails [Cyanosis, Localized] : no localized cyanosis [Erythema] : not erythematous [Warm] : not warm [Tender] : not tender [FreeTextEntry1] : Left mid-axillary and subxiphoid incisions are well healed

## 2024-02-02 ENCOUNTER — OUTPATIENT (OUTPATIENT)
Dept: OUTPATIENT SERVICES | Facility: HOSPITAL | Age: 49
LOS: 1 days | End: 2024-02-02
Payer: COMMERCIAL

## 2024-02-02 ENCOUNTER — APPOINTMENT (OUTPATIENT)
Dept: MEDICATION MANAGEMENT | Facility: CLINIC | Age: 49
End: 2024-02-02

## 2024-02-02 DIAGNOSIS — Z79.01 LONG TERM (CURRENT) USE OF ANTICOAGULANTS: ICD-10-CM

## 2024-02-02 DIAGNOSIS — Z98.890 OTHER SPECIFIED POSTPROCEDURAL STATES: Chronic | ICD-10-CM

## 2024-02-02 DIAGNOSIS — Z95.2 PRESENCE OF PROSTHETIC HEART VALVE: ICD-10-CM

## 2024-02-02 DIAGNOSIS — Z95.2 PRESENCE OF PROSTHETIC HEART VALVE: Chronic | ICD-10-CM

## 2024-02-02 LAB
INR PPP: 3.4 RATIO
POCT-PROTHROMBIN TIME: 40.8 SECS
QUALITY CONTROL: YES

## 2024-02-02 PROCEDURE — 36416 COLLJ CAPILLARY BLOOD SPEC: CPT

## 2024-02-02 PROCEDURE — 85610 PROTHROMBIN TIME: CPT

## 2024-02-02 PROCEDURE — 99211 OFF/OP EST MAY X REQ PHY/QHP: CPT

## 2024-02-03 DIAGNOSIS — Z79.01 LONG TERM (CURRENT) USE OF ANTICOAGULANTS: ICD-10-CM

## 2024-02-03 DIAGNOSIS — Z95.2 PRESENCE OF PROSTHETIC HEART VALVE: ICD-10-CM

## 2024-03-01 ENCOUNTER — OUTPATIENT (OUTPATIENT)
Dept: OUTPATIENT SERVICES | Facility: HOSPITAL | Age: 49
LOS: 1 days | End: 2024-03-01
Payer: COMMERCIAL

## 2024-03-01 ENCOUNTER — APPOINTMENT (OUTPATIENT)
Dept: MEDICATION MANAGEMENT | Facility: CLINIC | Age: 49
End: 2024-03-01

## 2024-03-01 DIAGNOSIS — Z98.890 OTHER SPECIFIED POSTPROCEDURAL STATES: Chronic | ICD-10-CM

## 2024-03-01 DIAGNOSIS — Z79.01 LONG TERM (CURRENT) USE OF ANTICOAGULANTS: ICD-10-CM

## 2024-03-01 DIAGNOSIS — Z95.2 PRESENCE OF PROSTHETIC HEART VALVE: Chronic | ICD-10-CM

## 2024-03-01 LAB
INR PPP: 3.9 RATIO
POCT-PROTHROMBIN TIME: 46.7 SECS
QUALITY CONTROL: YES

## 2024-03-01 PROCEDURE — 85610 PROTHROMBIN TIME: CPT

## 2024-03-01 PROCEDURE — 36416 COLLJ CAPILLARY BLOOD SPEC: CPT

## 2024-03-01 PROCEDURE — 99211 OFF/OP EST MAY X REQ PHY/QHP: CPT

## 2024-03-02 DIAGNOSIS — Z79.01 LONG TERM (CURRENT) USE OF ANTICOAGULANTS: ICD-10-CM

## 2024-03-15 ENCOUNTER — OUTPATIENT (OUTPATIENT)
Dept: OUTPATIENT SERVICES | Facility: HOSPITAL | Age: 49
LOS: 1 days | End: 2024-03-15
Payer: COMMERCIAL

## 2024-03-15 ENCOUNTER — APPOINTMENT (OUTPATIENT)
Dept: MEDICATION MANAGEMENT | Facility: CLINIC | Age: 49
End: 2024-03-15

## 2024-03-15 DIAGNOSIS — Z98.890 OTHER SPECIFIED POSTPROCEDURAL STATES: Chronic | ICD-10-CM

## 2024-03-15 DIAGNOSIS — Z95.2 PRESENCE OF PROSTHETIC HEART VALVE: Chronic | ICD-10-CM

## 2024-03-15 DIAGNOSIS — Z79.01 LONG TERM (CURRENT) USE OF ANTICOAGULANTS: ICD-10-CM

## 2024-03-15 DIAGNOSIS — Z95.2 PRESENCE OF PROSTHETIC HEART VALVE: ICD-10-CM

## 2024-03-15 LAB
INR PPP: 4.6 RATIO
POCT-PROTHROMBIN TIME: 54.9 SECS
QUALITY CONTROL: YES

## 2024-03-15 PROCEDURE — 85610 PROTHROMBIN TIME: CPT

## 2024-03-15 PROCEDURE — 99211 OFF/OP EST MAY X REQ PHY/QHP: CPT

## 2024-03-15 PROCEDURE — 36416 COLLJ CAPILLARY BLOOD SPEC: CPT

## 2024-03-16 DIAGNOSIS — Z79.01 LONG TERM (CURRENT) USE OF ANTICOAGULANTS: ICD-10-CM

## 2024-03-16 DIAGNOSIS — Z95.2 PRESENCE OF PROSTHETIC HEART VALVE: ICD-10-CM

## 2024-03-22 ENCOUNTER — OUTPATIENT (OUTPATIENT)
Dept: OUTPATIENT SERVICES | Facility: HOSPITAL | Age: 49
LOS: 1 days | End: 2024-03-22
Payer: COMMERCIAL

## 2024-03-22 ENCOUNTER — APPOINTMENT (OUTPATIENT)
Dept: MEDICATION MANAGEMENT | Facility: CLINIC | Age: 49
End: 2024-03-22

## 2024-03-22 DIAGNOSIS — Z98.890 OTHER SPECIFIED POSTPROCEDURAL STATES: Chronic | ICD-10-CM

## 2024-03-22 DIAGNOSIS — Z79.01 LONG TERM (CURRENT) USE OF ANTICOAGULANTS: ICD-10-CM

## 2024-03-22 DIAGNOSIS — Z95.2 PRESENCE OF PROSTHETIC HEART VALVE: Chronic | ICD-10-CM

## 2024-03-22 DIAGNOSIS — Z95.2 PRESENCE OF PROSTHETIC HEART VALVE: ICD-10-CM

## 2024-03-22 LAB
INR PPP: 4.5 RATIO
POCT-PROTHROMBIN TIME: 53.5 SECS
QUALITY CONTROL: YES

## 2024-03-22 PROCEDURE — 36416 COLLJ CAPILLARY BLOOD SPEC: CPT

## 2024-03-22 PROCEDURE — 85610 PROTHROMBIN TIME: CPT

## 2024-03-22 PROCEDURE — 99211 OFF/OP EST MAY X REQ PHY/QHP: CPT

## 2024-03-23 DIAGNOSIS — Z95.2 PRESENCE OF PROSTHETIC HEART VALVE: ICD-10-CM

## 2024-03-23 DIAGNOSIS — Z79.01 LONG TERM (CURRENT) USE OF ANTICOAGULANTS: ICD-10-CM

## 2024-04-05 ENCOUNTER — APPOINTMENT (OUTPATIENT)
Dept: MEDICATION MANAGEMENT | Facility: CLINIC | Age: 49
End: 2024-04-05

## 2024-04-05 ENCOUNTER — OUTPATIENT (OUTPATIENT)
Dept: OUTPATIENT SERVICES | Facility: HOSPITAL | Age: 49
LOS: 1 days | End: 2024-04-05
Payer: COMMERCIAL

## 2024-04-05 DIAGNOSIS — Z95.2 PRESENCE OF PROSTHETIC HEART VALVE: Chronic | ICD-10-CM

## 2024-04-05 DIAGNOSIS — Z95.2 PRESENCE OF PROSTHETIC HEART VALVE: ICD-10-CM

## 2024-04-05 DIAGNOSIS — Z98.890 OTHER SPECIFIED POSTPROCEDURAL STATES: Chronic | ICD-10-CM

## 2024-04-05 DIAGNOSIS — Z79.01 LONG TERM (CURRENT) USE OF ANTICOAGULANTS: ICD-10-CM

## 2024-04-05 LAB
INR PPP: 2.3 RATIO
POCT-PROTHROMBIN TIME: 28.1 SECS
QUALITY CONTROL: YES

## 2024-04-05 PROCEDURE — 36416 COLLJ CAPILLARY BLOOD SPEC: CPT

## 2024-04-05 PROCEDURE — 99211 OFF/OP EST MAY X REQ PHY/QHP: CPT

## 2024-04-05 PROCEDURE — 85610 PROTHROMBIN TIME: CPT

## 2024-04-06 DIAGNOSIS — Z79.01 LONG TERM (CURRENT) USE OF ANTICOAGULANTS: ICD-10-CM

## 2024-04-06 DIAGNOSIS — Z95.2 PRESENCE OF PROSTHETIC HEART VALVE: ICD-10-CM

## 2024-04-10 ENCOUNTER — APPOINTMENT (OUTPATIENT)
Dept: CARDIOLOGY | Facility: CLINIC | Age: 49
End: 2024-04-10
Payer: SELF-PAY

## 2024-04-10 ENCOUNTER — NON-APPOINTMENT (OUTPATIENT)
Age: 49
End: 2024-04-10

## 2024-04-10 PROCEDURE — 93295 DEV INTERROG REMOTE 1/2/MLT: CPT

## 2024-04-10 PROCEDURE — 93296 REM INTERROG EVL PM/IDS: CPT

## 2024-05-03 ENCOUNTER — APPOINTMENT (OUTPATIENT)
Dept: MEDICATION MANAGEMENT | Facility: CLINIC | Age: 49
End: 2024-05-03

## 2024-05-03 ENCOUNTER — OUTPATIENT (OUTPATIENT)
Dept: OUTPATIENT SERVICES | Facility: HOSPITAL | Age: 49
LOS: 1 days | End: 2024-05-03
Payer: COMMERCIAL

## 2024-05-03 DIAGNOSIS — Z95.2 PRESENCE OF PROSTHETIC HEART VALVE: Chronic | ICD-10-CM

## 2024-05-03 DIAGNOSIS — Z95.2 PRESENCE OF PROSTHETIC HEART VALVE: ICD-10-CM

## 2024-05-03 DIAGNOSIS — Z79.01 LONG TERM (CURRENT) USE OF ANTICOAGULANTS: ICD-10-CM

## 2024-05-03 DIAGNOSIS — Z98.890 OTHER SPECIFIED POSTPROCEDURAL STATES: Chronic | ICD-10-CM

## 2024-05-03 LAB
INR PPP: 2.9 RATIO
POCT-PROTHROMBIN TIME: 35 SECS
QUALITY CONTROL: YES

## 2024-05-03 PROCEDURE — 85610 PROTHROMBIN TIME: CPT

## 2024-05-03 PROCEDURE — 36416 COLLJ CAPILLARY BLOOD SPEC: CPT

## 2024-05-03 PROCEDURE — 99211 OFF/OP EST MAY X REQ PHY/QHP: CPT

## 2024-05-04 DIAGNOSIS — Z79.01 LONG TERM (CURRENT) USE OF ANTICOAGULANTS: ICD-10-CM

## 2024-05-04 DIAGNOSIS — Z95.2 PRESENCE OF PROSTHETIC HEART VALVE: ICD-10-CM

## 2024-05-31 ENCOUNTER — OUTPATIENT (OUTPATIENT)
Dept: OUTPATIENT SERVICES | Facility: HOSPITAL | Age: 49
LOS: 1 days | End: 2024-05-31
Payer: COMMERCIAL

## 2024-05-31 ENCOUNTER — APPOINTMENT (OUTPATIENT)
Dept: MEDICATION MANAGEMENT | Facility: CLINIC | Age: 49
End: 2024-05-31

## 2024-05-31 DIAGNOSIS — Z95.2 PRESENCE OF PROSTHETIC HEART VALVE: Chronic | ICD-10-CM

## 2024-05-31 DIAGNOSIS — Z79.01 LONG TERM (CURRENT) USE OF ANTICOAGULANTS: ICD-10-CM

## 2024-05-31 DIAGNOSIS — Z95.2 PRESENCE OF PROSTHETIC HEART VALVE: ICD-10-CM

## 2024-05-31 DIAGNOSIS — Z98.890 OTHER SPECIFIED POSTPROCEDURAL STATES: Chronic | ICD-10-CM

## 2024-05-31 LAB
INR PPP: 3 RATIO
POCT-PROTHROMBIN TIME: 36.3 SECS
QUALITY CONTROL: YES

## 2024-05-31 PROCEDURE — 36416 COLLJ CAPILLARY BLOOD SPEC: CPT

## 2024-05-31 PROCEDURE — 85610 PROTHROMBIN TIME: CPT

## 2024-05-31 PROCEDURE — 99211 OFF/OP EST MAY X REQ PHY/QHP: CPT

## 2024-06-01 DIAGNOSIS — Z79.01 LONG TERM (CURRENT) USE OF ANTICOAGULANTS: ICD-10-CM

## 2024-06-01 DIAGNOSIS — Z95.2 PRESENCE OF PROSTHETIC HEART VALVE: ICD-10-CM

## 2024-06-11 ENCOUNTER — OUTPATIENT (OUTPATIENT)
Dept: OUTPATIENT SERVICES | Facility: HOSPITAL | Age: 49
LOS: 1 days | End: 2024-06-11
Payer: OTHER MISCELLANEOUS

## 2024-06-11 DIAGNOSIS — M51.26 OTHER INTERVERTEBRAL DISC DISPLACEMENT, LUMBAR REGION: ICD-10-CM

## 2024-06-11 DIAGNOSIS — Z98.890 OTHER SPECIFIED POSTPROCEDURAL STATES: Chronic | ICD-10-CM

## 2024-06-11 DIAGNOSIS — Z95.2 PRESENCE OF PROSTHETIC HEART VALVE: Chronic | ICD-10-CM

## 2024-06-11 DIAGNOSIS — Z00.8 ENCOUNTER FOR OTHER GENERAL EXAMINATION: ICD-10-CM

## 2024-06-11 DIAGNOSIS — M54.16 RADICULOPATHY, LUMBAR REGION: ICD-10-CM

## 2024-06-11 PROCEDURE — 72148 MRI LUMBAR SPINE W/O DYE: CPT

## 2024-06-11 PROCEDURE — 72148 MRI LUMBAR SPINE W/O DYE: CPT | Mod: 26

## 2024-06-12 DIAGNOSIS — M54.16 RADICULOPATHY, LUMBAR REGION: ICD-10-CM

## 2024-06-12 DIAGNOSIS — M51.26 OTHER INTERVERTEBRAL DISC DISPLACEMENT, LUMBAR REGION: ICD-10-CM

## 2024-06-28 ENCOUNTER — OUTPATIENT (OUTPATIENT)
Dept: OUTPATIENT SERVICES | Facility: HOSPITAL | Age: 49
LOS: 1 days | End: 2024-06-28
Payer: COMMERCIAL

## 2024-06-28 ENCOUNTER — APPOINTMENT (OUTPATIENT)
Dept: MEDICATION MANAGEMENT | Facility: CLINIC | Age: 49
End: 2024-06-28

## 2024-06-28 DIAGNOSIS — Z98.890 OTHER SPECIFIED POSTPROCEDURAL STATES: Chronic | ICD-10-CM

## 2024-06-28 DIAGNOSIS — Z95.2 PRESENCE OF PROSTHETIC HEART VALVE: ICD-10-CM

## 2024-06-28 DIAGNOSIS — Z95.2 PRESENCE OF PROSTHETIC HEART VALVE: Chronic | ICD-10-CM

## 2024-06-28 DIAGNOSIS — Z79.01 LONG TERM (CURRENT) USE OF ANTICOAGULANTS: ICD-10-CM

## 2024-06-28 LAB
INR PPP: 2 RATIO
POCT-PROTHROMBIN TIME: 23.4 SECS
QUALITY CONTROL: YES

## 2024-06-28 PROCEDURE — 85610 PROTHROMBIN TIME: CPT

## 2024-06-28 PROCEDURE — 36416 COLLJ CAPILLARY BLOOD SPEC: CPT

## 2024-06-28 PROCEDURE — 99211 OFF/OP EST MAY X REQ PHY/QHP: CPT

## 2024-06-29 DIAGNOSIS — Z79.01 LONG TERM (CURRENT) USE OF ANTICOAGULANTS: ICD-10-CM

## 2024-06-29 DIAGNOSIS — Z95.2 PRESENCE OF PROSTHETIC HEART VALVE: ICD-10-CM

## 2024-07-10 ENCOUNTER — NON-APPOINTMENT (OUTPATIENT)
Age: 49
End: 2024-07-10

## 2024-07-10 ENCOUNTER — APPOINTMENT (OUTPATIENT)
Dept: CARDIOLOGY | Facility: CLINIC | Age: 49
End: 2024-07-10
Payer: SELF-PAY

## 2024-07-10 PROCEDURE — 93296 REM INTERROG EVL PM/IDS: CPT

## 2024-07-10 PROCEDURE — 93295 DEV INTERROG REMOTE 1/2/MLT: CPT

## 2024-07-12 ENCOUNTER — OUTPATIENT (OUTPATIENT)
Dept: OUTPATIENT SERVICES | Facility: HOSPITAL | Age: 49
LOS: 1 days | End: 2024-07-12
Payer: OTHER MISCELLANEOUS

## 2024-07-12 ENCOUNTER — APPOINTMENT (OUTPATIENT)
Dept: MEDICATION MANAGEMENT | Facility: CLINIC | Age: 49
End: 2024-07-12

## 2024-07-12 DIAGNOSIS — Z98.890 OTHER SPECIFIED POSTPROCEDURAL STATES: Chronic | ICD-10-CM

## 2024-07-12 DIAGNOSIS — Z95.2 PRESENCE OF PROSTHETIC HEART VALVE: Chronic | ICD-10-CM

## 2024-07-12 DIAGNOSIS — Z95.2 PRESENCE OF PROSTHETIC HEART VALVE: ICD-10-CM

## 2024-07-12 DIAGNOSIS — Z79.01 LONG TERM (CURRENT) USE OF ANTICOAGULANTS: ICD-10-CM

## 2024-07-12 LAB
INR PPP: 2.2 RATIO
POCT-PROTHROMBIN TIME: 26.2 SECS
QUALITY CONTROL: YES

## 2024-07-12 PROCEDURE — 36416 COLLJ CAPILLARY BLOOD SPEC: CPT

## 2024-07-12 PROCEDURE — 85610 PROTHROMBIN TIME: CPT

## 2024-07-12 PROCEDURE — 99211 OFF/OP EST MAY X REQ PHY/QHP: CPT

## 2024-07-13 DIAGNOSIS — Z95.2 PRESENCE OF PROSTHETIC HEART VALVE: ICD-10-CM

## 2024-07-13 DIAGNOSIS — Z79.01 LONG TERM (CURRENT) USE OF ANTICOAGULANTS: ICD-10-CM

## 2024-07-26 ENCOUNTER — APPOINTMENT (OUTPATIENT)
Dept: MEDICATION MANAGEMENT | Facility: CLINIC | Age: 49
End: 2024-07-26

## 2024-07-26 LAB
INR PPP: 2.3 RATIO
POCT-PROTHROMBIN TIME: 27.6 SECS
QUALITY CONTROL: YES

## 2024-08-23 ENCOUNTER — OUTPATIENT (OUTPATIENT)
Dept: OUTPATIENT SERVICES | Facility: HOSPITAL | Age: 49
LOS: 1 days | End: 2024-08-23
Payer: COMMERCIAL

## 2024-08-23 ENCOUNTER — APPOINTMENT (OUTPATIENT)
Dept: MEDICATION MANAGEMENT | Facility: CLINIC | Age: 49
End: 2024-08-23

## 2024-08-23 DIAGNOSIS — Z95.2 PRESENCE OF PROSTHETIC HEART VALVE: ICD-10-CM

## 2024-08-23 DIAGNOSIS — Z79.01 LONG TERM (CURRENT) USE OF ANTICOAGULANTS: ICD-10-CM

## 2024-08-23 DIAGNOSIS — Z98.890 OTHER SPECIFIED POSTPROCEDURAL STATES: Chronic | ICD-10-CM

## 2024-08-23 DIAGNOSIS — Z95.2 PRESENCE OF PROSTHETIC HEART VALVE: Chronic | ICD-10-CM

## 2024-08-23 LAB
INR PPP: 3.2 RATIO
POCT-PROTHROMBIN TIME: 37.8 SECS
QUALITY CONTROL: YES

## 2024-08-23 PROCEDURE — 36416 COLLJ CAPILLARY BLOOD SPEC: CPT

## 2024-08-23 PROCEDURE — 99211 OFF/OP EST MAY X REQ PHY/QHP: CPT

## 2024-08-23 PROCEDURE — 85610 PROTHROMBIN TIME: CPT

## 2024-08-24 DIAGNOSIS — Z95.2 PRESENCE OF PROSTHETIC HEART VALVE: ICD-10-CM

## 2024-08-24 DIAGNOSIS — Z79.01 LONG TERM (CURRENT) USE OF ANTICOAGULANTS: ICD-10-CM

## 2024-09-20 ENCOUNTER — OUTPATIENT (OUTPATIENT)
Dept: OUTPATIENT SERVICES | Facility: HOSPITAL | Age: 49
LOS: 1 days | End: 2024-09-20

## 2024-09-20 ENCOUNTER — APPOINTMENT (OUTPATIENT)
Dept: MEDICATION MANAGEMENT | Facility: CLINIC | Age: 49
End: 2024-09-20

## 2024-09-20 DIAGNOSIS — Z95.2 PRESENCE OF PROSTHETIC HEART VALVE: Chronic | ICD-10-CM

## 2024-09-20 DIAGNOSIS — Z98.890 OTHER SPECIFIED POSTPROCEDURAL STATES: Chronic | ICD-10-CM

## 2024-09-20 DIAGNOSIS — Z79.01 LONG TERM (CURRENT) USE OF ANTICOAGULANTS: ICD-10-CM

## 2024-09-20 LAB
INR PPP: 2.1 RATIO
POCT-PROTHROMBIN TIME: 25.8 SECS
QUALITY CONTROL: YES

## 2024-09-20 PROCEDURE — 85610 PROTHROMBIN TIME: CPT

## 2024-09-20 PROCEDURE — 36416 COLLJ CAPILLARY BLOOD SPEC: CPT

## 2024-09-20 PROCEDURE — 99211 OFF/OP EST MAY X REQ PHY/QHP: CPT

## 2024-09-21 DIAGNOSIS — Z79.01 LONG TERM (CURRENT) USE OF ANTICOAGULANTS: ICD-10-CM

## 2024-09-21 DIAGNOSIS — Z95.2 PRESENCE OF PROSTHETIC HEART VALVE: ICD-10-CM

## 2024-10-15 NOTE — ED ADULT NURSE NOTE - P[facility]
Admission/Transfer to [address] [place] [address]    Admission to inpatient unit Date:10/15/24  at 1617 to room 8259-2 in stable condition from unit ED. Orientation to unit and plan of care discussed with pt. Call light and personal belongings within reach. Will continue to monitor.     Report Received from: [facility]    [place] Risk: Yes  Code Status: Full Resuscitation   Items sent to Security: No      Vital signs on admission: Visit Vitals  BP (!) 151/78   Pulse 73   Temp 98.8 °F (37.1 °C) (Oral)   Resp 17   Ht 5' 7\" (1.702 m)   Wt 80.2 kg (176 lb 12.9 oz)   SpO2 99%   BMI 27.69 kg/m²        Aortic valve replaced    H/O hand surgery    H/O hernia repair    H/O mitral valve replacement

## 2024-10-18 ENCOUNTER — OUTPATIENT (OUTPATIENT)
Dept: OUTPATIENT SERVICES | Facility: HOSPITAL | Age: 49
LOS: 1 days | End: 2024-10-18
Payer: COMMERCIAL

## 2024-10-18 ENCOUNTER — APPOINTMENT (OUTPATIENT)
Dept: MEDICATION MANAGEMENT | Facility: CLINIC | Age: 49
End: 2024-10-18

## 2024-10-18 DIAGNOSIS — Z98.890 OTHER SPECIFIED POSTPROCEDURAL STATES: Chronic | ICD-10-CM

## 2024-10-18 DIAGNOSIS — Z95.2 PRESENCE OF PROSTHETIC HEART VALVE: ICD-10-CM

## 2024-10-18 DIAGNOSIS — Z95.2 PRESENCE OF PROSTHETIC HEART VALVE: Chronic | ICD-10-CM

## 2024-10-18 DIAGNOSIS — Z79.01 LONG TERM (CURRENT) USE OF ANTICOAGULANTS: ICD-10-CM

## 2024-10-18 LAB
INR PPP: 3.6 RATIO
POCT-PROTHROMBIN TIME: 43.5 SECS
QUALITY CONTROL: YES

## 2024-10-18 PROCEDURE — 85610 PROTHROMBIN TIME: CPT

## 2024-10-18 PROCEDURE — 99211 OFF/OP EST MAY X REQ PHY/QHP: CPT

## 2024-10-18 PROCEDURE — 36416 COLLJ CAPILLARY BLOOD SPEC: CPT

## 2024-10-19 DIAGNOSIS — Z95.2 PRESENCE OF PROSTHETIC HEART VALVE: ICD-10-CM

## 2024-10-19 DIAGNOSIS — Z79.01 LONG TERM (CURRENT) USE OF ANTICOAGULANTS: ICD-10-CM

## 2024-10-24 ENCOUNTER — APPOINTMENT (OUTPATIENT)
Dept: CARDIOLOGY | Facility: CLINIC | Age: 49
End: 2024-10-24

## 2024-10-24 PROCEDURE — 93296 REM INTERROG EVL PM/IDS: CPT

## 2024-10-24 PROCEDURE — 93295 DEV INTERROG REMOTE 1/2/MLT: CPT

## 2024-11-15 ENCOUNTER — APPOINTMENT (OUTPATIENT)
Dept: MEDICATION MANAGEMENT | Facility: CLINIC | Age: 49
End: 2024-11-15

## 2024-11-15 ENCOUNTER — OUTPATIENT (OUTPATIENT)
Dept: OUTPATIENT SERVICES | Facility: HOSPITAL | Age: 49
LOS: 1 days | End: 2024-11-15
Payer: COMMERCIAL

## 2024-11-15 DIAGNOSIS — Z98.890 OTHER SPECIFIED POSTPROCEDURAL STATES: Chronic | ICD-10-CM

## 2024-11-15 DIAGNOSIS — Z95.2 PRESENCE OF PROSTHETIC HEART VALVE: Chronic | ICD-10-CM

## 2024-11-15 DIAGNOSIS — Z79.01 LONG TERM (CURRENT) USE OF ANTICOAGULANTS: ICD-10-CM

## 2024-11-15 LAB
INR PPP: 3.4 RATIO
POCT-PROTHROMBIN TIME: 41.1 SECS
QUALITY CONTROL: YES

## 2024-11-15 PROCEDURE — 36416 COLLJ CAPILLARY BLOOD SPEC: CPT

## 2024-11-15 PROCEDURE — 99211 OFF/OP EST MAY X REQ PHY/QHP: CPT

## 2024-11-15 PROCEDURE — 85610 PROTHROMBIN TIME: CPT

## 2024-11-16 DIAGNOSIS — Z95.2 PRESENCE OF PROSTHETIC HEART VALVE: ICD-10-CM

## 2024-11-16 DIAGNOSIS — Z79.01 LONG TERM (CURRENT) USE OF ANTICOAGULANTS: ICD-10-CM

## 2024-11-20 NOTE — REASON FOR VISIT
Detail Level: Detailed
Quality 431: Preventive Care And Screening: Unhealthy Alcohol Use - Screening: Patient not identified as an unhealthy alcohol user when screened for unhealthy alcohol use using a systematic screening method
Quality 226: Preventive Care And Screening: Tobacco Use: Screening And Cessation Intervention: Patient screened for tobacco use and is an ex/non-smoker
[Follow-up Device Check] : is here today for a follow-up device check visit for

## 2024-12-13 ENCOUNTER — OUTPATIENT (OUTPATIENT)
Dept: OUTPATIENT SERVICES | Facility: HOSPITAL | Age: 49
LOS: 1 days | End: 2024-12-13
Payer: COMMERCIAL

## 2024-12-13 ENCOUNTER — APPOINTMENT (OUTPATIENT)
Dept: MEDICATION MANAGEMENT | Facility: CLINIC | Age: 49
End: 2024-12-13

## 2024-12-13 DIAGNOSIS — Z95.2 PRESENCE OF PROSTHETIC HEART VALVE: Chronic | ICD-10-CM

## 2024-12-13 DIAGNOSIS — Z95.2 PRESENCE OF PROSTHETIC HEART VALVE: ICD-10-CM

## 2024-12-13 DIAGNOSIS — Z79.01 LONG TERM (CURRENT) USE OF ANTICOAGULANTS: ICD-10-CM

## 2024-12-13 DIAGNOSIS — Z98.890 OTHER SPECIFIED POSTPROCEDURAL STATES: Chronic | ICD-10-CM

## 2024-12-13 LAB
INR PPP: 0.2 RATIO
POCT-PROTHROMBIN TIME: 26.8 SECS
QUALITY CONTROL: YES

## 2024-12-13 PROCEDURE — 99211 OFF/OP EST MAY X REQ PHY/QHP: CPT

## 2024-12-13 PROCEDURE — 36416 COLLJ CAPILLARY BLOOD SPEC: CPT

## 2024-12-13 PROCEDURE — 85610 PROTHROMBIN TIME: CPT

## 2024-12-14 DIAGNOSIS — Z95.2 PRESENCE OF PROSTHETIC HEART VALVE: ICD-10-CM

## 2024-12-14 DIAGNOSIS — Z79.01 LONG TERM (CURRENT) USE OF ANTICOAGULANTS: ICD-10-CM

## 2025-01-07 ENCOUNTER — APPOINTMENT (OUTPATIENT)
Dept: ELECTROPHYSIOLOGY | Facility: CLINIC | Age: 50
End: 2025-01-07

## 2025-01-10 ENCOUNTER — APPOINTMENT (OUTPATIENT)
Dept: MEDICATION MANAGEMENT | Facility: CLINIC | Age: 50
End: 2025-01-10

## 2025-01-10 ENCOUNTER — OUTPATIENT (OUTPATIENT)
Dept: OUTPATIENT SERVICES | Facility: HOSPITAL | Age: 50
LOS: 1 days | End: 2025-01-10
Payer: COMMERCIAL

## 2025-01-10 DIAGNOSIS — Z79.01 LONG TERM (CURRENT) USE OF ANTICOAGULANTS: ICD-10-CM

## 2025-01-10 DIAGNOSIS — Z98.890 OTHER SPECIFIED POSTPROCEDURAL STATES: Chronic | ICD-10-CM

## 2025-01-10 DIAGNOSIS — Z95.2 PRESENCE OF PROSTHETIC HEART VALVE: ICD-10-CM

## 2025-01-10 DIAGNOSIS — Z95.2 PRESENCE OF PROSTHETIC HEART VALVE: Chronic | ICD-10-CM

## 2025-01-10 LAB
INR PPP: 3 RATIO
POCT-PROTHROMBIN TIME: 364 SECS
QUALITY CONTROL: YES

## 2025-01-10 PROCEDURE — 99211 OFF/OP EST MAY X REQ PHY/QHP: CPT

## 2025-01-10 PROCEDURE — 36416 COLLJ CAPILLARY BLOOD SPEC: CPT

## 2025-01-10 PROCEDURE — 85610 PROTHROMBIN TIME: CPT

## 2025-01-11 DIAGNOSIS — Z79.01 LONG TERM (CURRENT) USE OF ANTICOAGULANTS: ICD-10-CM

## 2025-02-07 ENCOUNTER — OUTPATIENT (OUTPATIENT)
Dept: OUTPATIENT SERVICES | Facility: HOSPITAL | Age: 50
LOS: 1 days | End: 2025-02-07
Payer: COMMERCIAL

## 2025-02-07 ENCOUNTER — APPOINTMENT (OUTPATIENT)
Dept: MEDICATION MANAGEMENT | Facility: CLINIC | Age: 50
End: 2025-02-07

## 2025-02-07 DIAGNOSIS — Z98.890 OTHER SPECIFIED POSTPROCEDURAL STATES: Chronic | ICD-10-CM

## 2025-02-07 DIAGNOSIS — Z79.01 LONG TERM (CURRENT) USE OF ANTICOAGULANTS: ICD-10-CM

## 2025-02-07 DIAGNOSIS — Z95.2 PRESENCE OF PROSTHETIC HEART VALVE: Chronic | ICD-10-CM

## 2025-02-07 LAB
INR PPP: 2.8 RATIO
POCT-PROTHROMBIN TIME: 33.4 SECS
QUALITY CONTROL: YES

## 2025-02-07 PROCEDURE — 99211 OFF/OP EST MAY X REQ PHY/QHP: CPT

## 2025-02-07 PROCEDURE — 85610 PROTHROMBIN TIME: CPT

## 2025-02-07 PROCEDURE — 36416 COLLJ CAPILLARY BLOOD SPEC: CPT

## 2025-02-08 DIAGNOSIS — Z79.01 LONG TERM (CURRENT) USE OF ANTICOAGULANTS: ICD-10-CM

## 2025-02-08 DIAGNOSIS — Z95.2 PRESENCE OF PROSTHETIC HEART VALVE: ICD-10-CM

## 2025-02-10 ENCOUNTER — NON-APPOINTMENT (OUTPATIENT)
Age: 50
End: 2025-02-10

## 2025-02-10 ENCOUNTER — APPOINTMENT (OUTPATIENT)
Dept: ELECTROPHYSIOLOGY | Facility: CLINIC | Age: 50
End: 2025-02-10
Payer: COMMERCIAL

## 2025-02-10 VITALS
HEART RATE: 88 BPM | SYSTOLIC BLOOD PRESSURE: 125 MMHG | WEIGHT: 210 LBS | BODY MASS INDEX: 33.75 KG/M2 | HEIGHT: 66 IN | DIASTOLIC BLOOD PRESSURE: 75 MMHG

## 2025-02-10 DIAGNOSIS — Z45.02 ENCOUNTER FOR ADJUSTMENT AND MANAGEMENT OF AUTOMATIC IMPLANTABLE CARDIAC DEFIBRILLATOR: ICD-10-CM

## 2025-02-10 DIAGNOSIS — I42.0 DILATED CARDIOMYOPATHY: ICD-10-CM

## 2025-02-10 DIAGNOSIS — Z79.01 LONG TERM (CURRENT) USE OF ANTICOAGULANTS: ICD-10-CM

## 2025-02-10 PROCEDURE — 93282 PRGRMG EVAL IMPLANTABLE DFB: CPT

## 2025-02-10 PROCEDURE — 99214 OFFICE O/P EST MOD 30 MIN: CPT

## 2025-02-10 PROCEDURE — 93000 ELECTROCARDIOGRAM COMPLETE: CPT | Mod: 59

## 2025-02-25 NOTE — PROGRESS NOTE ADULT - SUBJECTIVE AND OBJECTIVE BOX
Annual C OVER NIGHT EVENTS:  Doing well; S/p GARRET yesterday    PHYSICAL EXAM    ICU Vital Signs Last 24 Hrs  T(C): 36.9 (26 Jul 2018 07:20), Max: 36.9 (26 Jul 2018 07:20)  T(F): 98.4 (26 Jul 2018 07:20), Max: 98.4 (26 Jul 2018 07:20)  HR: 104 (26 Jul 2018 07:13) (93 - 110)  BP: 97/62 (26 Jul 2018 07:13) (91/54 - 119/74)  BP(mean): 72 (26 Jul 2018 07:13) (67 - 86)  RR: 18 (26 Jul 2018 07:20) (15 - 18)  SpO2: 94% (26 Jul 2018 05:46) (94% - 98%)    I&O's Detail    25 Jul 2018 07:01  -  26 Jul 2018 07:00  --------------------------------------------------------  IN:    Oral Fluid: 1100 mL  Total IN: 1100 mL    OUT:    Voided: 875 mL  Total OUT: 875 mL    Total NET: 225 mL      General: Comfortable in bed  HEENT:  On RA  Lungs: CTA  Cardiovascular: RRR, S1S2  Abdomen: BS+ve; soft non tender  Extremities: No LE edema  Neurological:  AAOx3; No focal deficit      LABS:    07-25    142  |  99  |  19  ----------------------------<  107<H>  4.8   |  33<H>  |  1.3    Ca    8.9      25 Jul 2018 07:00    TPro  6.7  /  Alb  3.9  /  TBili  1.9<H>  /  DBili  x   /  AST  25  /  ALT  35  /  AlkPhos  60  07-25      PT/INR - ( 25 Jul 2018 07:00 )   PT: 25.10 sec;   INR: 2.29 ratio         PTT - ( 25 Jul 2018 07:00 )  PTT:38.5 sec                                             LIVER FUNCTIONS - ( 25 Jul 2018 07:00 )  Alb: 3.9 g/dL / Pro: 6.7 g/dL / ALK PHOS: 60 U/L / ALT: 35 U/L / AST: 25 U/L / GGT: x               Lactate (07-21-18 @ 18:30): 1.6    MEDICATIONS  (STANDING):  aspirin enteric coated 81 milliGRAM(s) Oral daily  atorvastatin 40 milliGRAM(s) Oral at bedtime  furosemide    Tablet 40 milliGRAM(s) Oral two times a day  losartan 50 milliGRAM(s) Oral daily  metoprolol tartrate 12.5 milliGRAM(s) Oral four times a day    MEDICATIONS  (PRN):      Radiology:  GARRET:   NO vegetation/thrombus appreciated, BOTH MECHANICAL VALVES FUNCTIONING NORMAL, NO MAJOR REGURGITATION, BUT SEVERE BIV CARDIOMYOPATHY

## 2025-03-07 ENCOUNTER — OUTPATIENT (OUTPATIENT)
Dept: OUTPATIENT SERVICES | Facility: HOSPITAL | Age: 50
LOS: 1 days | End: 2025-03-07
Payer: COMMERCIAL

## 2025-03-07 ENCOUNTER — APPOINTMENT (OUTPATIENT)
Dept: MEDICATION MANAGEMENT | Facility: CLINIC | Age: 50
End: 2025-03-07

## 2025-03-07 DIAGNOSIS — Z98.890 OTHER SPECIFIED POSTPROCEDURAL STATES: Chronic | ICD-10-CM

## 2025-03-07 DIAGNOSIS — Z95.2 PRESENCE OF PROSTHETIC HEART VALVE: Chronic | ICD-10-CM

## 2025-03-07 DIAGNOSIS — Z79.01 LONG TERM (CURRENT) USE OF ANTICOAGULANTS: ICD-10-CM

## 2025-03-07 LAB
INR PPP: 0.4 RATIO
POCT-PROTHROMBIN TIME: 41 SECS
QUALITY CONTROL: YES

## 2025-03-07 PROCEDURE — 99211 OFF/OP EST MAY X REQ PHY/QHP: CPT

## 2025-03-07 PROCEDURE — 85610 PROTHROMBIN TIME: CPT

## 2025-03-07 PROCEDURE — 36416 COLLJ CAPILLARY BLOOD SPEC: CPT

## 2025-03-08 DIAGNOSIS — Z79.01 LONG TERM (CURRENT) USE OF ANTICOAGULANTS: ICD-10-CM

## 2025-03-18 ENCOUNTER — APPOINTMENT (OUTPATIENT)
Dept: CARDIOLOGY | Facility: CLINIC | Age: 50
End: 2025-03-18
Payer: COMMERCIAL

## 2025-03-18 ENCOUNTER — NON-APPOINTMENT (OUTPATIENT)
Age: 50
End: 2025-03-18

## 2025-03-18 PROCEDURE — 93295 DEV INTERROG REMOTE 1/2/MLT: CPT

## 2025-03-18 PROCEDURE — 93296 REM INTERROG EVL PM/IDS: CPT

## 2025-03-21 ENCOUNTER — APPOINTMENT (OUTPATIENT)
Dept: MEDICATION MANAGEMENT | Facility: CLINIC | Age: 50
End: 2025-03-21

## 2025-03-21 ENCOUNTER — OUTPATIENT (OUTPATIENT)
Dept: OUTPATIENT SERVICES | Facility: HOSPITAL | Age: 50
LOS: 1 days | End: 2025-03-21
Payer: COMMERCIAL

## 2025-03-21 DIAGNOSIS — Z98.890 OTHER SPECIFIED POSTPROCEDURAL STATES: Chronic | ICD-10-CM

## 2025-03-21 DIAGNOSIS — Z79.01 LONG TERM (CURRENT) USE OF ANTICOAGULANTS: ICD-10-CM

## 2025-03-21 DIAGNOSIS — Z95.2 PRESENCE OF PROSTHETIC HEART VALVE: Chronic | ICD-10-CM

## 2025-03-21 LAB
INR PPP: 2.9 RATIO
POCT-PROTHROMBIN TIME: 34.2 SECS
QUALITY CONTROL: YES

## 2025-03-21 PROCEDURE — 99211 OFF/OP EST MAY X REQ PHY/QHP: CPT

## 2025-03-21 PROCEDURE — 85610 PROTHROMBIN TIME: CPT

## 2025-03-21 PROCEDURE — 36416 COLLJ CAPILLARY BLOOD SPEC: CPT

## 2025-03-21 RX ORDER — ENOXAPARIN SODIUM 100 MG/ML
100 INJECTION, SOLUTION SUBCUTANEOUS
Qty: 10 | Refills: 3 | Status: ACTIVE | COMMUNITY
Start: 2025-03-21 | End: 1900-01-01

## 2025-03-22 DIAGNOSIS — Z79.01 LONG TERM (CURRENT) USE OF ANTICOAGULANTS: ICD-10-CM

## 2025-03-28 ENCOUNTER — APPOINTMENT (OUTPATIENT)
Dept: MEDICATION MANAGEMENT | Facility: CLINIC | Age: 50
End: 2025-03-28

## 2025-03-28 ENCOUNTER — OUTPATIENT (OUTPATIENT)
Dept: OUTPATIENT SERVICES | Facility: HOSPITAL | Age: 50
LOS: 1 days | End: 2025-03-28
Payer: COMMERCIAL

## 2025-03-28 DIAGNOSIS — Z98.890 OTHER SPECIFIED POSTPROCEDURAL STATES: Chronic | ICD-10-CM

## 2025-03-28 DIAGNOSIS — Z79.01 LONG TERM (CURRENT) USE OF ANTICOAGULANTS: ICD-10-CM

## 2025-03-28 LAB
INR PPP: 1.5 RATIO
POCT-PROTHROMBIN TIME: 17.4 SECS
QUALITY CONTROL: YES

## 2025-03-28 PROCEDURE — 36416 COLLJ CAPILLARY BLOOD SPEC: CPT

## 2025-03-28 PROCEDURE — 85610 PROTHROMBIN TIME: CPT

## 2025-03-28 PROCEDURE — 99211 OFF/OP EST MAY X REQ PHY/QHP: CPT

## 2025-03-29 DIAGNOSIS — Z95.2 PRESENCE OF PROSTHETIC HEART VALVE: ICD-10-CM

## 2025-03-29 DIAGNOSIS — Z79.01 LONG TERM (CURRENT) USE OF ANTICOAGULANTS: ICD-10-CM

## 2025-04-03 ENCOUNTER — OUTPATIENT (OUTPATIENT)
Dept: OUTPATIENT SERVICES | Facility: HOSPITAL | Age: 50
LOS: 1 days | End: 2025-04-03
Payer: COMMERCIAL

## 2025-04-03 ENCOUNTER — APPOINTMENT (OUTPATIENT)
Dept: MEDICATION MANAGEMENT | Facility: CLINIC | Age: 50
End: 2025-04-03

## 2025-04-03 DIAGNOSIS — Z95.2 PRESENCE OF PROSTHETIC HEART VALVE: Chronic | ICD-10-CM

## 2025-04-03 DIAGNOSIS — Z98.890 OTHER SPECIFIED POSTPROCEDURAL STATES: Chronic | ICD-10-CM

## 2025-04-03 DIAGNOSIS — Z79.01 LONG TERM (CURRENT) USE OF ANTICOAGULANTS: ICD-10-CM

## 2025-04-03 LAB
INR PPP: 1.3 RATIO
POCT-PROTHROMBIN TIME: 15.4 SECS
QUALITY CONTROL: YES

## 2025-04-03 PROCEDURE — 85610 PROTHROMBIN TIME: CPT

## 2025-04-03 PROCEDURE — 36416 COLLJ CAPILLARY BLOOD SPEC: CPT

## 2025-04-03 PROCEDURE — 99211 OFF/OP EST MAY X REQ PHY/QHP: CPT

## 2025-04-04 DIAGNOSIS — Z79.01 LONG TERM (CURRENT) USE OF ANTICOAGULANTS: ICD-10-CM

## 2025-04-07 ENCOUNTER — OUTPATIENT (OUTPATIENT)
Dept: OUTPATIENT SERVICES | Facility: HOSPITAL | Age: 50
LOS: 1 days | End: 2025-04-07
Payer: COMMERCIAL

## 2025-04-07 ENCOUNTER — APPOINTMENT (OUTPATIENT)
Dept: MEDICATION MANAGEMENT | Facility: CLINIC | Age: 50
End: 2025-04-07

## 2025-04-07 DIAGNOSIS — Z98.890 OTHER SPECIFIED POSTPROCEDURAL STATES: Chronic | ICD-10-CM

## 2025-04-07 DIAGNOSIS — Z95.2 PRESENCE OF PROSTHETIC HEART VALVE: Chronic | ICD-10-CM

## 2025-04-07 DIAGNOSIS — Z95.2 PRESENCE OF PROSTHETIC HEART VALVE: ICD-10-CM

## 2025-04-07 DIAGNOSIS — Z79.01 LONG TERM (CURRENT) USE OF ANTICOAGULANTS: ICD-10-CM

## 2025-04-07 LAB
INR PPP: 1.9 RATIO
POCT-PROTHROMBIN TIME: 23 SECS
QUALITY CONTROL: YES

## 2025-04-07 PROCEDURE — 85610 PROTHROMBIN TIME: CPT

## 2025-04-07 PROCEDURE — 36416 COLLJ CAPILLARY BLOOD SPEC: CPT

## 2025-04-07 PROCEDURE — 99211 OFF/OP EST MAY X REQ PHY/QHP: CPT

## 2025-04-08 DIAGNOSIS — Z79.01 LONG TERM (CURRENT) USE OF ANTICOAGULANTS: ICD-10-CM

## 2025-04-11 ENCOUNTER — OUTPATIENT (OUTPATIENT)
Dept: OUTPATIENT SERVICES | Facility: HOSPITAL | Age: 50
LOS: 1 days | End: 2025-04-11
Payer: COMMERCIAL

## 2025-04-11 ENCOUNTER — APPOINTMENT (OUTPATIENT)
Dept: MEDICATION MANAGEMENT | Facility: CLINIC | Age: 50
End: 2025-04-11

## 2025-04-11 DIAGNOSIS — Z79.01 LONG TERM (CURRENT) USE OF ANTICOAGULANTS: ICD-10-CM

## 2025-04-11 DIAGNOSIS — Z98.890 OTHER SPECIFIED POSTPROCEDURAL STATES: Chronic | ICD-10-CM

## 2025-04-11 DIAGNOSIS — Z95.2 PRESENCE OF PROSTHETIC HEART VALVE: ICD-10-CM

## 2025-04-11 LAB
INR PPP: 2.9 RATIO
POCT-PROTHROMBIN TIME: 34 SECS
QUALITY CONTROL: YES

## 2025-04-11 PROCEDURE — 36416 COLLJ CAPILLARY BLOOD SPEC: CPT

## 2025-04-11 PROCEDURE — 99211 OFF/OP EST MAY X REQ PHY/QHP: CPT

## 2025-04-11 PROCEDURE — 85610 PROTHROMBIN TIME: CPT

## 2025-04-12 DIAGNOSIS — Z95.2 PRESENCE OF PROSTHETIC HEART VALVE: ICD-10-CM

## 2025-04-12 DIAGNOSIS — Z79.01 LONG TERM (CURRENT) USE OF ANTICOAGULANTS: ICD-10-CM

## 2025-04-22 ENCOUNTER — APPOINTMENT (OUTPATIENT)
Dept: MEDICATION MANAGEMENT | Facility: CLINIC | Age: 50
End: 2025-04-22

## 2025-04-22 ENCOUNTER — OUTPATIENT (OUTPATIENT)
Dept: OUTPATIENT SERVICES | Facility: HOSPITAL | Age: 50
LOS: 1 days | End: 2025-04-22
Payer: COMMERCIAL

## 2025-04-22 DIAGNOSIS — Z98.890 OTHER SPECIFIED POSTPROCEDURAL STATES: Chronic | ICD-10-CM

## 2025-04-22 DIAGNOSIS — Z95.2 PRESENCE OF PROSTHETIC HEART VALVE: ICD-10-CM

## 2025-04-22 DIAGNOSIS — Z79.01 LONG TERM (CURRENT) USE OF ANTICOAGULANTS: ICD-10-CM

## 2025-04-22 DIAGNOSIS — Z95.2 PRESENCE OF PROSTHETIC HEART VALVE: Chronic | ICD-10-CM

## 2025-04-22 PROCEDURE — 36416 COLLJ CAPILLARY BLOOD SPEC: CPT

## 2025-04-22 PROCEDURE — 99211 OFF/OP EST MAY X REQ PHY/QHP: CPT

## 2025-04-22 PROCEDURE — 85610 PROTHROMBIN TIME: CPT

## 2025-04-23 DIAGNOSIS — Z95.2 PRESENCE OF PROSTHETIC HEART VALVE: ICD-10-CM

## 2025-04-23 DIAGNOSIS — Z79.01 LONG TERM (CURRENT) USE OF ANTICOAGULANTS: ICD-10-CM

## 2025-04-25 ENCOUNTER — OUTPATIENT (OUTPATIENT)
Dept: OUTPATIENT SERVICES | Facility: HOSPITAL | Age: 50
LOS: 1 days | End: 2025-04-25
Payer: COMMERCIAL

## 2025-04-25 ENCOUNTER — APPOINTMENT (OUTPATIENT)
Dept: MEDICATION MANAGEMENT | Facility: CLINIC | Age: 50
End: 2025-04-25

## 2025-04-25 DIAGNOSIS — Z95.2 PRESENCE OF PROSTHETIC HEART VALVE: Chronic | ICD-10-CM

## 2025-04-25 DIAGNOSIS — Z98.890 OTHER SPECIFIED POSTPROCEDURAL STATES: Chronic | ICD-10-CM

## 2025-04-25 DIAGNOSIS — Z79.01 LONG TERM (CURRENT) USE OF ANTICOAGULANTS: ICD-10-CM

## 2025-04-25 DIAGNOSIS — Z95.2 PRESENCE OF PROSTHETIC HEART VALVE: ICD-10-CM

## 2025-04-25 LAB
INR PPP: 1.2 RATIO
POCT-PROTHROMBIN TIME: 14.4 SECS
QUALITY CONTROL: YES

## 2025-04-25 PROCEDURE — 36416 COLLJ CAPILLARY BLOOD SPEC: CPT

## 2025-04-25 PROCEDURE — 99211 OFF/OP EST MAY X REQ PHY/QHP: CPT

## 2025-04-25 PROCEDURE — 85610 PROTHROMBIN TIME: CPT

## 2025-04-26 DIAGNOSIS — Z79.01 LONG TERM (CURRENT) USE OF ANTICOAGULANTS: ICD-10-CM

## 2025-04-26 DIAGNOSIS — Z95.2 PRESENCE OF PROSTHETIC HEART VALVE: ICD-10-CM

## 2025-05-02 ENCOUNTER — APPOINTMENT (OUTPATIENT)
Dept: MEDICATION MANAGEMENT | Facility: CLINIC | Age: 50
End: 2025-05-02

## 2025-05-02 ENCOUNTER — OUTPATIENT (OUTPATIENT)
Dept: OUTPATIENT SERVICES | Facility: HOSPITAL | Age: 50
LOS: 1 days | End: 2025-05-02
Payer: COMMERCIAL

## 2025-05-02 DIAGNOSIS — Z95.2 PRESENCE OF PROSTHETIC HEART VALVE: ICD-10-CM

## 2025-05-02 DIAGNOSIS — Z98.890 OTHER SPECIFIED POSTPROCEDURAL STATES: Chronic | ICD-10-CM

## 2025-05-02 DIAGNOSIS — Z79.01 LONG TERM (CURRENT) USE OF ANTICOAGULANTS: ICD-10-CM

## 2025-05-02 DIAGNOSIS — Z95.2 PRESENCE OF PROSTHETIC HEART VALVE: Chronic | ICD-10-CM

## 2025-05-02 LAB
INR PPP: 1.4 RATIO
POCT-PROTHROMBIN TIME: 16.3 SECS
QUALITY CONTROL: YES

## 2025-05-02 PROCEDURE — 85610 PROTHROMBIN TIME: CPT

## 2025-05-02 PROCEDURE — 36416 COLLJ CAPILLARY BLOOD SPEC: CPT

## 2025-05-02 PROCEDURE — 99211 OFF/OP EST MAY X REQ PHY/QHP: CPT

## 2025-05-03 DIAGNOSIS — Z95.2 PRESENCE OF PROSTHETIC HEART VALVE: ICD-10-CM

## 2025-05-03 DIAGNOSIS — Z79.01 LONG TERM (CURRENT) USE OF ANTICOAGULANTS: ICD-10-CM

## 2025-05-06 ENCOUNTER — APPOINTMENT (OUTPATIENT)
Dept: MEDICATION MANAGEMENT | Facility: CLINIC | Age: 50
End: 2025-05-06

## 2025-05-06 ENCOUNTER — OUTPATIENT (OUTPATIENT)
Dept: OUTPATIENT SERVICES | Facility: HOSPITAL | Age: 50
LOS: 1 days | End: 2025-05-06
Payer: COMMERCIAL

## 2025-05-06 DIAGNOSIS — Z95.2 PRESENCE OF PROSTHETIC HEART VALVE: Chronic | ICD-10-CM

## 2025-05-06 DIAGNOSIS — Z98.890 OTHER SPECIFIED POSTPROCEDURAL STATES: Chronic | ICD-10-CM

## 2025-05-06 DIAGNOSIS — Z79.01 LONG TERM (CURRENT) USE OF ANTICOAGULANTS: ICD-10-CM

## 2025-05-06 LAB
INR PPP: 2.3 RATIO
POCT-PROTHROMBIN TIME: 27.8 SECS
QUALITY CONTROL: YES

## 2025-05-06 PROCEDURE — 85610 PROTHROMBIN TIME: CPT

## 2025-05-06 PROCEDURE — 36416 COLLJ CAPILLARY BLOOD SPEC: CPT

## 2025-05-06 PROCEDURE — 99211 OFF/OP EST MAY X REQ PHY/QHP: CPT

## 2025-05-07 DIAGNOSIS — Z79.01 LONG TERM (CURRENT) USE OF ANTICOAGULANTS: ICD-10-CM

## 2025-05-20 ENCOUNTER — OUTPATIENT (OUTPATIENT)
Dept: OUTPATIENT SERVICES | Facility: HOSPITAL | Age: 50
LOS: 1 days | End: 2025-05-20
Payer: COMMERCIAL

## 2025-05-20 ENCOUNTER — APPOINTMENT (OUTPATIENT)
Dept: MEDICATION MANAGEMENT | Facility: CLINIC | Age: 50
End: 2025-05-20

## 2025-05-20 DIAGNOSIS — Z95.2 PRESENCE OF PROSTHETIC HEART VALVE: Chronic | ICD-10-CM

## 2025-05-20 DIAGNOSIS — Z95.2 PRESENCE OF PROSTHETIC HEART VALVE: ICD-10-CM

## 2025-05-20 DIAGNOSIS — Z98.890 OTHER SPECIFIED POSTPROCEDURAL STATES: Chronic | ICD-10-CM

## 2025-05-20 DIAGNOSIS — Z79.01 LONG TERM (CURRENT) USE OF ANTICOAGULANTS: ICD-10-CM

## 2025-05-20 LAB
INR PPP: 4.3 RATIO
POCT-PROTHROMBIN TIME: 51.9 SECS
QUALITY CONTROL: YES

## 2025-05-20 PROCEDURE — 36416 COLLJ CAPILLARY BLOOD SPEC: CPT

## 2025-05-20 PROCEDURE — 99211 OFF/OP EST MAY X REQ PHY/QHP: CPT

## 2025-05-20 PROCEDURE — 85610 PROTHROMBIN TIME: CPT

## 2025-05-21 DIAGNOSIS — Z79.01 LONG TERM (CURRENT) USE OF ANTICOAGULANTS: ICD-10-CM

## 2025-06-13 ENCOUNTER — OUTPATIENT (OUTPATIENT)
Dept: OUTPATIENT SERVICES | Facility: HOSPITAL | Age: 50
LOS: 1 days | End: 2025-06-13
Payer: COMMERCIAL

## 2025-06-13 ENCOUNTER — APPOINTMENT (OUTPATIENT)
Dept: MEDICATION MANAGEMENT | Facility: CLINIC | Age: 50
End: 2025-06-13

## 2025-06-13 DIAGNOSIS — Z98.890 OTHER SPECIFIED POSTPROCEDURAL STATES: Chronic | ICD-10-CM

## 2025-06-13 DIAGNOSIS — Z79.01 LONG TERM (CURRENT) USE OF ANTICOAGULANTS: ICD-10-CM

## 2025-06-13 DIAGNOSIS — Z95.2 PRESENCE OF PROSTHETIC HEART VALVE: Chronic | ICD-10-CM

## 2025-06-13 PROCEDURE — 99211 OFF/OP EST MAY X REQ PHY/QHP: CPT

## 2025-06-13 PROCEDURE — 85610 PROTHROMBIN TIME: CPT

## 2025-06-13 PROCEDURE — 36416 COLLJ CAPILLARY BLOOD SPEC: CPT

## 2025-06-14 DIAGNOSIS — Z95.2 PRESENCE OF PROSTHETIC HEART VALVE: ICD-10-CM

## 2025-06-14 DIAGNOSIS — Z79.01 LONG TERM (CURRENT) USE OF ANTICOAGULANTS: ICD-10-CM

## 2025-06-17 ENCOUNTER — APPOINTMENT (OUTPATIENT)
Dept: CARDIOLOGY | Facility: CLINIC | Age: 50
End: 2025-06-17
Payer: COMMERCIAL

## 2025-06-17 ENCOUNTER — NON-APPOINTMENT (OUTPATIENT)
Age: 50
End: 2025-06-17

## 2025-06-17 PROCEDURE — 93296 REM INTERROG EVL PM/IDS: CPT

## 2025-06-17 PROCEDURE — 93295 DEV INTERROG REMOTE 1/2/MLT: CPT

## 2025-07-08 ENCOUNTER — OUTPATIENT (OUTPATIENT)
Dept: OUTPATIENT SERVICES | Facility: HOSPITAL | Age: 50
LOS: 1 days | End: 2025-07-08
Payer: COMMERCIAL

## 2025-07-08 ENCOUNTER — APPOINTMENT (OUTPATIENT)
Dept: MEDICATION MANAGEMENT | Facility: CLINIC | Age: 50
End: 2025-07-08

## 2025-07-08 DIAGNOSIS — Z95.2 PRESENCE OF PROSTHETIC HEART VALVE: ICD-10-CM

## 2025-07-08 DIAGNOSIS — Z98.890 OTHER SPECIFIED POSTPROCEDURAL STATES: Chronic | ICD-10-CM

## 2025-07-08 DIAGNOSIS — Z95.2 PRESENCE OF PROSTHETIC HEART VALVE: Chronic | ICD-10-CM

## 2025-07-08 DIAGNOSIS — Z79.01 LONG TERM (CURRENT) USE OF ANTICOAGULANTS: ICD-10-CM

## 2025-07-08 LAB
INR PPP: 2.5 RATIO
POCT-PROTHROMBIN TIME: 30.5 SECS
QUALITY CONTROL: YES

## 2025-07-08 PROCEDURE — 36416 COLLJ CAPILLARY BLOOD SPEC: CPT

## 2025-07-08 PROCEDURE — 85610 PROTHROMBIN TIME: CPT

## 2025-07-08 PROCEDURE — 99211 OFF/OP EST MAY X REQ PHY/QHP: CPT

## 2025-07-09 DIAGNOSIS — Z95.2 PRESENCE OF PROSTHETIC HEART VALVE: ICD-10-CM

## 2025-07-09 DIAGNOSIS — Z79.01 LONG TERM (CURRENT) USE OF ANTICOAGULANTS: ICD-10-CM

## 2025-07-29 ENCOUNTER — APPOINTMENT (OUTPATIENT)
Dept: MEDICATION MANAGEMENT | Facility: CLINIC | Age: 50
End: 2025-07-29

## 2025-07-29 ENCOUNTER — OUTPATIENT (OUTPATIENT)
Dept: OUTPATIENT SERVICES | Facility: HOSPITAL | Age: 50
LOS: 1 days | End: 2025-07-29
Payer: OTHER MISCELLANEOUS

## 2025-07-29 DIAGNOSIS — Z98.890 OTHER SPECIFIED POSTPROCEDURAL STATES: Chronic | ICD-10-CM

## 2025-07-29 DIAGNOSIS — Z95.2 PRESENCE OF PROSTHETIC HEART VALVE: ICD-10-CM

## 2025-07-29 DIAGNOSIS — Z79.01 LONG TERM (CURRENT) USE OF ANTICOAGULANTS: ICD-10-CM

## 2025-07-29 DIAGNOSIS — Z95.2 PRESENCE OF PROSTHETIC HEART VALVE: Chronic | ICD-10-CM

## 2025-07-29 LAB
INR PPP: 2.9 RATIO
POCT-PROTHROMBIN TIME: 35.2 SECS
QUALITY CONTROL: YES

## 2025-07-29 PROCEDURE — 36416 COLLJ CAPILLARY BLOOD SPEC: CPT

## 2025-07-29 PROCEDURE — 85610 PROTHROMBIN TIME: CPT

## 2025-07-29 PROCEDURE — 99211 OFF/OP EST MAY X REQ PHY/QHP: CPT

## 2025-07-30 DIAGNOSIS — Z95.2 PRESENCE OF PROSTHETIC HEART VALVE: ICD-10-CM

## 2025-07-30 DIAGNOSIS — Z79.01 LONG TERM (CURRENT) USE OF ANTICOAGULANTS: ICD-10-CM

## 2025-08-26 ENCOUNTER — OUTPATIENT (OUTPATIENT)
Dept: OUTPATIENT SERVICES | Facility: HOSPITAL | Age: 50
LOS: 1 days | End: 2025-08-26
Payer: OTHER MISCELLANEOUS

## 2025-08-26 ENCOUNTER — APPOINTMENT (OUTPATIENT)
Dept: MEDICATION MANAGEMENT | Facility: CLINIC | Age: 50
End: 2025-08-26

## 2025-08-26 DIAGNOSIS — M51.26 OTHER INTERVERTEBRAL DISC DISPLACEMENT, LUMBAR REGION: ICD-10-CM

## 2025-08-26 DIAGNOSIS — Z98.890 OTHER SPECIFIED POSTPROCEDURAL STATES: Chronic | ICD-10-CM

## 2025-08-26 DIAGNOSIS — Z95.2 PRESENCE OF PROSTHETIC HEART VALVE: ICD-10-CM

## 2025-08-26 DIAGNOSIS — M54.16 RADICULOPATHY, LUMBAR REGION: ICD-10-CM

## 2025-08-26 DIAGNOSIS — Z79.01 LONG TERM (CURRENT) USE OF ANTICOAGULANTS: ICD-10-CM

## 2025-08-26 DIAGNOSIS — Z95.2 PRESENCE OF PROSTHETIC HEART VALVE: Chronic | ICD-10-CM

## 2025-08-26 LAB
INR PPP: 3.2 RATIO
POCT-PROTHROMBIN TIME: 38.2 SECS
QUALITY CONTROL: YES

## 2025-08-26 PROCEDURE — 72158 MRI LUMBAR SPINE W/O & W/DYE: CPT

## 2025-08-26 PROCEDURE — 36416 COLLJ CAPILLARY BLOOD SPEC: CPT

## 2025-08-26 PROCEDURE — 72158 MRI LUMBAR SPINE W/O & W/DYE: CPT | Mod: 26

## 2025-08-26 PROCEDURE — 85610 PROTHROMBIN TIME: CPT

## 2025-08-26 PROCEDURE — A9579: CPT

## 2025-08-26 PROCEDURE — 99212 OFFICE O/P EST SF 10 MIN: CPT

## 2025-08-27 DIAGNOSIS — M51.26 OTHER INTERVERTEBRAL DISC DISPLACEMENT, LUMBAR REGION: ICD-10-CM

## 2025-08-27 DIAGNOSIS — Z79.01 LONG TERM (CURRENT) USE OF ANTICOAGULANTS: ICD-10-CM

## 2025-08-27 DIAGNOSIS — M54.16 RADICULOPATHY, LUMBAR REGION: ICD-10-CM

## 2025-08-27 DIAGNOSIS — Z95.2 PRESENCE OF PROSTHETIC HEART VALVE: ICD-10-CM

## 2025-09-16 ENCOUNTER — NON-APPOINTMENT (OUTPATIENT)
Age: 50
End: 2025-09-16

## 2025-09-16 ENCOUNTER — APPOINTMENT (OUTPATIENT)
Dept: CARDIOLOGY | Facility: CLINIC | Age: 50
End: 2025-09-16
Payer: SELF-PAY

## 2025-09-16 PROCEDURE — 93296 REM INTERROG EVL PM/IDS: CPT

## 2025-09-16 PROCEDURE — 93295 DEV INTERROG REMOTE 1/2/MLT: CPT
